# Patient Record
Sex: FEMALE | Race: WHITE | NOT HISPANIC OR LATINO | Employment: UNEMPLOYED | URBAN - METROPOLITAN AREA
[De-identification: names, ages, dates, MRNs, and addresses within clinical notes are randomized per-mention and may not be internally consistent; named-entity substitution may affect disease eponyms.]

---

## 2018-08-27 ENCOUNTER — HOSPITAL ENCOUNTER (EMERGENCY)
Facility: CLINIC | Age: 18
Discharge: HOME | End: 2018-08-28
Attending: EMERGENCY MEDICINE | Admitting: EMERGENCY MEDICINE
Payer: MEDICAID

## 2018-08-27 DIAGNOSIS — R11.2 NON-INTRACTABLE VOMITING WITH NAUSEA, UNSPECIFIED VOMITING TYPE: ICD-10-CM

## 2018-08-27 DIAGNOSIS — R53.81 MALAISE: ICD-10-CM

## 2018-08-27 DIAGNOSIS — J02.9 ACUTE PHARYNGITIS, UNSPECIFIED ETIOLOGY: ICD-10-CM

## 2018-08-27 DIAGNOSIS — R51.9 NONINTRACTABLE EPISODIC HEADACHE, UNSPECIFIED HEADACHE TYPE: ICD-10-CM

## 2018-08-27 PROCEDURE — 96375 TX/PRO/DX INJ NEW DRUG ADDON: CPT

## 2018-08-27 PROCEDURE — 96361 HYDRATE IV INFUSION ADD-ON: CPT

## 2018-08-27 PROCEDURE — 80053 COMPREHEN METABOLIC PANEL: CPT | Performed by: EMERGENCY MEDICINE

## 2018-08-27 PROCEDURE — 25000128 H RX IP 250 OP 636: Performed by: EMERGENCY MEDICINE

## 2018-08-27 PROCEDURE — 99284 EMERGENCY DEPT VISIT MOD MDM: CPT | Mod: 25

## 2018-08-27 PROCEDURE — 96374 THER/PROPH/DIAG INJ IV PUSH: CPT

## 2018-08-27 PROCEDURE — 86308 HETEROPHILE ANTIBODY SCREEN: CPT | Performed by: EMERGENCY MEDICINE

## 2018-08-27 PROCEDURE — 85025 COMPLETE CBC W/AUTO DIFF WBC: CPT | Performed by: EMERGENCY MEDICINE

## 2018-08-27 RX ORDER — DEXAMETHASONE SODIUM PHOSPHATE 10 MG/ML
10 INJECTION, SOLUTION INTRAMUSCULAR; INTRAVENOUS ONCE
Status: COMPLETED | OUTPATIENT
Start: 2018-08-27 | End: 2018-08-27

## 2018-08-27 RX ORDER — METOCLOPRAMIDE HYDROCHLORIDE 5 MG/ML
10 INJECTION INTRAMUSCULAR; INTRAVENOUS ONCE
Status: COMPLETED | OUTPATIENT
Start: 2018-08-27 | End: 2018-08-27

## 2018-08-27 RX ORDER — DIPHENHYDRAMINE HYDROCHLORIDE 50 MG/ML
25 INJECTION INTRAMUSCULAR; INTRAVENOUS ONCE
Status: COMPLETED | OUTPATIENT
Start: 2018-08-27 | End: 2018-08-27

## 2018-08-27 RX ADMIN — METOCLOPRAMIDE 10 MG: 5 INJECTION, SOLUTION INTRAMUSCULAR; INTRAVENOUS at 23:55

## 2018-08-27 RX ADMIN — DEXAMETHASONE SODIUM PHOSPHATE 10 MG: 10 INJECTION, SOLUTION INTRAMUSCULAR; INTRAVENOUS at 23:55

## 2018-08-27 RX ADMIN — DIPHENHYDRAMINE HYDROCHLORIDE 25 MG: 50 INJECTION, SOLUTION INTRAMUSCULAR; INTRAVENOUS at 23:55

## 2018-08-27 RX ADMIN — SODIUM CHLORIDE 1000 ML: 9 INJECTION, SOLUTION INTRAVENOUS at 23:55

## 2018-08-27 ASSESSMENT — ENCOUNTER SYMPTOMS
SORE THROAT: 1
FEVER: 1
HEADACHES: 1
NAUSEA: 1
DIFFICULTY URINATING: 0
VOMITING: 1
TROUBLE SWALLOWING: 1
DYSURIA: 0
SHORTNESS OF BREATH: 1
FREQUENCY: 0
APPETITE CHANGE: 1
WEAKNESS: 1
COUGH: 1
HEMATURIA: 0
MYALGIAS: 1
RHINORRHEA: 0
CHILLS: 1

## 2018-08-27 NOTE — ED AVS SNAPSHOT
Emergency Department    [address] [place] [address]    Phone:  [phone]    Fax:  [phone]                                       Sheri Fuller   MRN: [id number]    Department:   Emergency Department   Date of Visit:  8/27/2018           After Visit Summary Signature Page     I have received my discharge instructions, and my questions have been answered. I have discussed any challenges I see with this plan with the nurse or doctor.    ..........................................................................................................................................  Patient/Patient Representative Signature      ..........................................................................................................................................  Patient Representative Print Name and Relationship to Patient    ..................................................               ................................................  Date                                            Time    ..........................................................................................................................................  Reviewed by Signature/Title    ...................................................              ..............................................  Date                                                            Time          22EPIC Rev 08/18

## 2018-08-27 NOTE — ED AVS SNAPSHOT
Emergency Department    [address] [facility] [address]    Phone:  [phone]    Fax:  [phone]                                       Sheri Fuller   MRN: [id number]    Department:   Emergency Department   Date of Visit:  8/27/2018           Patient Information     Date Of Birth          2000        Your diagnoses for this visit were:     Acute pharyngitis, unspecified etiology     Malaise     Nonintractable episodic headache, unspecified headache type     Non-intractable vomiting with nausea, unspecified vomiting type        You were seen by Sony Mora MD and Elder Guzman MD.      Follow-up Information     Follow up with Vladislav Kim MD. Schedule an appointment as soon as possible for a visit in 2 days.    Specialty:  Family Practice    Why:  As needed    Contact information:    [facility]  [address]  [facility] [address] [phone]          Follow up with  Emergency Department.    Specialty:  EMERGENCY MEDICINE    Why:  If symptoms worsen    Contact information:    [address] [facility] [address] [phone]      Discharge References/Attachments     SORE THROAT, WHEN YOU HAVE A (ENGLISH)    PHARYNGITIS, REPORT PENDING (ENGLISH)    VOMITING (6Y-ADULT) (ENGLISH)    HEADACHE, UNSPECIFIED (ENGLISH)      24 Hour Appointment Hotline       To make an appointment at any [facility], call [phone] ([phone]). If you don't have a family doctor or clinic, we will help you find one. [facility] are conveniently located to serve the needs of you and your family.             Review of your medicines      START taking        Dose / Directions Last dose taken    ibuprofen 600 MG tablet   Commonly known as:  ADVIL/MOTRIN   Dose:  600 mg   Quantity:  30 tablet        Take 1 tablet (600 mg) by mouth every 6 hours as needed for moderate pain   Refills:  0        ondansetron 4 MG ODT tab   Commonly known as:  ZOFRAN ODT    Dose:  4 mg   Quantity:  10 tablet        Take 1 tablet (4 mg) by mouth every 8 hours as needed for nausea   Refills:  0                Prescriptions were sent or printed at these locations (2 Prescriptions)                   Other Prescriptions                Printed at Department/Unit printer (2 of 2)         ibuprofen (ADVIL/MOTRIN) 600 MG tablet               ondansetron (ZOFRAN ODT) 4 MG ODT tab                Procedures and tests performed during your visit     CBC with platelets + differential    Comprehensive metabolic panel    Mononucleosis screen      Orders Needing Specimen Collection     None      Pending Results     No orders found for last 3 day(s).            Pending Culture Results     No orders found for last 3 day(s).            Pending Results Instructions     If you had any lab results that were not finalized at the time of your Discharge, you can call the ED Lab Result RN at [phone]. You will be contacted by this team for any positive Lab results or changes in treatment. The nurses are available 7 days a week from 10A to 6:30P.  You can leave a message 24 hours per day and they will return your call.        Test Results From Your Hospital Stay        8/28/2018 12:32 AM      Component Results     Component Value Ref Range & Units Status    Sodium 137 133 - 144 mmol/L Final    Potassium 3.7 3.4 - 5.3 mmol/L Final    Chloride 103 96 - 110 mmol/L Final    Carbon Dioxide 24 20 - 32 mmol/L Final    Anion Gap 10 3 - 14 mmol/L Final    Glucose 85 70 - 99 mg/dL Final    Urea Nitrogen 9 7 - 30 mg/dL Final    Creatinine 0.77 0.50 - 1.00 mg/dL Final    GFR Estimate >90 >60 mL/min/1.7m2 Final    Non  GFR Calc    GFR Estimate If Black >90 >60 mL/min/1.7m2 Final    African American GFR Calc    Calcium 8.5 8.5 - 10.1 mg/dL Final    Bilirubin Total 0.3 0.2 - 1.3 mg/dL Final    Albumin 3.9 3.4 - 5.0 g/dL Final    Protein Total 8.2 6.8 - 8.8 g/dL Final    Alkaline Phosphatase 72 40 - 150 U/L  Final    ALT 17 0 - 50 U/L Final    AST 17 0 - 35 U/L Final         8/28/2018 12:14 AM      Component Results     Component Value Ref Range & Units Status    Mononucleosis Screen Negative NEG^Negative Final         8/28/2018 12:07 AM      Component Results     Component Value Ref Range & Units Status    WBC 10.2 4.0 - 11.0 10e9/L Final    RBC Count 4.11 3.8 - 5.2 10e12/L Final    Hemoglobin 12.4 11.7 - 15.7 g/dL Final    Hematocrit 36.7 35.0 - 47.0 % Final    MCV 89 78 - 100 fl Final    MCH 30.2 26.5 - 33.0 pg Final    MCHC 33.8 31.5 - 36.5 g/dL Final    RDW 12.5 10.0 - 15.0 % Final    Platelet Count 236 150 - 450 10e9/L Final    Diff Method Automated Method  Final    % Neutrophils 75.3 % Final    % Lymphocytes 14.1 % Final    % Monocytes 9.3 % Final    % Eosinophils 0.9 % Final    % Basophils 0.2 % Final    % Immature Granulocytes 0.2 % Final    Nucleated RBCs 0 0 /100 Final    Absolute Neutrophil 7.7 1.6 - 8.3 10e9/L Final    Absolute Lymphocytes 1.4 0.8 - 5.3 10e9/L Final    Absolute Monocytes 1.0 0.0 - 1.3 10e9/L Final    Absolute Eosinophils 0.1 0.0 - 0.7 10e9/L Final    Absolute Basophils 0.0 0.0 - 0.2 10e9/L Final    Abs Immature Granulocytes 0.0 0 - 0.4 10e9/L Final    Absolute Nucleated RBC 0.0  Final                Clinical Quality Measure: Blood Pressure Screening     Your blood pressure was checked while you were in the emergency department today. The last reading we obtained was  BP: 115/68 . Please read the guidelines below about what these numbers mean and what you should do about them.  If your systolic blood pressure (the top number) is less than 120 and your diastolic blood pressure (the bottom number) is less than 80, then your blood pressure is normal. There is nothing more that you need to do about it.  If your systolic blood pressure (the top number) is 120-139 or your diastolic blood pressure (the bottom number) is 80-89, your blood pressure may be higher than it should be. You should have  "your blood pressure rechecked within a year by a primary care provider.  If your systolic blood pressure (the top number) is 140 or greater or your diastolic blood pressure (the bottom number) is 90 or greater, you may have high blood pressure. High blood pressure is treatable, but if left untreated over time it can put you at risk for heart attack, stroke, or kidney failure. You should have your blood pressure rechecked by a primary care provider within the next 4 weeks.  If your provider in the emergency department today gave you specific instructions to follow-up with your doctor or provider even sooner than that, you should follow that instruction and not wait for up to 4 weeks for your follow-up visit.        Thank you for choosing [facility]       Thank you for choosing [facility] for your care. Our goal is always to provide you with excellent care. Hearing back from our patients is one way we can continue to improve our services. Please take a few minutes to complete the written survey that you may receive in the mail after you visit with us. Thank you!        ANTERIOS Information     [facility]IOS lets you send messages to your doctor, view your test results, renew your prescriptions, schedule appointments and more. To sign up, go to [email or web address] . Click on \"Log in\" on the left side of the screen, which will take you to the Welcome page. Then click on \"Sign up Now\" on the right side of the page.     You will be asked to enter the access code listed below, as well as some personal information. Please follow the directions to create your username and password.     Your access code is: [email or web address]  Expires: 2018  1:50 AM     Your access code will  in 90 days. If you need help or a new code, please call your [facility] clinic or [phone].        Care EveryWhere ID     This is your Care EveryWhere ID. This could be used by other organizations to access your [facility] medical records  [id number]   "      Equal Access to Services     RADHA LUND : Yaron [facility], heather grossman, chirag dahl. So [facility] [phone].    ATENCIÓN: Si habla español, tiene a lu disposición servicios gratuitos de asistencia lingüística. Llame al [phone].    We comply with applicable federal civil rights laws and [place] laws. We do not discriminate on the basis of race, color, national origin, age, disability, sex, sexual orientation, or gender identity.            After Visit Summary       This is your record. Keep this with you and show to your community pharmacist(s) and doctor(s) at your next visit.

## 2018-08-28 VITALS
BODY MASS INDEX: 20.89 KG/M2 | RESPIRATION RATE: 18 BRPM | HEIGHT: 66 IN | OXYGEN SATURATION: 99 % | TEMPERATURE: 98.1 F | WEIGHT: 130 LBS | DIASTOLIC BLOOD PRESSURE: 75 MMHG | SYSTOLIC BLOOD PRESSURE: 114 MMHG

## 2018-08-28 LAB
ALBUMIN SERPL-MCNC: 3.9 G/DL (ref 3.4–5)
ALP SERPL-CCNC: 72 U/L (ref 40–150)
ALT SERPL W P-5'-P-CCNC: 17 U/L (ref 0–50)
ANION GAP SERPL CALCULATED.3IONS-SCNC: 10 MMOL/L (ref 3–14)
AST SERPL W P-5'-P-CCNC: 17 U/L (ref 0–35)
BASOPHILS # BLD AUTO: 0 10E9/L (ref 0–0.2)
BASOPHILS NFR BLD AUTO: 0.2 %
BILIRUB SERPL-MCNC: 0.3 MG/DL (ref 0.2–1.3)
BUN SERPL-MCNC: 9 MG/DL (ref 7–30)
CALCIUM SERPL-MCNC: 8.5 MG/DL (ref 8.5–10.1)
CHLORIDE SERPL-SCNC: 103 MMOL/L (ref 96–110)
CO2 SERPL-SCNC: 24 MMOL/L (ref 20–32)
CREAT SERPL-MCNC: 0.77 MG/DL (ref 0.5–1)
DIFFERENTIAL METHOD BLD: NORMAL
EOSINOPHIL # BLD AUTO: 0.1 10E9/L (ref 0–0.7)
EOSINOPHIL NFR BLD AUTO: 0.9 %
ERYTHROCYTE [DISTWIDTH] IN BLOOD BY AUTOMATED COUNT: 12.5 % (ref 10–15)
GFR SERPL CREATININE-BSD FRML MDRD: >90 ML/MIN/1.7M2
GLUCOSE SERPL-MCNC: 85 MG/DL (ref 70–99)
HCT VFR BLD AUTO: 36.7 % (ref 35–47)
HETEROPH AB SER QL: NEGATIVE
HGB BLD-MCNC: 12.4 G/DL (ref 11.7–15.7)
IMM GRANULOCYTES # BLD: 0 10E9/L (ref 0–0.4)
IMM GRANULOCYTES NFR BLD: 0.2 %
LYMPHOCYTES # BLD AUTO: 1.4 10E9/L (ref 0.8–5.3)
LYMPHOCYTES NFR BLD AUTO: 14.1 %
MCH RBC QN AUTO: 30.2 PG (ref 26.5–33)
MCHC RBC AUTO-ENTMCNC: 33.8 G/DL (ref 31.5–36.5)
MCV RBC AUTO: 89 FL (ref 78–100)
MONOCYTES # BLD AUTO: 1 10E9/L (ref 0–1.3)
MONOCYTES NFR BLD AUTO: 9.3 %
NEUTROPHILS # BLD AUTO: 7.7 10E9/L (ref 1.6–8.3)
NEUTROPHILS NFR BLD AUTO: 75.3 %
NRBC # BLD AUTO: 0 10*3/UL
NRBC BLD AUTO-RTO: 0 /100
PLATELET # BLD AUTO: 236 10E9/L (ref 150–450)
POTASSIUM SERPL-SCNC: 3.7 MMOL/L (ref 3.4–5.3)
PROT SERPL-MCNC: 8.2 G/DL (ref 6.8–8.8)
RBC # BLD AUTO: 4.11 10E12/L (ref 3.8–5.2)
SODIUM SERPL-SCNC: 137 MMOL/L (ref 133–144)
WBC # BLD AUTO: 10.2 10E9/L (ref 4–11)

## 2018-08-28 PROCEDURE — 25000128 H RX IP 250 OP 636: Performed by: EMERGENCY MEDICINE

## 2018-08-28 RX ORDER — KETOROLAC TROMETHAMINE 15 MG/ML
15 INJECTION, SOLUTION INTRAMUSCULAR; INTRAVENOUS ONCE
Status: COMPLETED | OUTPATIENT
Start: 2018-08-28 | End: 2018-08-28

## 2018-08-28 RX ORDER — IBUPROFEN 600 MG/1
600 TABLET, FILM COATED ORAL EVERY 6 HOURS PRN
Qty: 30 TABLET | Refills: 0 | Status: SHIPPED | OUTPATIENT
Start: 2018-08-28 | End: 2018-12-26

## 2018-08-28 RX ORDER — ONDANSETRON 4 MG/1
4 TABLET, ORALLY DISINTEGRATING ORAL EVERY 8 HOURS PRN
Qty: 10 TABLET | Refills: 0 | Status: SHIPPED | OUTPATIENT
Start: 2018-08-28 | End: 2019-02-03

## 2018-08-28 RX ADMIN — KETOROLAC TROMETHAMINE 15 MG: 15 INJECTION, SOLUTION INTRAMUSCULAR; INTRAVENOUS at 00:55

## 2018-08-28 NOTE — ED PROVIDER NOTES
"  History     Chief Complaint:  Cough    HPI   Sheri Fuller is an otherwise healthy 17 year old female who presents with cough. Friend reports patient has been experiencing non-productive cough, sore throat, shortness of breath, difficulty swallowing secondary to throat pain, decreased appetite, generalized weakness, myalgias, headache, nausea, and vomiting for the last 3 days. Symptoms became worse today and she was evaluated at [facility] in [place] earlier today and had a rapid strep which was stated to be \"inconclusive.\" She reports a culture is pending. Patient then presents to the emergency department for further evaluation and treatment. Upon evaluation, patient endorses subjective fever and chills. She has taken Tylenol, ibuprofen and Excedrin for her symptoms without much relief. Last dose of ibuprofen was about 2-3 hours ago. Family member also notes that patient had a panic attack earlier today. Patient also reports having an abnormality or \"bump\" on her right nipple for 3 months with increase in pain, redness, and swelling. Denies rhinorrhea, urinary symptoms, abnormal vaginal bleeding or discharge. No tobacco, alcohol, or illicit drug use.     Allergies:  No known drug allergies.    Medications:    The patient is currently on no regular medications.    Past Medical History:    History reviewed.  No significant past medical history.     Past Surgical History:    History reviewed. No pertinent past surgical history.    Family History:    History reviewed. No pertinent family history.    Social History:  Tobacco Use: No  Alcohol Use: No  Accompanied to the ED by friend     Review of Systems   Constitutional: Positive for appetite change, chills and fever.   HENT: Positive for sore throat and trouble swallowing. Negative for rhinorrhea.    Respiratory: Positive for cough and shortness of breath.    Gastrointestinal: Positive for nausea and vomiting.   Genitourinary: Negative for difficulty " "urinating, dysuria, frequency, hematuria, vaginal bleeding and vaginal discharge.   Musculoskeletal: Positive for myalgias.   Neurological: Positive for weakness (generalized) and headaches.   All other systems reviewed and are negative.    Physical Exam   Patient Vitals for the past 24 hrs:   BP Temp Temp src Heart Rate Resp SpO2 Height Weight   08/28/18 0159 114/75 - - 89 18 99 % - -   08/27/18 2312 115/68 98.1  F (36.7  C) Temporal 88 16 100 % 1.676 m (5' 6\") 59 kg (130 lb)      Physical Exam     Constitutional:  Appears well-developed and well-nourished. Cooperative. Appears uncomfortable.  HENT:   Head:    Atraumatic.   Ears:   Cerumen in both ears. Visualized portions of TMs look unremarkable.   Mouth/Throat:   Tacky oral mucosa. Erythema and Mild symmetric swelling to posterior oropharynx. No exudate. Anterior lymphadenopathy bilaterally.   Eyes:    Conjunctivae normal and EOM are normal.      Pupils are equal, round, and reactive to light.   Neck:    Normal range of motion. Neck supple.   Cardiovascular:  Normal rate, regular rhythm, normal heart sounds and radial and    dorsalis pedis pulses are 2+ and symmetric.    Pulmonary/Chest:  Effort normal and breath sounds normal.   Abdominal:   Soft. Bowel sounds are normal.      No splenomegaly or hepatomegaly. No tenderness. No rebound.   Breast:   5mm indurated lump on right areola at approximately 9 oclock. No surrounding erythema or fluctuance.  Musculoskeletal:  Normal range of motion. No edema and no tenderness.   Neurological:  Alert. Normal strength. No cranial nerve deficit.   Skin:    Skin is warm and dry.   Psychiatric:   Normal mood and affect.     Emergency Department Course   Laboratory:  Laboratory findings were communicated with the patient who voiced understanding of the findings.   CBC: WNL (WBC 10.2, HGB 12.4, )  CMP: (Creatinine 0.77)   Mononucleosis: Negative.    Interventions:  2355: Reglan 10 mg IV  2355: NS 1L IV Bolus   2355: " Decadron 10 mg IV  2355: Benadryl 25 mg IV    Emergency Department Course:  Past medical records, nursing notes, and vitals reviewed.  2330: I performed an exam of the patient and obtained history, as documented above.  IV inserted and blood drawn for basic laboratory. Results as noted above.    2355: Patient was given the above interventions while here in the emergency department.   0152: I rechecked the patient and explained the findings to the patient.  Patient discharged home with instructions regarding supportive care, medications, and reasons to return. The importance of close follow-up was reviewed.      Impression & Plan    Medical Decision Making:  Patient presents complaining of sore throat, general malaise, migraine headache, and a bump that's been on her nipple for the last few months. She's had a rapid strep test done at outside clinic which was negative. Culture is pending, so I did not repeat this. She reports she's been vomiting and feels weak and dehydrated. CBC with differential, comprehensive metabolic panel look unremarkable. Abdominal exam is benign. I checked a mono and it's negative. I discussed with the patient that this is still in the differential because she's only had symptoms for 3 days and repeat testing may be indicated for persistent symptoms. Currently there is not tenderness over her spleen or palpable abdominal mass.    Regarding the patient's headache. She has a history of recurrent migraines. She said this is similar to her usual migraines, only worse than normal. After IV fluids, Reglan, benadryl, and Toradol and a dose of IV decadron aimed at her throat inflammation, patient's headache has completely resolved. Her throat pain has greatly improved.    I don't see signs of peritonsillar abscess. There's no Eliseo's angina, retropharyngeal abscess, epiglottitis, or signs of Lemierre's, the patient is non-toxic appearing. I do not detect any serious complications from her likely  viral pharyngitis.     Patient has a small indurated lump/potts's tubercle, on her right areola. This does not appear to be infected. There's no fluctuance, nothing amendable to drainage. I've asked the patient to try hot compresses, and she can see her primary care provider if it does not normalize. This does not appear to be a breast mass and it is not concerning for neoplasm.     The patient felt improved with the abovementioned interventions. She feels ready to go home. I prescribed Reglan for headache and nausea at home. She'll continue taking Tylenol and/or ibuprofen for her other symptoms. Patient's discharged in good condition.     Diagnosis:    ICD-10-CM   1. Acute pharyngitis, unspecified etiology J02.9   2. Malaise R53.81   3. Nonintractable episodic headache, unspecified headache type R51   4. Non-intractable vomiting with nausea, unspecified vomiting type R11.2     Disposition:  discharged to home    Discharge Medications:   Details   ibuprofen (ADVIL/MOTRIN) 600 MG tablet Take 1 tablet (600 mg) by mouth every 6 hours as needed for moderate pain, Disp-30 tablet, R-0, Local Print      ondansetron (ZOFRAN ODT) 4 MG ODT tab Take 1 tablet (4 mg) by mouth every 8 hours as needed for nausea, Disp-10 tablet, R-0, Local Print     Shirley Elaine  8/27/18   EMERGENCY DEPARTMENT  IShirley Do, am serving as a scribe at 11:30 PM on 8/27/2018 to document services personally performed by Elder Guzman MD based on my observations and the provider's statements to me.       Elder Guzman MD  08/28/18 0639

## 2018-09-12 ENCOUNTER — OFFICE VISIT (OUTPATIENT)
Dept: FAMILY MEDICINE | Facility: CLINIC | Age: 18
End: 2018-09-12

## 2018-09-12 VITALS
WEIGHT: 131.2 LBS | DIASTOLIC BLOOD PRESSURE: 70 MMHG | HEART RATE: 100 BPM | SYSTOLIC BLOOD PRESSURE: 102 MMHG | TEMPERATURE: 98.2 F | BODY MASS INDEX: 21.18 KG/M2 | OXYGEN SATURATION: 98 %

## 2018-09-12 DIAGNOSIS — N92.6 IRREGULAR MENSTRUAL CYCLE: Chronic | ICD-10-CM

## 2018-09-12 LAB
HBA1C MFR BLD: 5.4 % (ref 0–5.6)
PROLACTIN SERPL-MCNC: 9 UG/L (ref 3–27)
T4 FREE SERPL-MCNC: 0.88 NG/DL (ref 0.76–1.46)
TSH SERPL DL<=0.005 MIU/L-ACNC: 1.16 MU/L (ref 0.4–4)

## 2018-09-12 RX ORDER — SPIRONOLACTONE 25 MG/1
50 TABLET ORAL DAILY
Qty: 30 TABLET | Refills: 3 | Status: SHIPPED | OUTPATIENT
Start: 2018-09-12 | End: 2018-10-18

## 2018-09-12 NOTE — PROGRESS NOTES
[facility]       Sheri Fuller is a 17 year old  who presents for   Chief Complaint   Patient presents with     Vaginal Bleeding     menstural cycle that comes every 14-21 days and lasts for 7 and bleeds severly for 3 days and then moderate for 4     Vaginal Bleeding /Dysmenorrhea  Onset: 5 years ago    Description:   Duration of bleeding episodes: +/- 7 days  Frequency between periods:  1 week  Describe bleeding/flow:   Clots: Yes Details: occasionally  Number of pads/hour: 2  Cramping: severe: 1st 2 days.    Accompanying Signs & Symptoms:  Weakness: Yes Details: un-motivated and ill.  Lightheadedness: no  Hot flashes: no  Nosebleeds/Easy bruising: no  Vaginal Discharge: no    History:  Patient's last menstrual period was 09/05/2018.  Previous normal periods: Yes Details: 1st year of menses: age 13-14  Contraceptive use: NONE: has assumed that her partner, Alana, a MTF trans person, is infertile despite her ability to achieve erections and ejaculations while on estrogen and spironolactone.   Possibility of Pregnancy: YES- remote: currently on day 7 of cycle.  Any bleeding after intercourse: no  Age of first period (menarche): 11  Abnormal PAP Smears: no  Any previous pelvic imaging? no    What makes it worse?:  Stress.    What makes it better?: heating pads, midol  Treatments and Outcome? Nothing has changed the bleeding pattern: she has tried 2 different OCPs and they both failed to regulate her menses.    Problem, Medication and Allergy Lists were reviewed and updated if needed.  Patient is a new patient to this clinic and so  I reviewed/updated the Past Medical History, the Family History and the Social History .         Review of Systems:   Review of Systems   Constitutional: Negative for activity change, appetite change, chills, diaphoresis, fever and unexpected weight change.   HENT: Negative for ear pain and sore throat.    Eyes: Negative for pain.   Respiratory: Negative for cough and shortness of breath.     Cardiovascular: Negative for chest pain and palpitations.   Gastrointestinal: Negative for abdominal pain, constipation, diarrhea, rectal pain and vomiting.   Endocrine: Negative.    Genitourinary: Positive for menstrual problem, vaginal bleeding (menometorrhagia.) and vaginal pain (cramps). Negative for difficulty urinating, dyspareunia, frequency, genital sores, hematuria and vaginal discharge.   Musculoskeletal: Positive for back pain. Negative for gait problem.   Skin: Positive for rash (severe cystic acne over entire face.).        Increased growth of body hair on thighs, groin, lower abdomen, lower back.   Allergic/Immunologic: Negative.    Neurological: Negative for seizures, syncope and headaches.   Hematological: Negative.  Does not bruise/bleed easily.   Psychiatric/Behavioral: Positive for dysphoric mood and sleep disturbance. Negative for self-injury. The patient is not nervous/anxious.    All other systems reviewed and are negative.         Physical Exam:     Vitals:    09/12/18 1340   BP: 102/70   Pulse: 100   Temp: 98.2  F (36.8  C)   TempSrc: Oral   SpO2: 98%   Weight: 131 lb 3.2 oz (59.5 kg)     Body mass index is 21.18 kg/(m^2).  Vitals were reviewed and were normal.     Physical Exam   Constitutional: She is oriented to person, place, and time. She appears well-developed and well-nourished. No distress.   HENT:   Head: Normocephalic and atraumatic.   Eyes: EOM are normal. Pupils are equal, round, and reactive to light.   Neck: Neck supple.   Cardiovascular: Normal rate, regular rhythm, normal heart sounds and intact distal pulses.    Pulmonary/Chest: Effort normal and breath sounds normal. No respiratory distress. She has no wheezes.   Abdominal: Soft. Bowel sounds are normal. She exhibits no distension. There is no tenderness.   Musculoskeletal: Normal range of motion. She exhibits no edema, tenderness or deformity.   Neurological: She is alert and oriented to person, place, and time. She  exhibits normal muscle tone.   Skin: Skin is warm and dry. She is not diaphoretic.   Psychiatric: She has a normal mood and affect. Thought content normal.   Vitals reviewed.      Results:    Results from this visit  Results for orders placed or performed in visit on 09/12/18   Prolactin   Result Value Ref Range    Prolactin 9 3 - 27 ug/L   Testosterone total   Result Value Ref Range    Testosterone Total 35 8 - 60 ng/dL   TSH   Result Value Ref Range    TSH 1.16 0.40 - 4.00 mU/L   T4 FREE   Result Value Ref Range    T4 Free 0.88 0.76 - 1.46 ng/dL   Hemoglobin A1c   Result Value Ref Range    Hemoglobin A1C 5.4 0 - 5.6 %     Assessment and Plan      Sheri was seen today for abnormal vaginal bleeding.  She reports that both menorrhagia and metorrhagia have been issues for her fairly consistently over the past 5+ years.  She reports that when her menses started (age 11) they were very light, normal, and consistent for the first year.  Since then her menstrual cycle has been a struggle.  She has tried both lo-Ortho Tri-Cyclen and another OCP without having either 1 control her menstrual cycle in the past.    She has a strong family history of menstrual system troubles: Her grandmother had fibroids that caused bleeding severe enough to warrant hysterectomy.  Her mother has been diagnosed with fibroids and endometriosis.  Her older sister (who is also not overweight) has struggled with PCOS her whole life to the degree that she underwent several cycles of IVF before being able to conceive.  In addition, her older sister has no known history of diabetes or insulin resistance that is of a testable/pathological level.    In addition, Sheri reports increased (male pattern) body hair that is becoming progressively thicker on her thighs, pubic region, lower abdomen, and lower back.  She also states that she has had severe cystic acne that covers cheeks, forehead, chin and also affects chest and upper back.  She has treated  this with many different over-the-counter products that have not made a significant impact.    While it is important to rule out other common issues such as thyroid abnormalities, and more uncommon issues such as adrenal gland irregularities, based on her symptoms and her family history (and despite her slender body habitus) this is very likely to be PCOS, fibroid, or endometriosis.    I was also concerned to note that Sheri and her partner (Alana MTF) reported that they were sexually active without protection, and that they were both under the impression that since Alana is on spironolactone and estrogen that she is infertile, while she is still able to achieve erection and ejaculation.  I explained that it would be very important to get testing to prove that, and that infertility cannot be assumed based on the breakthrough testosterone supporting penetrative acts/orgasm.    Both Lori's irregular menses and current sexual activity with a person with sperm potentially can be treated with a hormonal birth control: Despite past inability to control cycle, previous hormone administration had possible adherence issues as well as possibly not having had a long enough trial to work.  After a long conversation about the different options that are available to both prevent pregnancy and hopefully regulate her cycle, they determined that a Mirena IUD would be the least intrusive.  In addition, after discussing potential risks and benefits of medication, she desires a trial of spironolactone to gauge its effect on her body hair and cystic acne.    Diagnoses and all orders for this visit:    Irregular menstrual cycle  -     spironolactone (ALDACTONE) 25 MG tablet; Take 2 tablets (50 mg) by mouth daily  -     US Transvaginal Non OB; Future  -     Prolactin  -     Testosterone total  -     TSH  -     T4 FREE  -     Hemoglobin A1c  -     Colposcopy/Gynecology Clinic-[facility] INTERNAL REFERRAL; Future: Mirena Implant  planned.    We plan to follow-up 1 month after the Mirena placement to discuss how her cycle has responded (potential to supplement with OCPs for heavier than normal breakthrough bleeding).     There are no discontinued medications.    Options for treatment and follow-up care were reviewed with the patient. Sheri Fuller engaged in the decision making process and verbalized understanding of the options discussed and agreed with the final plan.    DO [facility]  [phone]  [facility]

## 2018-09-12 NOTE — PATIENT INSTRUCTIONS
Here is the plan from today's visit    1. Irregular menstrual cycle  - spironolactone (ALDACTONE) 25 MG tablet; Take 2 tablets (50 mg) by mouth daily  Dispense: 30 tablet; Refill: 3  - US Transvaginal Non OB; Future  - Prolactin  - Testosterone total  - TSH  - T4 FREE  - Hemoglobin A1c  - Colposcopy/Gynecology Clinic-[facility] INTERNAL REFERRAL; Future: Mirena Implant.      Please call or return to clinic if your symptoms don't go away.    Follow up plan  Please make a clinic appointment for follow up with me (ABBY HANSEN) in 3 weeks for follow up.    [facility]  Jazmine Hansen,    [facility]  [phone]  [facility] [place] [facility] [place]    Lab Testing:  **If you had lab testing today and your results are reassuring or normal they will be mailed to you or sent through [employer] within 7 days.   **If the lab tests need quick action we will call you with the results.  The phone number we will call with results is # [phone] (home) . If this is not the best number please call our clinic and change the number.  Medication Refills:  If you need any refills please call your pharmacy and they will contact us.   If you need to  your refill at a new pharmacy, please contact the new pharmacy directly. The new pharmacy will help you get your medications transferred faster.   Scheduling:  If you have any concerns about today's visit or wish to schedule another appointment please call our office during normal business hours [phone] (8-5:00 M-F)  If a referral was made to a [facility] and you don't get a call from central scheduling please call [phone].  If a Mammogram was ordered for you at [facility] call [phone] to schedule or change your appointment.  If you had an XRay/CT/Ultrasound/MRI ordered the number is [phone] to schedule or change your radiology appointment.   Medical Concerns:  If you have  urgent medical concerns please call [phone] at any time of the day.    Natasha Hansen DO

## 2018-09-12 NOTE — MR AVS SNAPSHOT
After Visit Summary   9/12/2018    Sheri Fuller    MRN: [id number]           Patient Information     Date Of Birth          2000        Visit Information        Provider Department      9/12/2018 1:40 PM Abby Hansen DO [facility]        Today's Diagnoses     Irregular menstrual cycle          Care Instructions    Here is the plan from today's visit    1. Irregular menstrual cycle  - spironolactone (ALDACTONE) 25 MG tablet; Take 2 tablets (50 mg) by mouth daily  Dispense: 30 tablet; Refill: 3  - US Transvaginal Non OB; Future  - Prolactin  - Testosterone total  - TSH  - T4 FREE  - Hemoglobin A1c  - Colposcopy/Gynecology Clinic-[facility] INTERNAL REFERRAL; Future: Mirena Implant.      Please call or return to clinic if your symptoms don't go away.    Follow up plan  Please make a clinic appointment for follow up with me (ABBY HANSEN) in 3 weeks for follow up.    Thank you for coming to [facility] today!  Jazmine Hansen DO   [facility]  [phone]  [facility]    Lab Testing:  **If you had lab testing today and your results are reassuring or normal they will be mailed to you or sent through [employer] within 7 days.   **If the lab tests need quick action we will call you with the results.  The phone number we will call with results is # [phone] (home) . If this is not the best number please call our clinic and change the number.  Medication Refills:  If you need any refills please call your pharmacy and they will contact us.   If you need to  your refill at a new pharmacy, please contact the new pharmacy directly. The new pharmacy will help you get your medications transferred faster.   Scheduling:  If you have any concerns about today's visit or wish to schedule another appointment please call our office during normal business hours [phone] (8-5:00 M-F)  If a referral was made to a [facility] and you don't get a call from central scheduling please call [phone].  If a Mammogram was ordered for you at [facility] call [phone] to schedule or change your appointment.  If you had an XRay/CT/Ultrasound/MRI ordered the number is [phone] to schedule or change your radiology appointment.   Medical Concerns:  If you have urgent medical concerns please call [phone] at any time of the day.    Natasha Hansen, DO            Follow-ups after your visit        Additional Services     [facility]copy[facility]Gynecology [facility] REFERR[facility]       What procedure: Mirena placement.  Urgency of Appointment: Next Available  Would this patient benefit from pre-medication with Ativan for procedural anxiety? Yes ([place] Provider will Order, Patient instructed to Arrive 1 hour early)  Is this patient post-menopausal? No                  Future tests that were ordered for you today     Open Future Orders        Priority Expected Expires Ordered    US Transvaginal Non OB Routine 9/26/2018 9/12/2019 9/12/2018    Colposcopy[facility]Gynecology Clinic-[facility] INTERNAL REFERRAL Routine 9/12/2018 10/1/2018 9/12/2018            Who to contact     Please call your clinic at [phone] to:    Ask questions about your health    Make or cancel appointments    Discuss your medicines    Learn about your test results    Speak to your doctor            Additional Information About Your Visit        MyChart Information     [employer] is an electronic gateway that provides easy, online access to your medical records. With [employer], you can request a clinic appointment, read your test results, renew a prescription or communicate with your care team.     To sign up for [employer] visit the website at [email or web address]   You will be asked to enter the access code listed below, as well as some personal information. Please follow the directions to create your username and password.     Your access code is:  [email or web address]  Expires: 2018  1:50 AM     Your access code will  in 90 days. If you need help or a new code, please contact your [facility] or call [phone] for assistance.        Care EveryWhere ID     This is your Care EveryWhere ID. This could be used by other organizations to access your [place] medical records  [id number]        Your Vitals Were     Pulse Temperature Last Period Pulse Oximetry BMI (Body Mass Index)       100 98.2  F (36.8  C) (Oral) 2018 98% 21.18 kg/m2        Blood Pressure from Last 3 Encounters:   18 102/70   18 114/75    Weight from Last 3 Encounters:   18 131 lb 3.2 oz (59.5 kg) (56 %)*   18 130 lb (59 kg) (54 %)*     * Growth percentiles are based on [facility] 2-20 Years data.              We Performed the Following     Hemoglobin A1c     Prolactin     T4 FREE     Testosterone total     TSH          Today's Medication Changes          These changes are accurate as of 18  3:18 PM.  If you have any questions, ask your nurse or doctor.               Start taking these medicines.        Dose/Directions    spironolactone 25 MG tablet   Commonly known as:  ALDACTONE   Used for:  Irregular menstrual cycle   Started by:  Natasha Hansen DO        Dose:  50 mg   Take 2 tablets (50 mg) by mouth daily   Quantity:  30 tablet   Refills:  3            Where to get your medicines      These medications were sent to [facility] [address] [place] [address] & [address], [place] [address]     Phone:  [phone]     spironolactone 25 MG tablet                Primary Care Provider Office Phone # Fax #    Natasha Hansen [phone]       [address] [address]        Equal Access to Services     RADHA LUND AH: Yaron Haywood, heather grossman, chirag dahl  ladonald domingo. So [facility] [phone].    ATENCIÓN: Si habla catarina, tiene a lu disposición servicios gratuitos de asistencia lingüística. Rosana al [phone].    We comply with applicable federal civil rights laws and [place] laws. We do not discriminate on the basis of race, color, national origin, age, disability, sex, sexual orientation, or gender identity.            Thank you!     Thank you for choosing [facility]  for your care. Our goal is always to provide you with excellent care. Hearing back from our patients is one way we can continue to improve our services. Please take a few minutes to complete the written survey that you may receive in the mail after your visit with us. Thank you!             Your Updated Medication List - Protect others around you: Learn how to safely use, store and throw away your medicines at www.disposemymeds.org.          This list is accurate as of 9/12/18  3:18 PM.  Always use your most recent med list.                   Brand Name Dispense Instructions for use Diagnosis    ibuprofen 600 MG tablet    ADVIL/MOTRIN    30 tablet    Take 1 tablet (600 mg) by mouth every 6 hours as needed for moderate pain        spironolactone 25 MG tablet    ALDACTONE    30 tablet    Take 2 tablets (50 mg) by mouth daily    Irregular menstrual cycle

## 2018-09-12 NOTE — PROGRESS NOTES
Preceptor Attestation:   Patient seen, evaluated and discussed with the resident. I have verified the content of the note, which accurately reflects my assessment of the patient and the plan of care.   Supervising Physician:  Cherelle Seymour MD

## 2018-09-12 NOTE — LETTER
September 22, 2018      Sheri Fuller  [address]  [place] [address]        Dear Sheri,          Thank you for getting your care at [facility]. Please see below for your test results: These results are all normal, which rules out several of the other conditions that can cause your menstrual issues. This is good news: as things like Thyroid Problems are ruled out, it makes other things more likely and cuts through the confusion.         I am very interested to know how things are going with the Spironolactone-- it would be great if you could come in for an appointment so that we could discuss it!    Resulted Orders   Prolactin   Result Value Ref Range    Prolactin 9 3 - 27 ug/L      Comment:      Reference ranges apply to non-pregnant females only.   Testosterone total   Result Value Ref Range    Testosterone Total 35 8 - 60 ng/dL      Comment:      This test was developed and its performance characteristics determined by the   [facility],  Special [facility] Laboratory. It has   not been cleared or approved by the FDA. The laboratory is regulated under   CLIA as qualified to perform high-complexity testing. This test is used for   clinical purposes. It should not be regarded as investigational or for   research.     TSH   Result Value Ref Range    TSH 1.16 0.40 - 4.00 mU/L   T4 FREE   Result Value Ref Range    T4 Free 0.88 0.76 - 1.46 ng/dL   Hemoglobin A1c   Result Value Ref Range    Hemoglobin A1C 5.4 0 - 5.6 %      Comment:      Normal <5.7% Prediabetes 5.7-6.4%  Diabetes 6.5% or higher - adopted from ADA   consensus guidelines.       Please call the clinic for a clinic appointment to further reveiw these results.    It was a pleasure to meet you, and I look forward to seeing you again soon!  Sincerely,    Jazmine Hansen, DO

## 2018-09-13 ENCOUNTER — TELEPHONE (OUTPATIENT)
Dept: FAMILY MEDICINE | Facility: CLINIC | Age: 18
End: 2018-09-13

## 2018-09-13 ENCOUNTER — APPOINTMENT (OUTPATIENT)
Dept: CT IMAGING | Facility: CLINIC | Age: 18
End: 2018-09-13
Attending: EMERGENCY MEDICINE
Payer: MEDICAID

## 2018-09-13 ENCOUNTER — HOSPITAL ENCOUNTER (EMERGENCY)
Facility: CLINIC | Age: 18
Discharge: HOME | End: 2018-09-13
Attending: EMERGENCY MEDICINE | Admitting: EMERGENCY MEDICINE
Payer: MEDICAID

## 2018-09-13 VITALS
OXYGEN SATURATION: 94 % | WEIGHT: 131.8 LBS | RESPIRATION RATE: 18 BRPM | SYSTOLIC BLOOD PRESSURE: 110 MMHG | DIASTOLIC BLOOD PRESSURE: 71 MMHG | BODY MASS INDEX: 21.27 KG/M2 | TEMPERATURE: 98.2 F

## 2018-09-13 DIAGNOSIS — F41.1 PRE-OPERATIVE ANXIETY: Primary | ICD-10-CM
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
## 2018-09-13 DIAGNOSIS — S10.0XXA CONTUSION OF THROAT, INITIAL ENCOUNTER: ICD-10-CM

## 2018-09-13 PROCEDURE — 25000125 ZZHC RX 250: Performed by: EMERGENCY MEDICINE

## 2018-09-13 PROCEDURE — 70491 CT SOFT TISSUE NECK W/DYE: CPT

## 2018-09-13 PROCEDURE — 25000128 H RX IP 250 OP 636: Performed by: EMERGENCY MEDICINE

## 2018-09-13 PROCEDURE — 99285 EMERGENCY DEPT VISIT HI MDM: CPT | Mod: 25

## 2018-09-13 RX ORDER — LORAZEPAM 1 MG/1
1 TABLET ORAL
Qty: 1 TABLET | Refills: 0 | Status: SHIPPED | OUTPATIENT
Start: 2018-09-13 | End: 2018-10-18

## 2018-09-13 RX ORDER — IOPAMIDOL 755 MG/ML
90 INJECTION, SOLUTION INTRAVASCULAR ONCE
Status: COMPLETED | OUTPATIENT
Start: 2018-09-13 | End: 2018-09-13

## 2018-09-13 RX ADMIN — IOPAMIDOL 90 ML: 755 INJECTION, SOLUTION INTRAVENOUS at 21:45

## 2018-09-13 RX ADMIN — SODIUM CHLORIDE, PRESERVATIVE FREE 80 ML: 5 INJECTION INTRAVENOUS at 21:45

## 2018-09-13 ASSESSMENT — ENCOUNTER SYMPTOMS
ABDOMINAL PAIN: 0
VOICE CHANGE: 1
NECK PAIN: 1
TROUBLE SWALLOWING: 0

## 2018-09-13 NOTE — ED AVS SNAPSHOT
Emergency Department    [address] [place] [address]    Phone:  [phone]    Fax:  [phone]                                       Sheri Fuller   MRN: [id number]    Department:   Emergency Department   Date of Visit:  9/13/2018           After Visit Summary Signature Page     I have received my discharge instructions, and my questions have been answered. I have discussed any challenges I see with this plan with the nurse or doctor.    ..........................................................................................................................................  Patient/Patient Representative Signature      ..........................................................................................................................................  Patient Representative Print Name and Relationship to Patient    ..................................................               ................................................  Date                                   Time    ..........................................................................................................................................  Reviewed by Signature/Title    ...................................................              ..............................................  Date                                               Time          22EPIC Rev 08/18
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
## 2018-09-13 NOTE — TELEPHONE ENCOUNTER
Called and left voicemail  Ativan prescribed and brought to [facility].   She should  ativan at the clinic on the day of her procedure but not take it until after she meets with the doctor doing her procedure and signs a consent form with them.   MD Myla

## 2018-09-14 LAB — TESTOST SERPL-MCNC: 35 NG/DL (ref 8–60)
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
## 2018-09-20 ASSESSMENT — ENCOUNTER SYMPTOMS
COUGH: 0
ENDOCRINE NEGATIVE: 1
DIAPHORESIS: 0
ABDOMINAL PAIN: 0
NERVOUS/ANXIOUS: 0
ACTIVITY CHANGE: 0
CHILLS: 0
UNEXPECTED WEIGHT CHANGE: 0
EYE PAIN: 0
HEADACHES: 0
CONSTIPATION: 0
PALPITATIONS: 0
FREQUENCY: 0
SHORTNESS OF BREATH: 0
APPETITE CHANGE: 0
VOMITING: 0
BRUISES/BLEEDS EASILY: 0
SLEEP DISTURBANCE: 1
FEVER: 0
BACK PAIN: 1
DYSPHORIC MOOD: 1
DIARRHEA: 0
HEMATURIA: 0
RECTAL PAIN: 0
SEIZURES: 0
SORE THROAT: 0
ALLERGIC/IMMUNOLOGIC NEGATIVE: 1
HEMATOLOGIC/LYMPHATIC NEGATIVE: 1
DIFFICULTY URINATING: 0

## 2018-09-26 ENCOUNTER — HEALTH MAINTENANCE LETTER (OUTPATIENT)
Age: 18
End: 2018-09-26

## 2018-10-04 ENCOUNTER — OFFICE VISIT (OUTPATIENT)
Dept: FAMILY MEDICINE | Facility: CLINIC | Age: 18
End: 2018-10-04
Payer: COMMERCIAL

## 2018-10-04 VITALS
OXYGEN SATURATION: 97 % | BODY MASS INDEX: 21.08 KG/M2 | SYSTOLIC BLOOD PRESSURE: 103 MMHG | HEART RATE: 88 BPM | WEIGHT: 130.6 LBS | DIASTOLIC BLOOD PRESSURE: 70 MMHG | TEMPERATURE: 98.2 F | RESPIRATION RATE: 20 BRPM

## 2018-10-04 DIAGNOSIS — N92.6 IRREGULAR MENSTRUAL CYCLE: Primary | Chronic | ICD-10-CM

## 2018-10-04 ASSESSMENT — ENCOUNTER SYMPTOMS
APPETITE CHANGE: 0
ACTIVITY CHANGE: 0

## 2018-10-04 NOTE — PROGRESS NOTES
Preceptor Attestation:   Patient seen, evaluated and discussed with the resident. I have verified the content of the note, which accurately reflects my assessment of the patient and the plan of care.   Supervising Physician:  Lyubov Kumar MD

## 2018-10-04 NOTE — MR AVS SNAPSHOT
After Visit Summary   10/4/2018    Sheri Fuller    MRN: [id number]           Patient Information     Date Of Birth          2000        Visit Information        Provider Department      10/4/2018 1:40 PM Savita Teague MD [facility]        Today's Diagnoses     Irregular menstrual cycle    -  1      Care Instructions    Here is the plan from today's visit    1. Irregular menstrual cycle  Please have your vaginal ultrasound done. We will call you with the results. Please talk with your insurance about genetic testing. If you are interested, please let us know and we can place the referral.     Please call or return to clinic if your symptoms don't go away.    Follow up plan: pending ultrasound results    Thank you for coming to [facility] today.  Lab Testing:  **If you had lab testing today and your results are reassuring or normal they will be mailed to you or sent through [employer] within 7 days.   **If the lab tests need quick action we will call you with the results.  The phone number we will call with results is # [phone] (home) . If this is not the best number please call our clinic and change the number.  Medication Refills:  If you need any refills please call your pharmacy and they will contact us.   If you need to  your refill at a new pharmacy, please contact the new pharmacy directly. The new pharmacy will help you get your medications transferred faster.   Scheduling:  If you have any concerns about today's visit or wish to schedule another appointment please call our office during normal business hours [phone] (8-5:00 M-F)  If a referral was made to a [facility] and you don't get a call from central scheduling please call [phone].  If a Mammogram was ordered for you at [facility] call [phone] to schedule or change your appointment.  If you had an XRay/CT/Ultrasound/MRI ordered the number is  [phone] to schedule or change your radiology appointment.   Medical Concerns:  If you have urgent medical concerns please call [phone] at any time of the day.    Savita Teague MD            Follow-ups after your visit        Your next 10 appointments already scheduled     Oct 08, 2018  2:30 PM CDT   US TRANSVAGINAL NON OB with URUS1   [facility], [place], Ultrasound ([facility])    [facility] [facility] [address] [phone]           How do I prepare for my exam? (Food and drink instructions) Drink four 8-ounce glasses of fluid an hour before your exam. If you need to empty your bladder before your exam, try to release only a little urine. Then, drink another glass of fluid.  How do I prepare for my exam? (Other instructions) You may have up to two family members in the exam room. If you bring a small child, an adult must be there to care for him or her. No video or camera photography during the procedure.  What should I wear: Wear comfortable clothes.  How long does the exam take: Most ultrasounds take 30 to 60 minutes.  What should I bring: Bring a list of your medicines, including vitamins, minerals and over-the-counter drugs. It is safest to leave personal items at home.  Do I need a :  No  is needed.  What do I need to tell my doctor: Tell your doctor about any allergies you may have.  What should I do after the exam: No restrictions, You may resume normal activities.  What is this test: An ultrasound uses sound waves to make pictures of the body. Sound waves do not cause pain. The only discomfort may be the pressure of the wand against your skin or full bladder.  Who should I call with questions: If you have any questions, please call the Imaging Department where you will have your exam. Directions, parking instructions, and other information is available on our website, [email or web address].              Who to contact      Please call your clinic at [phone] to:    Ask questions about your health    Make or cancel appointments    Discuss your medicines    Learn about your test results    Speak to your doctor            Additional Information About Your Visit        MyChart Information     [employer] is an electronic gateway that provides easy, online access to your medical records. With [employer], you can request a clinic appointment, read your test results, renew a prescription or communicate with your care team.     To sign up for [employer] visit the website at [email or web address]   You will be asked to enter the access code listed below, as well as some personal information. Please follow the directions to create your username and password.     Your access code is: [email or web address]  Expires: 2018  1:50 AM     Your access code will  in 90 days. If you need help or a new code, please contact your [facility] or call [phone] for assistance.        Care EveryWhere ID     This is your Care EveryWhere ID. This could be used by other organizations to access your [facility] medical records  [id number]        Your Vitals Were     Pulse Temperature Respirations Last Period Pulse Oximetry Breastfeeding?    88 98.2  F (36.8  C) (Oral) 20 2018 97% No    BMI (Body Mass Index)                   21.08 kg/m2            Blood Pressure from Last 3 Encounters:   10/04/18 103/70   18 110/71   18 102/70    Weight from Last 3 Encounters:   10/04/18 130 lb 9.6 oz (59.2 kg) (54 %)*   18 131 lb 12.8 oz (59.8 kg) (57 %)*   18 131 lb 3.2 oz (59.5 kg) (56 %)*     * Growth percentiles are based on CDC 2-20 Years data.              Today, you had the following     No orders found for display       Primary Care Provider Office Phone # Fax #    Natasha Hansen [phone]       [facility] [address] [address]        Equal Access to Services      RADHA LUND : Hadii aad ash jing [place], waaxda luqadaha, qaybta kaalmada rosariojose, chirag pawel haylevi gonsalvesyoliab [place] . So [facility] [phone].    ATENCIÓN: Si habla español, tiene a lu disposición servicios gratuitos de asistencia lingüística. Llame al [phone].    We comply with applicable federal civil rights laws and [place] laws. We do not discriminate on the basis of race, color, national origin, age, disability, sex, sexual orientation, or gender identity.            Thank you!     Thank you for choosing [facility]  for your care. Our goal is always to provide you with excellent care. Hearing back from our patients is one way we can continue to improve our services. Please take a few minutes to complete the written survey that you may receive in the mail after your visit with us. Thank you!             Your Updated Medication List - Protect others around you: Learn how to safely use, store and throw away your medicines at www.disposemymeds.org.          This list is accurate as of 10/4/18  2:02 PM.  Always use your most recent med list.                   Brand Name Dispense Instructions for use Diagnosis    ibuprofen 600 MG tablet    ADVIL/MOTRIN    30 tablet    Take 1 tablet (600 mg) by mouth every 6 hours as needed for moderate pain        LORazepam 1 MG tablet    ATIVAN    1 tablet    Take 1 tablet (1 mg) by mouth once as needed 30 minutes prior to procedure.    Pre-operative anxiety       spironolactone 25 MG tablet    ALDACTONE    30 tablet    Take 2 tablets (50 mg) by mouth daily    Irregular menstrual cycle

## 2018-10-04 NOTE — PROGRESS NOTES
HPI       Sheri Fuller is a 17 year old  who presents for   Chief Complaint   Patient presents with     RECHECK     Results/ Follow up        Follow Up Labs  Patient presents to follow up labs for irregular periods. Patient reports Dr. Hansen was concerned for PCOS. All laboratory testing was normal including prolactin, testosterone, TSH, T4 and hemoglobin A1C. Patient has had irregular bleeding for over 5 years. Patient has failed on 2 OCPs. She reports a sister with confirmed genetic testing for MTHFR. She does have an outstanding family history of endometriosis, fibroids and PCOS. Patient has not completed her pelvic ultrasound.      Problem, Medication and Allergy Lists were reviewed and updated if needed..    Patient is an established patient of this clinic..         Review of Systems:   Review of Systems   Constitutional: Negative for activity change and appetite change.   Genitourinary: Positive for menstrual problem, pelvic pain and vaginal bleeding. Negative for vaginal discharge.          Physical Exam:     Vitals:    10/04/18 1344   BP: 103/70   Pulse: 88   Resp: 20   Temp: 98.2  F (36.8  C)   TempSrc: Oral   SpO2: 97%   Weight: 130 lb 9.6 oz (59.2 kg)     Body mass index is 21.08 kg/(m^2).  Vitals were reviewed and were normal     Physical Exam   Constitutional: She appears well-developed and well-nourished. No distress.   HENT:   Head: Normocephalic and atraumatic.   Cardiovascular: Normal rate, regular rhythm and intact distal pulses.  Exam reveals no gallop and no friction rub.    No murmur heard.  Pulmonary/Chest: Effort normal.   Neurological: She is alert.   Skin: Skin is warm and dry.   Extensive facial makeup covering cystic acne    Psychiatric: She has a normal mood and affect. Thought content normal.       Results:   No testing ordered today    Assessment and Plan        Sheri Fuller is a 16 y/o female who presents today for recheck.    Irregular menstrual cycle  Patient is following up  from previous visit for labs done to aid in etiology of menstrual irregularity. Patient was also interested in having an IUD placed for contraception. Labs were reviewed with patient. No labs showed abnormalities. Medical assistant aided with scheduling pelvic ultrasound. Encouraged patient to complete imaging and follow up afterwards for possible IUD placement. Counseled patient to call insurance to discuss genetic testing coverage as most frequently this is not cover and can be expensive. Will hold off of genetics referral till then.        There are no discontinued medications.    Options for treatment and follow-up care were reviewed with the patient. Sheri Fuller  engaged in the decision making process and verbalized understanding of the options discussed and agreed with the final plan.    Savita Teague MD  [facility] Resident PGY-2  [phone]    Those present during this appointment were: patient, myself and patient's significant other

## 2018-10-04 NOTE — PATIENT INSTRUCTIONS
Here is the plan from today's visit    1. Irregular menstrual cycle  Please have your vaginal ultrasound done. We will call you with the results. Please talk with your insurance about genetic testing. If you are interested, please let us know and we can place the referral.     Please call or return to clinic if your symptoms don't go away.    Follow up plan: pending ultrasound results    Thank you for coming to [facility] today.  Lab Testing:  **If you had lab testing today and your results are reassuring or normal they will be mailed to you or sent through [employer] within 7 days.   **If the lab tests need quick action we will call you with the results.  The phone number we will call with results is # [phone] (home) . If this is not the best number please call our clinic and change the number.  Medication Refills:  If you need any refills please call your pharmacy and they will contact us.   If you need to  your refill at a new pharmacy, please contact the new pharmacy directly. The new pharmacy will help you get your medications transferred faster.   Scheduling:  If you have any concerns about today's visit or wish to schedule another appointment please call our office during normal business hours [phone] (8-5:00 M-F)  If a referral was made to a [facility] and you don't get a call from central scheduling please call [phone].  If a Mammogram was ordered for you at [facility] call [phone] to schedule or change your appointment.  If you had an XRay/CT/Ultrasound/MRI ordered the number is [phone] to schedule or change your radiology appointment.   Medical Concerns:  If you have urgent medical concerns please call [phone] at any time of the day.    Savita Teague MD

## 2018-10-18 ENCOUNTER — OFFICE VISIT (OUTPATIENT)
Dept: FAMILY MEDICINE | Facility: CLINIC | Age: 18
End: 2018-10-18
Payer: COMMERCIAL

## 2018-10-18 VITALS
TEMPERATURE: 98.1 F | OXYGEN SATURATION: 96 % | SYSTOLIC BLOOD PRESSURE: 111 MMHG | HEART RATE: 90 BPM | BODY MASS INDEX: 21.14 KG/M2 | WEIGHT: 131 LBS | DIASTOLIC BLOOD PRESSURE: 77 MMHG

## 2018-10-18 DIAGNOSIS — N92.6 IRREGULAR MENSTRUAL CYCLE: Chronic | ICD-10-CM

## 2018-10-18 DIAGNOSIS — Z30.011 ENCOUNTER FOR INITIAL PRESCRIPTION OF CONTRACEPTIVE PILLS: Primary | ICD-10-CM

## 2018-10-18 DIAGNOSIS — Z23 ENCOUNTER FOR IMMUNIZATION: ICD-10-CM

## 2018-10-18 DIAGNOSIS — L70.0 ACNE VULGARIS: ICD-10-CM

## 2018-10-18 RX ORDER — SPIRONOLACTONE 25 MG/1
50 TABLET ORAL DAILY
Qty: 60 TABLET | Refills: 3 | Status: SHIPPED | OUTPATIENT
Start: 2018-10-18 | End: 2019-01-26

## 2018-10-18 RX ORDER — ACETAMINOPHEN AND CODEINE PHOSPHATE 120; 12 MG/5ML; MG/5ML
0.35 SOLUTION ORAL DAILY
Qty: 28 TABLET | Refills: 11 | Status: SHIPPED | OUTPATIENT
Start: 2018-10-18 | End: 2019-01-26

## 2018-10-18 ASSESSMENT — ENCOUNTER SYMPTOMS
HEADACHES: 1
EYES NEGATIVE: 1

## 2018-10-18 NOTE — PROGRESS NOTES
Preceptor Attestation:   Patient seen and discussed with the resident. Assessment and plan reviewed with resident and agreed upon.   Supervising Physician:  [facility]

## 2018-10-18 NOTE — MR AVS SNAPSHOT
After Visit Summary   10/18/2018    Sheri Fuller    MRN: [id number]           Patient Information     Date Of Birth          2000        Visit Information        Provider Department      10/18/2018 3:20 PM Sue Auguste MD [facility]        Today's Diagnoses     Encounter for immunization    -  1    Irregular menstrual cycle        Acne vulgaris          Care Instructions    Here is the plan from today's visit    1. Encounter for immunization  - ADMIN VACCINE, INITIAL  - TDAP VACCINE (BOOSTRIX)  - norethindrone (MICRONOR) 0.35 MG per tablet; Take 1 tablet (0.35 mg) by mouth daily  Dispense: 28 tablet; Refill: 11    2. Irregular menstrual cycle  - spironolactone (ALDACTONE) 25 MG tablet; Take 2 tablets (50 mg) by mouth daily  Dispense: 60 tablet; Refill: 3    Please call or return to clinic if your symptoms don't go away.    Follow up plan  Please make a clinic appointment for follow up with me (SUE AUGUSTE) in 6 months.      Thank you for coming to [facility] today.  Lab Testing:  **If you had lab testing today and your results are reassuring or normal they will be mailed to you or sent through [employer]iko within 7 days.   **If the lab tests need quick action we will call you with the results.  The phone number we will call with results is # [phone] (home) . If this is not the best number please call our clinic and change the number.  Medication Refills:  If you need any refills please call your pharmacy and they will contact us.   If you need to  your refill at a new pharmacy, please contact the new pharmacy directly. The new pharmacy will help you get your medications transferred faster.   Scheduling:  If you have any concerns about today's visit or wish to schedule another appointment please call our office during normal business hours [phone] (8-5:00 M-F)  If a referral was made to a [facility] and you don't get a  call from central scheduling please call [phone].  If a Mammogram was ordered for you at [facility] call [phone] to schedule or change your appointment.  If you had an XRay/CT/Ultrasound/MRI ordered the number is [phone] to schedule or change your radiology appointment.   Medical Concerns:  If you have urgent medical concerns please call [phone] at any time of the day.    Shaniqua Douglass MD            Follow-ups after your visit        Who to contact     Please call your clinic at [phone] to:    Ask questions about your health    Make or cancel appointments    Discuss your medicines    Learn about your test results    Speak to your doctor            Additional Information About Your Visit        Care EveryWhere ID     This is your Care EveryWhere ID. This could be used by other organizations to access your [facility] medical records  [id number]        Your Vitals Were     Pulse Temperature Pulse Oximetry BMI (Body Mass Index)          90 98.1  F (36.7  C) (Oral) 96% 21.14 kg/m2         Blood Pressure from Last 3 Encounters:   10/18/18 111/77   10/04/18 103/70   09/13/18 110/71    Weight from Last 3 Encounters:   10/18/18 131 lb (59.4 kg) (55 %)*   10/04/18 130 lb 9.6 oz (59.2 kg) (54 %)*   09/13/18 131 lb 12.8 oz (59.8 kg) (57 %)*     * Growth percentiles are based on [facility] 2-20 Years data.              We Performed the Following     ADMIN VACCINE, INITIAL     TDAP VACCINE (BOOSTRIX)          Today's Medication Changes          These changes are accurate as of 10/18/18  3:44 PM.  If you have any questions, ask your nurse or doctor.               Start taking these medicines.        Dose/Directions    norethindrone 0.35 MG per tablet   Commonly known as:  MICRONOR   Used for:  Encounter for immunization   Started by:  Shaniqua Douglass MD        Dose:  0.35 mg   Take 1 tablet (0.35 mg) by mouth daily   Quantity:  28 tablet   Refills:  11         Stop taking these medicines if you  haven't already. Please contact your care team if you have questions.     LORazepam 1 MG tablet   Commonly known as:  ATIVAN   Stopped by:  Shaniqua Douglass MD                Where to get your medicines      These medications were sent to [facility] [address] [facility] [address] AT [address] [place] [address] & [address]  [address] [facility] [address]     Phone:  [phone]     norethindrone 0.35 MG per tablet    spironolactone 25 MG tablet                Primary Care Provider Office Phone # Fax #    Natasha Hansen [phone]       [facility]IC[facility] [address] [facility] [address]        Equal Access to Services     RADHA LUND : Hadii chris aleman hadasho Soomaali, waaxda luqadaha, qaybta kaalmada adeegyada, [place]wood [place] . So [facility] [place] [phone].    ATENCIÓN: Si habla español, tiene a lu disposición servicios gratuitos de asistencia lingüística. [facility] [phone].    We comply with applicable federal civil rights laws and [place] laws. We do not discriminate on the basis of race, color, national origin, age, disability, sex, sexual orientation, or gender identity.            Thank you!     Thank you for choosing [facility]  for your care. Our goal is always to provide you with excellent care. Hearing back from our patients is one way we can continue to improve our services. Please take a few minutes to complete the written survey that you may receive in the mail after your visit with us. Thank you!             Your Updated Medication List - Protect others around you: Learn how to safely use, store and throw away your medicines at www.disposemymeds.org.          This list is accurate as of 10/18/18  3:44 PM.  Always use your most recent med list.                   Brand Name Dispense Instructions for use Diagnosis    ibuprofen 600 MG tablet    ADVIL/MOTRIN    30 tablet    Take 1 tablet (600 mg) by mouth every 6 hours  as needed for moderate pain        norethindrone 0.35 MG per tablet    MICRONOR    28 tablet    Take 1 tablet (0.35 mg) by mouth daily    Encounter for immunization       spironolactone 25 MG tablet    ALDACTONE    60 tablet    Take 2 tablets (50 mg) by mouth daily    Irregular menstrual cycle

## 2018-10-18 NOTE — PROGRESS NOTES
hospitals       Sheri Fuller is a 17 year old  who presents for   Chief Complaint   Patient presents with     RECHECK     labs, bc options     Refill Request     spironolactone     She presents for birth control. Last time she was on birth control was in 2017. Has had irregular menses for 5 years. Has menses roughly every 2 weeks with heavy flow for first 2 days. Has tried Lo-estrin and Ortho-tri cyclen in the past with side effect of nausea, so she discontinued them. Does not want to try any other estrogen-containing birth control and desires to try progestin-only pill. Does not want IUD, depo or nexplanon. Has PMS symptoms including dysmenorrhea and headaches. She presents with her significant other, Alana, who is a transwomen. Currently using condoms for birth control.     Acne: better since taking spironolactone. Reported that she has tried multiple topical acne treatments with no relief. States that her skin is very sensitive and is unable to use topical agents.     +++++++    Problem, Medication and Allergy Lists were reviewed and updated if needed..    Patient is an established patient of this clinic.  Social History     Social History     Marital status: Single     Spouse name: N/A     Number of children: N/A     Years of education: N/A     Social History Main Topics     Smoking status: Never Smoker     Smokeless tobacco: Never Used     Alcohol use No     Drug use: No     Sexual activity: Not Asked     Other Topics Concern     None     Social History Narrative   .         Review of Systems:   Review of Systems   Constitutional: Negative for fever.   Eyes: Negative.    Respiratory: Negative for cough.    Cardiovascular: Negative for chest pain.   Gastrointestinal: Negative for abdominal pain, diarrhea, nausea and vomiting.   Genitourinary: Positive for menstrual problem. Negative for dysuria.   Neurological: Positive for headaches.   Psychiatric/Behavioral: Negative for dysphoric mood.          Physical  Exam:     Vitals:    10/18/18 1504   BP: 111/77   Pulse: 90   Temp: 98.1  F (36.7  C)   TempSrc: Oral   SpO2: 96%   Weight: 131 lb (59.4 kg)     Body mass index is 21.14 kg/(m^2).  Vitals were reviewed and were normal     Physical Exam   Constitutional: She appears well-developed and well-nourished. No distress.   HENT:   Head: Normocephalic and atraumatic.   Eyes: Conjunctivae are normal. No scleral icterus.   Cardiovascular: Normal rate, regular rhythm and normal heart sounds.    No murmur heard.  Pulmonary/Chest: Effort normal and breath sounds normal. She has no wheezes.   Abdominal: Soft. Bowel sounds are normal. She exhibits no distension. There is no tenderness.   Skin: Skin is warm and dry. No rash noted.   Psychiatric: Her speech is normal and behavior is normal.   Flat affect        Results:   No testing ordered today    Assessment and Katelynn Wade was seen today for recheck and refill request.    Diagnoses and all orders for this visit:    1. Encounter for initial prescription of contraceptive pills  2. Irregular menstrual cycle  She presents requesting progestin-only birth control since she has had difficulty tolerating OCP's in the past incuding lo-estrin due to nausea. Recommended a different OCP with low estrogen, IUD or depo injection since this will help with menstrual irregularity and patient declined. Also recommended pelvic US for further evaluation and patient declined an this time.   -     norethindrone (MICRONOR) 0.35 MG per tablet; Take 1 tablet (0.35 mg) by mouth daily    3. Acne vulgaris  Continue spironolactone for acne and recommended use of an acne facial cleanser daily. Discussed the importance of birth control when taking spironolactone.   -     spironolactone (ALDACTONE) 25 MG tablet; Take 2 tablets (50 mg) by mouth daily    4. Encounter for immunization  -     ADMIN VACCINE, INITIAL  -     TDAP VACCINE (BOOSTRIX)       There are no discontinued medications.    Options for  treatment and follow-up care were reviewed with the patient. Sheri Fuller  engaged in the decision making process and verbalized understanding of the options discussed and agreed with the final plan.    Shaniqua Douglass DO

## 2018-10-18 NOTE — PATIENT INSTRUCTIONS
Here is the plan from today's visit    1. Encounter for immunization  - ADMIN VACCINE, INITIAL  - TDAP VACCINE (BOOSTRIX)  - norethindrone (MICRONOR) 0.35 MG per tablet; Take 1 tablet (0.35 mg) by mouth daily  Dispense: 28 tablet; Refill: 11    2. Irregular menstrual cycle  - spironolactone (ALDACTONE) 25 MG tablet; Take 2 tablets (50 mg) by mouth daily  Dispense: 60 tablet; Refill: 3    Please call or return to clinic if your symptoms don't go away.    Follow up plan  Please make a clinic appointment for follow up with me (SUE AUGUSTE) in 6 months.      Thank you for coming to [facility] today.  Lab Testing:  **If you had lab testing today and your results are reassuring or normal they will be mailed to you or sent through [employer] within 7 days.   **If the lab tests need quick action we will call you with the results.  The phone number we will call with results is # [phone] (home) . If this is not the best number please call our clinic and change the number.  Medication Refills:  If you need any refills please call your pharmacy and they will contact us.   If you need to  your refill at a new pharmacy, please contact the new pharmacy directly. The new pharmacy will help you get your medications transferred faster.   Scheduling:  If you have any concerns about today's visit or wish to schedule another appointment please call our office during normal business hours [phone] (8-5:00 M-F)  If a referral was made to a [facility] and you don't get a call from central scheduling please call [phone].  If a Mammogram was ordered for you at [facility] call [phone] to schedule or change your appointment.  If you had an XRay/CT/Ultrasound/MRI ordered the number is [phone] to schedule or change your radiology appointment.   Medical Concerns:  If you have urgent medical concerns please call [phone] at any time of the day.    Sue Auguste MD

## 2018-10-21 ASSESSMENT — ENCOUNTER SYMPTOMS
DYSURIA: 0
FEVER: 0
ABDOMINAL PAIN: 0
NAUSEA: 0
DYSPHORIC MOOD: 0
VOMITING: 0
DIARRHEA: 0
COUGH: 0

## 2018-11-14 ENCOUNTER — HEALTH MAINTENANCE LETTER (OUTPATIENT)
Age: 18
End: 2018-11-14

## 2018-11-28 ENCOUNTER — HEALTH MAINTENANCE LETTER (OUTPATIENT)
Age: 18
End: 2018-11-28

## 2018-12-05 ENCOUNTER — HOSPITAL ENCOUNTER (EMERGENCY)
Facility: CLINIC | Age: 18
Discharge: HOME | End: 2018-12-05
Attending: EMERGENCY MEDICINE | Admitting: EMERGENCY MEDICINE
Payer: COMMERCIAL

## 2018-12-05 ENCOUNTER — OFFICE VISIT (OUTPATIENT)
Dept: FAMILY MEDICINE | Facility: CLINIC | Age: 18
End: 2018-12-05
Payer: COMMERCIAL

## 2018-12-05 VITALS
SYSTOLIC BLOOD PRESSURE: 114 MMHG | TEMPERATURE: 98.9 F | RESPIRATION RATE: 16 BRPM | DIASTOLIC BLOOD PRESSURE: 69 MMHG | OXYGEN SATURATION: 99 % | BODY MASS INDEX: 22.43 KG/M2 | HEART RATE: 103 BPM | HEIGHT: 67 IN | WEIGHT: 142.9 LBS

## 2018-12-05 VITALS
TEMPERATURE: 100.8 F | HEART RATE: 123 BPM | WEIGHT: 140.6 LBS | DIASTOLIC BLOOD PRESSURE: 70 MMHG | OXYGEN SATURATION: 98 % | BODY MASS INDEX: 22.69 KG/M2 | SYSTOLIC BLOOD PRESSURE: 118 MMHG | RESPIRATION RATE: 16 BRPM

## 2018-12-05 DIAGNOSIS — J36 TONSILLAR ABSCESS: Primary | ICD-10-CM

## 2018-12-05 DIAGNOSIS — J02.9 ACUTE PHARYNGITIS, UNSPECIFIED ETIOLOGY: ICD-10-CM

## 2018-12-05 DIAGNOSIS — J03.90 TONSILLITIS: ICD-10-CM

## 2018-12-05 LAB
ALBUMIN SERPL-MCNC: 4.2 G/DL (ref 3.4–5)
ALBUMIN UR-MCNC: NEGATIVE MG/DL
ALP SERPL-CCNC: 96 U/L (ref 40–150)
ALT SERPL W P-5'-P-CCNC: 18 U/L (ref 0–50)
ANION GAP SERPL CALCULATED.3IONS-SCNC: 7 MMOL/L (ref 3–14)
APPEARANCE UR: CLEAR
AST SERPL W P-5'-P-CCNC: 17 U/L (ref 0–45)
BASOPHILS # BLD AUTO: 0 10E9/L (ref 0–0.2)
BASOPHILS NFR BLD AUTO: 0.1 %
BILIRUB SERPL-MCNC: 0.3 MG/DL (ref 0.2–1.3)
BILIRUB UR QL STRIP: NEGATIVE
BUN SERPL-MCNC: 9 MG/DL (ref 7–30)
CALCIUM SERPL-MCNC: 9.2 MG/DL (ref 8.5–10.1)
CHLORIDE SERPL-SCNC: 103 MMOL/L (ref 94–109)
CO2 SERPL-SCNC: 26 MMOL/L (ref 20–32)
COLOR UR AUTO: ABNORMAL
CREAT SERPL-MCNC: 0.76 MG/DL (ref 0.52–1.04)
DIFFERENTIAL METHOD BLD: ABNORMAL
EOSINOPHIL # BLD AUTO: 0 10E9/L (ref 0–0.7)
EOSINOPHIL NFR BLD AUTO: 0.3 %
ERYTHROCYTE [DISTWIDTH] IN BLOOD BY AUTOMATED COUNT: 12.7 % (ref 10–15)
GFR SERPL CREATININE-BSD FRML MDRD: >90 ML/MIN/1.7M2
GLUCOSE SERPL-MCNC: 96 MG/DL (ref 70–99)
GLUCOSE UR STRIP-MCNC: NEGATIVE MG/DL
HCG SERPL QL: NEGATIVE
HCG UR QL: NEGATIVE
HCT VFR BLD AUTO: 39.4 % (ref 35–47)
HGB BLD-MCNC: 12.9 G/DL (ref 11.7–15.7)
HGB UR QL STRIP: NEGATIVE
IMM GRANULOCYTES # BLD: 0 10E9/L (ref 0–0.4)
IMM GRANULOCYTES NFR BLD: 0.2 %
KETONES UR STRIP-MCNC: 40 MG/DL
LEUKOCYTE ESTERASE UR QL STRIP: NEGATIVE
LYMPHOCYTES # BLD AUTO: 1.1 10E9/L (ref 0.8–5.3)
LYMPHOCYTES NFR BLD AUTO: 9.7 %
MCH RBC QN AUTO: 30.4 PG (ref 26.5–33)
MCHC RBC AUTO-ENTMCNC: 32.7 G/DL (ref 31.5–36.5)
MCV RBC AUTO: 93 FL (ref 78–100)
MONOCYTES # BLD AUTO: 0.5 10E9/L (ref 0–1.3)
MONOCYTES NFR BLD AUTO: 4.7 %
MUCOUS THREADS #/AREA URNS LPF: PRESENT /LPF
NEUTROPHILS # BLD AUTO: 9.9 10E9/L (ref 1.6–8.3)
NEUTROPHILS NFR BLD AUTO: 85 %
NITRATE UR QL: NEGATIVE
NRBC # BLD AUTO: 0 10*3/UL
NRBC BLD AUTO-RTO: 0 /100
PH UR STRIP: 7 PH (ref 5–7)
PLATELET # BLD AUTO: 258 10E9/L (ref 150–450)
POTASSIUM SERPL-SCNC: 3.5 MMOL/L (ref 3.4–5.3)
PROT SERPL-MCNC: 8.9 G/DL (ref 6.8–8.8)
RBC # BLD AUTO: 4.24 10E12/L (ref 3.8–5.2)
RBC #/AREA URNS AUTO: 2 /HPF (ref 0–2)
SODIUM SERPL-SCNC: 136 MMOL/L (ref 133–144)
SOURCE: ABNORMAL
SP GR UR STRIP: 1.01 (ref 1–1.03)
SQUAMOUS #/AREA URNS AUTO: 1 /HPF (ref 0–1)
UROBILINOGEN UR STRIP-MCNC: NORMAL MG/DL (ref 0–2)
WBC # BLD AUTO: 11.6 10E9/L (ref 4–11)
WBC #/AREA URNS AUTO: <1 /HPF (ref 0–5)

## 2018-12-05 PROCEDURE — 99285 EMERGENCY DEPT VISIT HI MDM: CPT | Mod: Z6 | Performed by: EMERGENCY MEDICINE

## 2018-12-05 PROCEDURE — 80053 COMPREHEN METABOLIC PANEL: CPT | Performed by: EMERGENCY MEDICINE

## 2018-12-05 PROCEDURE — 81025 URINE PREGNANCY TEST: CPT | Performed by: EMERGENCY MEDICINE

## 2018-12-05 PROCEDURE — 99285 EMERGENCY DEPT VISIT HI MDM: CPT | Mod: 25 | Performed by: EMERGENCY MEDICINE

## 2018-12-05 PROCEDURE — 96365 THER/PROPH/DIAG IV INF INIT: CPT | Performed by: EMERGENCY MEDICINE

## 2018-12-05 PROCEDURE — 81001 URINALYSIS AUTO W/SCOPE: CPT | Performed by: EMERGENCY MEDICINE

## 2018-12-05 PROCEDURE — 96361 HYDRATE IV INFUSION ADD-ON: CPT | Performed by: EMERGENCY MEDICINE

## 2018-12-05 PROCEDURE — 25000128 H RX IP 250 OP 636: Performed by: EMERGENCY MEDICINE

## 2018-12-05 PROCEDURE — 85025 COMPLETE CBC W/AUTO DIFF WBC: CPT | Performed by: EMERGENCY MEDICINE

## 2018-12-05 PROCEDURE — 84703 CHORIONIC GONADOTROPIN ASSAY: CPT | Performed by: EMERGENCY MEDICINE

## 2018-12-05 PROCEDURE — 96375 TX/PRO/DX INJ NEW DRUG ADDON: CPT | Performed by: EMERGENCY MEDICINE

## 2018-12-05 PROCEDURE — 87040 BLOOD CULTURE FOR BACTERIA: CPT | Performed by: EMERGENCY MEDICINE

## 2018-12-05 PROCEDURE — 25000132 ZZH RX MED GY IP 250 OP 250 PS 637: Performed by: EMERGENCY MEDICINE

## 2018-12-05 RX ORDER — AMPICILLIN AND SULBACTAM 2; 1 G/1; G/1
3 INJECTION, POWDER, FOR SOLUTION INTRAMUSCULAR; INTRAVENOUS ONCE
Status: COMPLETED | OUTPATIENT
Start: 2018-12-05 | End: 2018-12-05

## 2018-12-05 RX ORDER — HYDROMORPHONE HYDROCHLORIDE 1 MG/ML
0.5 INJECTION, SOLUTION INTRAMUSCULAR; INTRAVENOUS; SUBCUTANEOUS
Status: COMPLETED | OUTPATIENT
Start: 2018-12-05 | End: 2018-12-05

## 2018-12-05 RX ORDER — ACETAMINOPHEN 325 MG/10.15ML
650 LIQUID ORAL EVERY 6 HOURS PRN
Qty: 118 ML | Refills: 0 | Status: SHIPPED | OUTPATIENT
Start: 2018-12-05 | End: 2018-12-26

## 2018-12-05 RX ADMIN — ACETAMINOPHEN 650 MG: 325 SOLUTION ORAL at 13:05

## 2018-12-05 RX ADMIN — SODIUM CHLORIDE 1000 ML: 9 INJECTION, SOLUTION INTRAVENOUS at 11:12

## 2018-12-05 RX ADMIN — Medication 0.5 MG: at 11:36

## 2018-12-05 RX ADMIN — AMPICILLIN SODIUM AND SULBACTAM SODIUM 3 G: 2; 1 INJECTION, POWDER, FOR SOLUTION INTRAMUSCULAR; INTRAVENOUS at 14:34

## 2018-12-05 ASSESSMENT — ENCOUNTER SYMPTOMS
COUGH: 0
ABDOMINAL PAIN: 0
VOICE CHANGE: 0
SORE THROAT: 1
SORE THROAT: 1
DIARRHEA: 0
EYE REDNESS: 0
HEADACHES: 1
VOICE CHANGE: 1
MYALGIAS: 1
SHORTNESS OF BREATH: 1
NAUSEA: 1
EYE DISCHARGE: 0
FACIAL SWELLING: 0
WHEEZING: 0
TROUBLE SWALLOWING: 1
VOMITING: 1
TROUBLE SWALLOWING: 1
SHORTNESS OF BREATH: 1
FEVER: 1
SINUS PRESSURE: 0
RHINORRHEA: 0

## 2018-12-05 NOTE — PROGRESS NOTES
HPI       Sheri Fuller is a 18 year old  who presents for   Chief Complaint   Patient presents with     Throat Pain     x's 1 week of swollen tonsils. Difficulty time swallowing. Swabbed yesterday for strep and it was negative [facility]. Loss of appetite.      Ear Problem     left ear pain when she swallows. Sharp constant pain. Taking tylenol and ibuprofen to help.      Nausea     started yesterday only threw up one time.      Fever     101.4 fever this morning pre medication. Body aches, chills and night sweats.        Acute Illness   Concerns: sore throat, fever, night sweat  When did it start? 5 days  Is it getting better, worse or staying the same? worse:     Fatigue/Achiness?: YES whole body    Fever?:  .7 without antipyretics    Chills/Sweats?:  YES     Headache (location?) YES bitemporal, forehead    Sinus Pressure?:No     Eye redness/Discharge?: No     Ear Pain?:  YES L ear    Runny nose?: No     Congestion?: No     Sore Throat?:  YES severe, worsening, difficult to swallow any food or liquids  Respiratory    Cough?: no     Wheeze?: No   GI/    Decreased Appetite?:  YES     Nausea?: YES no abdominal pain    Vomiting?:  YES once yesterday    Diarrhea?:  No     Dysuria/Frequency?.: YES some dysuria since yesterday      Any Illness Exposure?: No     Any foreign travel or contact with anyone ill who travelled abroad? No     Therapies Tried and outcome: ibuprofen, tylenol, benadryl, Details: less the last few days        +++++++      Problem, Medication and Allergy Lists were reviewed and updated if needed..    Patient is an established patient of this clinic..         Review of Systems:   Review of Systems   HENT: Positive for sore throat and trouble swallowing. Negative for drooling, facial swelling and voice change.    Respiratory: Positive for shortness of breath (mild).             Physical Exam:     Vitals:    12/05/18 0946   BP: 118/70   Pulse: 123   Resp: 16   Temp: 100.8  F  (38.2  C)   TempSrc: Oral   SpO2: 98%   Weight: 140 lb 9.6 oz (63.8 kg)     Body mass index is 22.69 kg/(m^2).  Vital signs normal except elevated temperature and tachycardia     Physical Exam   Constitutional: She is oriented to person, place, and time. She appears well-developed. She appears distressed.   HENT:   Head: Normocephalic and atraumatic.   Right Ear: External ear normal.   Left Ear: External ear normal.   Nose: Nose normal.   Mouth/Throat: Mucous membranes are not dry. No oral lesions. No trismus in the jaw. No uvula swelling. Posterior oropharyngeal erythema and tonsillar abscesses (left, no visible exudate) present.       Bilateral cerumen impaction   Eyes: Conjunctivae are normal. Right eye exhibits no discharge. Left eye exhibits no discharge.   Cardiovascular: Normal rate and regular rhythm.    Pulmonary/Chest: Effort normal and breath sounds normal. No stridor. No respiratory distress.   Abdominal: Soft. There is no tenderness.   Musculoskeletal: She exhibits no deformity.   Lymphadenopathy:     She has cervical adenopathy.   Neurological: She is alert and oriented to person, place, and time.   Skin: She is not diaphoretic. There is pallor.         Results:   No testing ordered today     12/4: negative rapid strep and mono spot at urgent care    Assessment and Plan      Sheri Pena is a 18-year-old female with no significant past medical history who presents today for worsening sore throat, dysphagia, and fever of 101.7. Her mono and rapid strep were negative at urgent care yesterday.  Vitals today are significant for an elevated temp of 100.8 with antipyretics on board and tachycardia of 123.  She appears acutely ill on exam, and has a left tonsillar abscess without airway compromise. I informed the patient and her partner to present to the [facility] emergency room for antibiotics and abscess drainage with the help of ENT.  They were in agreement with this plan.  1. Kassy-tonsillar abscess        There are no discontinued medications.    Options for treatment and follow-up care were reviewed with the patient. Sheri Fuller  engaged in the decision making process and verbalized understanding of the options discussed and agreed with the final plan.    Jesus Hogan DO

## 2018-12-05 NOTE — ED AVS SNAPSHOT
[facility], [place], Emergency Department    [address] [place] [address]    Phone:  [phone]                                       Sheri Fuller   MRN: [id number]    Department:  [facility], Emergency Department   Date of Visit:  12/5/2018           After Visit Summary Signature Page     I have received my discharge instructions, and my questions have been answered. I have discussed any challenges I see with this plan with the nurse or doctor.    ..........................................................................................................................................  Patient/Patient Representative Signature      ..........................................................................................................................................  Patient Representative Print Name and Relationship to Patient    ..................................................               ................................................  Date                                   Time    ..........................................................................................................................................  Reviewed by Signature/Title    ...................................................              ..............................................  Date                                               Time          22EPIC Rev 08/18

## 2018-12-05 NOTE — DISCHARGE INSTRUCTIONS
Return to the emergency department if symptoms continue, get worse, there are any new symptoms or any cause for concern.  Please make an appointment to follow up with [facility] (phone: [phone]) in 7 days as needed.        When You Have a Sore Throat    A sore throat can be painful. There are many reasons why you may have a sore throat. Your healthcare provider will work with you to find the cause of your sore throat. He or she will also find the best treatment for you.  What causes a sore throat?  Sore throats can be caused or worsened by:    Cold or flu viruses    Bacteria    Irritants such as tobacco smoke or air pollution    Acid reflux  A healthy throat  The tonsils are on the sides of the throat near the base of the tongue. They collect viruses and bacteria and help fight infection. The throat (pharynx) is the passage for air. Mucus from the nasal cavity also moves down the passage.  An inflamed throat  The tonsils and pharynx can become inflamed due to a cold or flu virus. Postnasal drip (excess mucus draining from the nasal cavity) can irritate the throat. It can also make the throat or tonsils more likely to be infected by bacteria. Severe, untreated tonsillitis in children or adults can cause a pocket of pus (abscess) to form near the tonsil.  Your evaluation  A medical evaluation can help find the cause of your sore throat. It can also help your healthcare provider choose the best treatment for you. The evaluation may include a health history, physical exam, and diagnostic tests.  Health history  Your healthcare provider may ask you the following:    How long has the sore throat lasted and how have you been treating it?    Do you have any other symptoms, such as body aches, fever, or cough?    Does your sore throat recur? If so, how often? How many days of school or work have you missed because of a sore throat?    Do you have trouble eating or swallowing?    Have you been told that  "you snore or have other sleep problems?    Do you have bad breath?    Do you cough up bad-tasting mucus?  Physical exam  During the exam, your healthcare provider checks your ears, nose, and throat for problems. He or she also checks for swelling in the neck, and may listen to your chest.  Possible tests  Other tests your healthcare provider may perform include:    A throat swab to check for bacteria such as streptococcus (the bacteria that causes strep throat)    A blood test to check for mononucleosis (a viral infection)    A chest X-ray to rule out pneumonia, especially if you have a cough  Treating a sore throat  Treatment depends on many factors. What is the likely cause? Is the problem recent? Does it keep coming back? In many cases, the best thing to do is to treat the symptoms, rest, and let the problem heal itself. Antibiotics may help clear up some bacterial infections. For cases of severe or recurring tonsillitis, the tonsils may need to be removed.  Relieving your symptoms    Don t smoke, and avoid secondhand smoke.    For children, try throat sprays or Popsicles. Adults and older children may try lozenges.    Drink warm liquids to soothe the throat and help thin mucus. Avoid alcohol, spicy foods, and acidic drinks such as orange juice. These can irritate the throat.    Gargle with warm saltwater (1 teaspoon of salt to 8 ounces of warm water).    Use a humidifier to keep air moist and relieve throat dryness.    Try over-the-counter pain relievers such as acetaminophen or ibuprofen. Use as directed, and don t exceed the recommended dose. Don t give aspirin to children.   Are antibiotics needed?  If your sore throat is due to a bacterial infection, antibiotics may speed healing and prevent complications. Although group A streptococcus (\"strep throat\" or GAS) is the major treatable infection for a sore throat, GAS causes only 5% to 15% of sore throats in adults who seek medical care. Most sore throats are " caused by cold or flu viruses. And antibiotics don t treat viral illness. In fact, using antibiotics when they re not needed may produce bacteria that are harder to kill. Your healthcare provider will prescribe antibiotics only if he or she thinks they are likely to help.  If antibiotics are prescribed  Take the medicine exactly as directed. Be sure to finish your prescription even if you re feeling better. And be sure to ask your healthcare provider or pharmacist what side effects are common and what to do about them.  Is surgery needed?  In some cases, tonsils need to be removed. This is often done as outpatient (same-day) surgery. Your healthcare provider may advise removing the tonsils in cases of:    Several severe bouts of tonsillitis in a year.  Severe  episodes include those that lead to missed days of school or work, or that need to be treated with antibiotics.    Tonsillitis that causes breathing problems during sleep    Tonsillitis caused by food particles collecting in pouches in the tonsils (cryptic tonsillitis)  Call your healthcare provider if any of the following occur:    Symptoms worsen, or new symptoms develop.    Swollen tonsils make breathing difficult.    The pain is severe enough to keep you from drinking liquids.    A skin rash, hives, or wheezing develops. Any of these could signal an allergic reaction to antibiotics.    Symptoms don t improve within a week.    Symptoms don t improve within 2 to 3 days of starting antibiotics.   Date Last Reviewed: 10/1/2016    1804-6445 [employer]. [address] [place] [address], [place] [address]. All rights reserved. This information is not intended as a substitute for professional medical care. Always follow your healthcare professional's instructions.

## 2018-12-05 NOTE — ED TRIAGE NOTES
"Triage Assessment & Note:    /69  Pulse 123  Temp 102.9  F (39.4  C) (Oral)  Resp 18  Ht 1.702 m (5' 7\")  Wt 64.8 kg (142 lb 14.4 oz)  LMP 11/22/2018 (Exact Date)  SpO2 98%  BMI 22.38 kg/m2    Patient presents with: c/o draining tonsillary abscess. PT is noted to be tachycardic and febrile in triage. Pt reports throat pain and difficulty swallowing. PT reports she feels like she is unable to get enough air in to breath. ABC's WDL.     Home Treatments/Remedies:    Febrile / Afebrile? Febrile    Duration of C/o: 1 week    Galindo Martin  December 5, 2018      "

## 2018-12-05 NOTE — PATIENT INSTRUCTIONS
Here is the plan from today's visit    1. Tonsillar abscess  Please go to the [place] ER on [facility] (not [place] You will need this abscess drained by ENT and antibiotics.      Please call or return to clinic if your symptoms don't go away.    Follow up plan  Please make a clinic appointment for follow up with me (ALVIN RANKIN) afterwards for ER follow up    Thank you for coming to [facility] today.  Lab Testing:  **If you had lab testing today and your results are reassuring or normal they will be mailed to you or sent through [employer] within 7 days.   **If the lab tests need quick action we will call you with the results.  The phone number we will call with results is # [phone] (home) . If this is not the best number please call our clinic and change the number.  Medication Refills:  If you need any refills please call your pharmacy and they will contact us.   If you need to  your refill at a new pharmacy, please contact the new pharmacy directly. The new pharmacy will help you get your medications transferred faster.   Scheduling:  If you have any concerns about today's visit or wish to schedule another appointment please call our office during normal business hours [phone] (8-5:00 M-F)  If a referral was made to a [facility] and you don't get a call from central scheduling please call [phone].  If a Mammogram was ordered for you at [facility] call [phone] to schedule or change your appointment.  If you had an XRay/CT/Ultrasound/MRI ordered the number is [phone] to schedule or change your radiology appointment.   Medical Concerns:  If you have urgent medical concerns please call [phone] at any time of the day.    Alvin Rankin, DO

## 2018-12-05 NOTE — ED AVS SNAPSHOT
[facility], Emergency Department    [address] [address]    Phone:  [phone]                                       Sheri Fuller   MRN: [id number]    Department:  [facility], Emergency Department   Date of Visit:  12/5/2018           Patient Information     Date Of Birth          2000        Your diagnoses for this visit were:     Acute pharyngitis, unspecified etiology     Tonsillitis        You were seen by Bob Nguyen DO.        Discharge Instructions       Return to the emergency department if symptoms continue, get worse, there are any new symptoms or any cause for concern.  Please make an appointment to follow up with [facility] (phone: [phone]) in 7 days as needed.        When You Have a Sore Throat    A sore throat can be painful. There are many reasons why you may have a sore throat. Your healthcare provider will work with you to find the cause of your sore throat. He or she will also find the best treatment for you.  What causes a sore throat?  Sore throats can be caused or worsened by:    Cold or flu viruses    Bacteria    Irritants such as tobacco smoke or air pollution    Acid reflux  A healthy throat  The tonsils are on the sides of the throat near the base of the tongue. They collect viruses and bacteria and help fight infection. The throat (pharynx) is the passage for air. Mucus from the nasal cavity also moves down the passage.  An inflamed throat  The tonsils and pharynx can become inflamed due to a cold or flu virus. Postnasal drip (excess mucus draining from the nasal cavity) can irritate the throat. It can also make the throat or tonsils more likely to be infected by bacteria. Severe, untreated tonsillitis in children or adults can cause a pocket of pus (abscess) to form near the tonsil.  Your evaluation  A medical evaluation can help find the cause of your sore throat. It can also help your healthcare provider choose the best  treatment for you. The evaluation may include a health history, physical exam, and diagnostic tests.  Health history  Your healthcare provider may ask you the following:    How long has the sore throat lasted and how have you been treating it?    Do you have any other symptoms, such as body aches, fever, or cough?    Does your sore throat recur? If so, how often? How many days of school or work have you missed because of a sore throat?    Do you have trouble eating or swallowing?    Have you been told that you snore or have other sleep problems?    Do you have bad breath?    Do you cough up bad-tasting mucus?  Physical exam  During the exam, your healthcare provider checks your ears, nose, and throat for problems. He or she also checks for swelling in the neck, and may listen to your chest.  Possible tests  Other tests your healthcare provider may perform include:    A throat swab to check for bacteria such as streptococcus (the bacteria that causes strep throat)    A blood test to check for mononucleosis (a viral infection)    A chest X-ray to rule out pneumonia, especially if you have a cough  Treating a sore throat  Treatment depends on many factors. What is the likely cause? Is the problem recent? Does it keep coming back? In many cases, the best thing to do is to treat the symptoms, rest, and let the problem heal itself. Antibiotics may help clear up some bacterial infections. For cases of severe or recurring tonsillitis, the tonsils may need to be removed.  Relieving your symptoms    Don t smoke, and avoid secondhand smoke.    For children, try throat sprays or Popsicles. Adults and older children may try lozenges.    Drink warm liquids to soothe the throat and help thin mucus. Avoid alcohol, spicy foods, and acidic drinks such as orange juice. These can irritate the throat.    Gargle with warm saltwater (1 teaspoon of salt to 8 ounces of warm water).    Use a humidifier to keep air moist and relieve throat  "dryness.    Try over-the-counter pain relievers such as acetaminophen or ibuprofen. Use as directed, and don t exceed the recommended dose. Don t give aspirin to children.   Are antibiotics needed?  If your sore throat is due to a bacterial infection, antibiotics may speed healing and prevent complications. Although group A streptococcus (\"strep throat\" or GAS) is the major treatable infection for a sore throat, GAS causes only 5% to 15% of sore throats in adults who seek medical care. Most sore throats are caused by cold or flu viruses. And antibiotics don t treat viral illness. In fact, using antibiotics when they re not needed may produce bacteria that are harder to kill. Your healthcare provider will prescribe antibiotics only if he or she thinks they are likely to help.  If antibiotics are prescribed  Take the medicine exactly as directed. Be sure to finish your prescription even if you re feeling better. And be sure to ask your healthcare provider or pharmacist what side effects are common and what to do about them.  Is surgery needed?  In some cases, tonsils need to be removed. This is often done as outpatient (same-day) surgery. Your healthcare provider may advise removing the tonsils in cases of:    Several severe bouts of tonsillitis in a year.  Severe  episodes include those that lead to missed days of school or work, or that need to be treated with antibiotics.    Tonsillitis that causes breathing problems during sleep    Tonsillitis caused by food particles collecting in pouches in the tonsils (cryptic tonsillitis)  Call your healthcare provider if any of the following occur:    Symptoms worsen, or new symptoms develop.    Swollen tonsils make breathing difficult.    The pain is severe enough to keep you from drinking liquids.    A skin rash, hives, or wheezing develops. Any of these could signal an allergic reaction to antibiotics.    Symptoms don t improve within a week.    Symptoms don t improve " within 2 to 3 days of starting antibiotics.   Date Last Reviewed: 10/1/2016    6028-6736 [employer]. [address] [place] [address], [place] [address]. All rights reserved. This information is not intended as a substitute for professional medical care. Always follow your healthcare professional's instructions.          24 Hour Appointment Hotline       To make an appointment at any [facility], call [phone] ([phone]). If you don't have a family doctor or clinic, we will help you find one. [place] clinics are conveniently located to serve the needs of you and your family.             Review of your medicines      START taking        Dose / Directions Last dose taken    Acetaminophen 325 MG/10.15ML Soln   Dose:  650 mg   Quantity:  118 mL        Take 650 mg by mouth every 6 hours as needed (Fever/pain)   Refills:  0        amoxicillin-clavulanate 875-125 MG tablet   Commonly known as:  AUGMENTIN   Dose:  1 tablet   Quantity:  14 tablet        Take 1 tablet by mouth 2 times daily   Refills:  0          Our records show that you are taking the medicines listed below. If these are incorrect, please call your family doctor or clinic.        Dose / Directions Last dose taken    ibuprofen 600 MG tablet   Commonly known as:  ADVIL/MOTRIN   Dose:  600 mg   Quantity:  30 tablet        Take 1 tablet (600 mg) by mouth every 6 hours as needed for moderate pain   Refills:  0        norethindrone 0.35 MG tablet   Commonly known as:  MICRONOR   Dose:  0.35 mg   Quantity:  28 tablet        Take 1 tablet (0.35 mg) by mouth daily   Refills:  11        spironolactone 25 MG tablet   Commonly known as:  ALDACTONE   Dose:  50 mg   Quantity:  60 tablet        Take 2 tablets (50 mg) by mouth daily   Refills:  3                Prescriptions were sent or printed at these locations (2 Prescriptions)                   Other Prescriptions                Printed at Department/Unit printer (2 of 2)         Acetaminophen  325 MG/10.15ML SOLN               amoxicillin-clavulanate (AUGMENTIN) 875-125 MG tablet                Procedures and tests performed during your visit     Procedure/Test Number of Times Performed    Blood culture 2    CBC with platelets differential 1    Cardiac Continuous Monitoring 1    Comprehensive metabolic panel 1    Draw and hold 1    HCG qualitative 1    HCG qualitative urine (UPT) 1    Peripheral IV: Standard 1    Pulse oximetry nursing 1    UA with Microscopic 1      Orders Needing Specimen Collection     None      Pending Results     Date and Time Order Name Status Description    12/5/2018 1425 Blood culture In process     12/5/2018 1425 Blood culture In process             Pending Culture Results     Date and Time Order Name Status Description    12/5/2018 1425 Blood culture In process     12/5/2018 1425 Blood culture In process             Pending Results Instructions     If you had any lab results that were not finalized at the time of your Discharge, you can call the ED Lab Result RN at [phone]. You will be contacted by this team for any positive Lab results or changes in treatment. The nurses are available 7 days a week from 10A to 6:30P.  You can leave a message 24 hours per day and they will return your call.        Thank you for choosing [facility]       Thank you for choosing [facility] for your care. Our goal is always to provide you with excellent care. Hearing back from our patients is one way we can continue to improve our services. Please take a few minutes to complete the written survey that you may receive in the mail after you visit with us. Thank you!        Care EveryWhere ID     This is your Care EveryWhere ID. This could be used by other organizations to access your [facility] medical records  [id number]        Equal Access to Services     RADHA LUND : Yaron Haywood, heather grossman, zeeshan dahlay idiin hayaan adesupriya domingo. So [facility]  [phone].    ATENCIÓN: Si habla español, tiene a lu disposición servicios gratuitos de asistencia lingüística. Llame al [phone].    We comply with applicable federal civil rights laws and [place] laws. We do not discriminate on the basis of race, color, national origin, age, disability, sex, sexual orientation, or gender identity.            After Visit Summary       This is your record. Keep this with you and show to your community pharmacist(s) and doctor(s) at your next visit.

## 2018-12-05 NOTE — MR AVS SNAPSHOT
After Visit Summary   12/5/2018    Sheri Fuller    MRN: [id number]           Patient Information     Date Of Birth          2000        Visit Information        Provider Department      12/5/2018 9:40 AM Alvin Rankin, DO [facility]        Today's Diagnoses     Tonsillar abscess    -  1      Care Instructions    Here is the plan from today's visit    1. Tonsillar abscess  Please go to the [place] ER on [facility] (not [place] You will need this abscess drained by ENT and antibiotics.      Please call or return to clinic if your symptoms don't go away.    Follow up plan  Please make a clinic appointment for follow up with me (ALVIN RANKIN) afterwards for ER follow up    Thank you for coming to [facility] today.  Lab Testing:  **If you had lab testing today and your results are reassuring or normal they will be mailed to you or sent through [employer] within 7 days.   **If the lab tests need quick action we will call you with the results.  The phone number we will call with results is # [phone] (home) . If this is not the best number please call our clinic and change the number.  Medication Refills:  If you need any refills please call your pharmacy and they will contact us.   If you need to  your refill at a new pharmacy, please contact the new pharmacy directly. The new pharmacy will help you get your medications transferred faster.   Scheduling:  If you have any concerns about today's visit or wish to schedule another appointment please call our office during normal business hours [phone] (8-5:00 M-F)  If a referral was made to a [facility] and you don't get a call from central scheduling please call [phone].  If a Mammogram was ordered for you at [facility] call [phone] to schedule or change your appointment.  If you had an XRay/CT/Ultrasound/MRI ordered the number is [phone] to  schedule or change your radiology appointment.   Medical Concerns:  If you have urgent medical concerns please call [phone] at any time of the day.    Sabina Hogan, DO            Follow-ups after your visit        Who to contact     Please call your clinic at [phone] to:    Ask questions about your health    Make or cancel appointments    Discuss your medicines    Learn about your test results    Speak to your doctor            Additional Information About Your Visit        Care EveryWhere ID     This is your Care EveryWhere ID. This could be used by other organizations to access your [facility] medical records  [id number]        Your Vitals Were     Pulse Temperature Respirations Last Period Pulse Oximetry BMI (Body Mass Index)    123 100.8  F (38.2  C) (Oral) 16 11/22/2018 (Exact Date) 98% 22.69 kg/m2       Blood Pressure from Last 3 Encounters:   12/05/18 118/70   10/18/18 111/77   10/04/18 103/70    Weight from Last 3 Encounters:   12/05/18 140 lb 9.6 oz (63.8 kg)   10/18/18 131 lb (59.4 kg) (55 %)*   10/04/18 130 lb 9.6 oz (59.2 kg) (54 %)*     * Growth percentiles are based on CDC 2-20 Years data.              Today, you had the following     No orders found for display       Primary Care Provider Office Phone # Fax #    Natasha Hansen [phone]       [facility] MEDICINE [address] [facility] [address]        Equal Access to Services     RADHA LUND AH: Hadii chris Haywood, waaxda chauncey, qaybta kaalmada ada, waxay pawel domingo. So [facility] [phone].    ATENCIÓN: Si habla español, tiene a lu disposición servicios gratuitos de asistencia lingüística. Llame al [phone].    We comply with applicable federal civil rights laws and [place] laws. We do not discriminate on the basis of race, color, national origin, age, disability, sex, sexual orientation, or gender identity.            Thank you!     Thank you for choosing  [facility]  for your care. Our goal is always to provide you with excellent care. Hearing back from our patients is one way we can continue to improve our services. Please take a few minutes to complete the written survey that you may receive in the mail after your visit with us. Thank you!             Your Updated Medication List - Protect others around you: Learn how to safely use, store and throw away your medicines at www.disposemymeds.org.          This list is accurate as of 12/5/18 10:15 AM.  Always use your most recent med list.                   Brand Name Dispense Instructions for use Diagnosis    ibuprofen 600 MG tablet    ADVIL/MOTRIN    30 tablet    Take 1 tablet (600 mg) by mouth every 6 hours as needed for moderate pain        norethindrone 0.35 MG tablet    MICRONOR    28 tablet    Take 1 tablet (0.35 mg) by mouth daily    Encounter for immunization       spironolactone 25 MG tablet    ALDACTONE    60 tablet    Take 2 tablets (50 mg) by mouth daily    Acne vulgaris

## 2018-12-05 NOTE — ED PROVIDER NOTES
History     Chief Complaint   Patient presents with     Pharyngitis     HPI  Sheri Fuller is a 18 year old otherwise healthy female, who presents to the Emergency Department for evaluation of a sore throat. Patient complains of worsening sore throat for the past five days with odynophagia with food or liquids. This is associated with fevers up to 101.7F this morning. Patient has tried ibuprofen and tylenol for fever without relief, and benadryl for sleep. Her last use of tylenol was 05:00 am this morning. Patient notes that her throat pain is worse on the left side more than the right. She endorses some shortness of breath that she attributes to her throat being tight, but denies any chest pain. She endorses myalgias, bitemporal and frontal headache, left otalgia when she swallows and change in voice. She also notes bad taste in her mouth. She denies any abdominal pain, but endorses nausea with and a few episodes of emesis yesterday and a decrease in appetite. She denies any rhinorrhea, congestion, sinus pressure, eye redness, eye discharge, cough, wheezing or diarrhea. She denies any recent ill contacts or travels abroad.    Per chart review, patient was in a [facility] yesterday where she had a negative strep swab and mono. Today, she was in a [facility] clinic where she was found to have a left tonsillar abscess and was sent here for further evaluation and treatment.     I have reviewed the Medications, Allergies, Past Medical and Surgical History, and Social History in the [employer] system.     PAST MEDICAL HISTORY:   Past Medical History:   Diagnosis Date     Migraine with aura     1 per 3 months.       PAST SURGICAL HISTORY: No past surgical history on file.    FAMILY HISTORY: No family history on file.    SOCIAL HISTORY:   Social History   Substance Use Topics     Smoking status: Never Smoker     Smokeless tobacco: Never Used     Alcohol use No     No current facility-administered medications for  "this encounter.      Current Outpatient Prescriptions   Medication     ibuprofen (ADVIL/MOTRIN) 600 MG tablet     norethindrone (MICRONOR) 0.35 MG per tablet     spironolactone (ALDACTONE) 25 MG tablet      No Known Allergies    Review of Systems   Constitutional: Positive for fever (up to 101.7 F).   HENT: Positive for ear pain (left ear), sore throat (L >R), trouble swallowing (due to pain) and voice change. Negative for congestion, rhinorrhea and sinus pressure.    Eyes: Negative for discharge and redness.   Respiratory: Positive for shortness of breath. Negative for cough and wheezing.    Cardiovascular: Negative for chest pain.   Gastrointestinal: Positive for nausea and vomiting. Negative for abdominal pain and diarrhea.   Musculoskeletal: Positive for myalgias.   Neurological: Positive for headaches (bitemporal and frontal).   All other systems reviewed and are negative.      Physical Exam   BP: 114/69  Pulse: 123  Temp: 102.9  F (39.4  C)  Resp: 18  Height: 170.2 cm (5' 7\")  Weight: 64.8 kg (142 lb 14.4 oz)  SpO2: 98 %      Physical Exam   Constitutional: She is oriented to person, place, and time. She appears well-developed and well-nourished. No distress.   HENT:   Head: Normocephalic and atraumatic.   Mouth/Throat: No uvula swelling. Oropharyngeal exudate present.       Eyes: Pupils are equal, round, and reactive to light. Right eye exhibits no discharge. Left eye exhibits no discharge. No scleral icterus.   Neck: Normal range of motion. Neck supple. No tracheal deviation present.   Cardiovascular: Regular rhythm, normal heart sounds and intact distal pulses.  Tachycardia present.  Exam reveals no gallop and no friction rub.    No murmur heard.  Pulmonary/Chest: Effort normal and breath sounds normal. No stridor. No respiratory distress. She has no wheezes. She exhibits no tenderness.   Abdominal: Soft. Bowel sounds are normal. She exhibits no distension. There is no tenderness.   Musculoskeletal: Normal " range of motion. She exhibits no edema, tenderness or deformity.   Lymphadenopathy:     She has cervical adenopathy.   Neurological: She is alert and oriented to person, place, and time. No cranial nerve deficit.   Skin: Skin is warm and dry. No rash noted. She is not diaphoretic. No erythema. No pallor.   Psychiatric: She has a normal mood and affect.   Nursing note and vitals reviewed.      ED Course     ED Course     Procedures             Critical Care time:  none             Labs Ordered and Resulted from Time of ED Arrival Up to the Time of Departure from the ED - No data to display         Assessments & Plan (with Medical Decision Making)   This is a 18-year-old female with no past medical history who was sent from clinic for pharyngitis and concern for peritonsillar abscess.  Symptoms have been present for 5 days but become worse in nature.  Not had any antibiotics for this.  Was concern for peritonsillar abscess as there is considerable left tonsillar swelling.  Vision was febrile with a temp of 102.9 and tachycardic with a pulse of 123.  No airway issues, patient was able to handle secretions.  Considerable left tonsillar swelling.  WBC count was 11.6.  No other acute lab abnormalities on CBC or CMP.  Patient was seen by ENT who states she does have considerable left-sided tonsillitis but they do not believe that she has a peritonsillar abscess.  They recommend antibiotics and follow-up with them if needed.  She was given fluids as well as hydromorphone for pain control in the emergency department.  He had a considerable improvement in her symptoms and was taking p.o.  Will discharge home with return precautions. Discussed reasons to return to the emergency department.  Patient understands and agrees with this plan.    I have reviewed the nursing notes.    I have reviewed the findings, diagnosis, plan and need for follow up with the patient.    New Prescriptions    No medications on file       Final  diagnoses:   None     I, Tiffanie White, am serving as a trained medical scribe to document services personally performed by Bob Nguyen DO based on the provider's statements to me.   IBob DO was physically present and have reviewed and verified the accuracy of this note documented by Tiffanie White.    12/5/2018   [facility], [place], EMERGENCY DEPARTMENT     Bob Nguyen DO  12/05/18 0829

## 2018-12-05 NOTE — PROGRESS NOTES
Preceptor Attestation:   Patient seen, evaluated and discussed with the resident. I have verified the content of the note, which accurately reflects my assessment of the patient and the plan of care.   Supervising Physician:  Enio Ramirez MD

## 2018-12-05 NOTE — ED NOTES
Bed: ED23  Expected date: 12/5/18  Expected time:   Means of arrival:   Comments:  Sheri Fuller 19 y/o F w/ no PMH  L peritonsillar abscess  T 100.8,

## 2018-12-11 LAB
BACTERIA SPEC CULT: NO GROWTH
BACTERIA SPEC CULT: NO GROWTH
Lab: NORMAL
Lab: NORMAL
SPECIMEN SOURCE: NORMAL
SPECIMEN SOURCE: NORMAL

## 2018-12-26 ENCOUNTER — OFFICE VISIT (OUTPATIENT)
Dept: FAMILY MEDICINE | Facility: CLINIC | Age: 18
End: 2018-12-26
Payer: COMMERCIAL

## 2018-12-26 VITALS
OXYGEN SATURATION: 95 % | DIASTOLIC BLOOD PRESSURE: 70 MMHG | HEART RATE: 80 BPM | RESPIRATION RATE: 16 BRPM | SYSTOLIC BLOOD PRESSURE: 103 MMHG | BODY MASS INDEX: 21.71 KG/M2 | WEIGHT: 138.6 LBS | TEMPERATURE: 97.9 F

## 2018-12-26 DIAGNOSIS — N93.8 DYSFUNCTIONAL UTERINE BLEEDING: ICD-10-CM

## 2018-12-26 DIAGNOSIS — L70.0 ACNE VULGARIS: Primary | ICD-10-CM

## 2018-12-26 RX ORDER — MINOCYCLINE HYDROCHLORIDE 50 MG/1
50 CAPSULE ORAL 2 TIMES DAILY
Qty: 56 CAPSULE | Refills: 0 | Status: SHIPPED | OUTPATIENT
Start: 2018-12-26 | End: 2019-02-03

## 2018-12-26 NOTE — PROGRESS NOTES
JONATHAN       Sheri Fuller is a 18 year old  who presents for   Chief Complaint   Patient presents with     RECHECK     medication recheck         Concern: Acne   Description of the problem :Started spironolactone in September for acne. Hasn't noticed much of a change. Also on OCP for 2 months- more acne with that. No prior acne treatments. Usually uses gentle cleanser and gentle moisturizers, has used salicylic acid topicals, benzoyl peroxide, alpha-hydroxyacid, has used retinoids too. Currently she's just using a topical with salicylic acid    Makeup: BB cream, concealer. Not every day. Regularly washes and uses new     Dysfunctional uterine bleeding.  Having period every other week now on progesterone minipill that lasts 5 days. Prior periods would last 7 days and occur every 7-14 days.  Taking pill every day, same time every day.  Previously 2 other OCPS, ortho-tricyclene, lo-estrene. Failed those 2/2 nausea.  Had pelvic U/s in [place] that was negative for polycystic ovaries. Has FHx of PCOS.  Had been scheduled for IUD but was anxious about procedure.         Adherence and Exercise  Medication side effects: no  How often is a medication missed? Never    +++++++  ER Follow up  - throat completely better after augmentin from ER  - no trouble swallowing now    Problem, Medication and Allergy Lists were reviewed and updated if needed..    Patient is an established patient of this clinic..         Review of Systems:   Review of Systems         Physical Exam:     Vitals:    12/26/18 1420   BP: 103/70   Pulse: 80   Resp: 16   Temp: 97.9  F (36.6  C)   TempSrc: Oral   SpO2: 95%   Weight: 62.9 kg (138 lb 9.6 oz)     Body mass index is 21.71 kg/m .  Vitals were reviewed and were normal     Physical Exam   Constitutional: She is oriented to person, place, and time. She appears well-developed and well-nourished. No distress.   HENT:   Head: Normocephalic and atraumatic.   Right Ear: External ear normal.   Left Ear:  External ear normal.   Nose: Nose normal.   Mouth/Throat: Oropharynx is clear and moist. No oropharyngeal exudate.   Neck: Normal range of motion.   Pulmonary/Chest: Effort normal. No respiratory distress.   Musculoskeletal: She exhibits no deformity.   Neurological: She is alert and oriented to person, place, and time.   Skin: Skin is warm and dry. She is not diaphoretic.   Severe cystic acne present on face- majority of distribution on bilateral cheeks, jaw, upper neck   Psychiatric: She has a normal mood and affect.         Results:   No testing ordered today    Assessment and Plan      Sheri is a 18-year-old sister under woman who presents today for severe cystic acne vulgaris.  This is failed multiple attempts at hormonal management and topical therapies.  We will now try a course of antibiotics in combination with her current topical salicylate, and a referral to dermatology for possible isotretinoin therapy.  I encouraged her to continue using her progesterone contraceptive pill and her Spironolactone.  I did not increase her spring lactone dose today for concerns for hypotension given her already low blood pressure.  On exam she also appears to have cleared her throat infection nicely.  There is no evidence of remaining erythema, edema, abscess.  I also advised that since I am prescribing antibiotic today, and she has recently completed another course of antibiotics, that she may want to take a probiotic as a prophylactic.    1. Acne vulgaris  - DERMATOLOGY REFERRAL - INTERNAL  - minocycline (MINOCIN/DYNACIN) 50 MG capsule; Take 1 capsule (50 mg) by mouth 2 times daily for 28 days  Dispense: 56 capsule; Refill: 0    2. Dysfunctional uterine bleeding  There appears to be some minor improvement with the progesterone pill.  I informed the patient she does not meet criteria for PCOS, given that she does not have polycystic ovaries, or a pattern of amenorrhea or oligomenorrhea, though she does have features of  hyperandrogenism and a borderline testosterone level.  Regardless, the management for PCOS or dysfunctional uterine bleeding in her case would be the same.  I offered her Depo-Provera or a progesterone containing IUD, stating that the IUD is more of an invasive procedure but would provide the best relief for bleeding.  She states she is not excited about either option, given that she is afraid of needles and she is pretty anxious about the IUD placement procedure even after counseling.  I encouraged her to continue taking her progesterone minipill, and call in or send me a my chart message if she was interested in obtaining an IUD in the future.    She declined Tdap and flu immunizations today.     There are no discontinued medications.    Options for treatment and follow-up care were reviewed with the patient. Sheri Fuller  engaged in the decision making process and verbalized understanding of the options discussed and agreed with the final plan.    Jesus Hogan, DO

## 2018-12-26 NOTE — PATIENT INSTRUCTIONS
Here is the plan from today's visit    1. Acne vulgaris  - keep taking the birth control and spironolactone  - DERMATOLOGY REFERRAL - INTERNAL  - minocycline (MINOCIN/DYNACIN) 50 MG capsule; Take 1 capsule (50 mg) by mouth 2 times daily for 28 days  Dispense: 56 capsule; Refill: 0    2. Dysfunctional uterine bleeding  - please consider an IUD or depo provera      Please call or return to clinic if your symptoms don't go away.    Follow up plan  Please make a clinic appointment for follow up with me (ALVIN RANKIN) in 2  months for acne and uterine bleeding follow up.    Thank you for coming to [facility] today.  Lab Testing:  **If you had lab testing today and your results are reassuring or normal they will be mailed to you or sent through [facility] within 7 days.   **If the lab tests need quick action we will call you with the results.  The phone number we will call with results is # [phone] (home) . If this is not the best number please call our clinic and change the number.  Medication Refills:  If you need any refills please call your pharmacy and they will contact us.   If you need to  your refill at a new pharmacy, please contact the new pharmacy directly. The new pharmacy will help you get your medications transferred faster.   Scheduling:  If you have any concerns about today's visit or wish to schedule another appointment please call our office during normal business hours [phone] (8-5:00 M-F)  If a referral was made to a [facility] and you don't get a call from central scheduling please call [phone].  If a Mammogram was ordered for you at [facility] call [phone] to schedule or change your appointment.  If you had an XRay/CT/Ultrasound/MRI ordered the number is [phone] to schedule or change your radiology appointment.   Medical Concerns:  If you have urgent medical concerns please call [phone] at any time of the day.    Alvin  Leyla Hogan, DO
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
## 2019-01-23 PROBLEM — N92.6 IRREGULAR MENSTRUAL CYCLE: Chronic | Status: ACTIVE | Noted: 2018-09-12

## 2019-01-24 ENCOUNTER — OFFICE VISIT (OUTPATIENT)
Dept: FAMILY MEDICINE | Facility: CLINIC | Age: 19
End: 2019-01-24
Payer: COMMERCIAL

## 2019-01-24 VITALS
DIASTOLIC BLOOD PRESSURE: 83 MMHG | SYSTOLIC BLOOD PRESSURE: 122 MMHG | BODY MASS INDEX: 22.33 KG/M2 | HEART RATE: 63 BPM | TEMPERATURE: 98.6 F | OXYGEN SATURATION: 96 % | WEIGHT: 142.6 LBS

## 2019-01-24 DIAGNOSIS — F32.A DEPRESSION, UNSPECIFIED DEPRESSION TYPE: ICD-10-CM

## 2019-01-24 DIAGNOSIS — H53.2 DIPLOPIA: ICD-10-CM

## 2019-01-24 DIAGNOSIS — R25.2 MUSCLE CRAMPS: ICD-10-CM

## 2019-01-24 DIAGNOSIS — R53.83 FATIGUE, UNSPECIFIED TYPE: Primary | ICD-10-CM

## 2019-01-24 DIAGNOSIS — N92.6 IRREGULAR MENSTRUAL CYCLE: Chronic | ICD-10-CM

## 2019-01-24 LAB
% GRANULOCYTES: 62.4 %G (ref 40–75)
BUN SERPL-MCNC: 10.7 MG/DL (ref 7–19)
CALCIUM SERPL-MCNC: 9.5 MG/DL (ref 8.5–10.1)
CHLORIDE SERPLBLD-SCNC: 102.1 MMOL/L (ref 98–110)
CLOSURE TME COLL+EPINEP BLD: 132 SEC
CO2 SERPL-SCNC: 27 MMOL/L (ref 20–32)
CREAT SERPL-MCNC: 0.7 MG/DL (ref 0.5–1)
GFR SERPL CREATININE-BSD FRML MDRD: >90 ML/MIN/1.7 M2
GLUCOSE SERPL-MCNC: 104.1 MG'DL (ref 70–99)
GRANULOCYTES #: 3.1 K/UL (ref 1.6–8.3)
HCT VFR BLD AUTO: 40.1 % (ref 35–47)
HEMOGLOBIN: 12.1 G/DL (ref 11.7–15.7)
INR PPP: 1.1
LYMPHOCYTES # BLD AUTO: 1.5 K/UL (ref 0.8–5.3)
LYMPHOCYTES NFR BLD AUTO: 30.3 %L (ref 20–48)
MCH RBC QN AUTO: 29 PG (ref 26.5–35)
MCHC RBC AUTO-ENTMCNC: 30.2 G/DL (ref 32–36)
MCV RBC AUTO: 96.2 FL (ref 78–100)
MID #: 0.4 K/UL (ref 0–2.2)
MID %: 7.3 %M (ref 0–20)
PLATELET # BLD AUTO: 382 K/UL (ref 150–450)
POTASSIUM SERPL-SCNC: 4 MMOL/DL (ref 3.3–4.5)
RBC # BLD AUTO: 4.17 M/UL (ref 3.8–5.2)
SODIUM SERPL-SCNC: 139.5 MMOL/L (ref 132.6–141.4)
WBC # BLD AUTO: 4.9 K/UL (ref 4–11)

## 2019-01-24 RX ORDER — IBUPROFEN 600 MG/1
TABLET, FILM COATED ORAL
Refills: 0 | Status: ON HOLD | COMMUNITY
Start: 2018-08-28 | End: 2019-06-04

## 2019-01-24 RX ORDER — ACETAMINOPHEN 160 MG/5ML
LIQUID ORAL
Refills: 0 | Status: ON HOLD | COMMUNITY
Start: 2018-12-05 | End: 2019-06-04

## 2019-01-24 NOTE — PROGRESS NOTES
Preceptor Attestation:   Patient seen, evaluated and discussed with the resident. I have verified the content of the note, which accurately reflects my assessment of the patient and the plan of care.   Supervising Physician:  Nelly Katz MD

## 2019-01-24 NOTE — PROGRESS NOTES
"Primary Care Behavioral Health Consult Note    Meeting lasted: 10 minutes  Others present: significant other/spouse-patient's girlfriend \"Alana\"     Identifying Information and Presenting Problem:    Dr. Hogan requested behavioral health consultation for this patient regarding depressed mood.  The patient is a 18 year old American individual that agreed to be seen by behavioral health today.    Topics Discussed/Interventions Provided:       Gathered relevant information: Patient with longstanding symptoms of depression that have worsened in recent months. She has been having trouble motivating self to call to establish with therapist. Might also want to be seen by a psychiatrist, though not sure. Open to being seen here at [facility]. Appears preoccupied with physical concerns. Worried she has MS.     Discussed the role of the behavioral health service in the clinic, describe my role as a behavioral health fellow, and generally educate the patient on the psychotherapy services. The patient was given the opportunity to ask questions and state Aj ZACARIAS Jonny's goals/expectations for initiating services with the behavioral health team. The patient is interested in establishing psychotherapy with a member of the behavioral health team in house. I clearly explained to Aj that we may decide that she needs a higher level of care than can be provided by this clinic and she stated understanding.     Suicide risk: endorses fleeting thoughts of \"wanting to be gone.\" States she does not want to die, just wants emotional and physical pain to go away. Denies intent or plan of suicide. Girlfriend states, \"I am not worried about her safety at all.\" Thus, suicide risk at this time appears low.     Instilled hope, provided empathic listening.     Assessment:     Mental Status: Aj appeared generally alert and oriented though appeared fatigue. States, \"I'm in pain all over.\" Dress was casual and appropriate to the weather and " occasion. Grooming and hygiene were good. Eye contact was poor. Speech was of diminished volume and rate. Paucity of speech noted. Speech was clear, coherent, and relevant. Mood was depressed with flat affect. Thought processes were relevant, logical and goal-directed. Thought content was WNL with no evidence of psychotic or paranoid features. Endorsed suicidal ideation. Denied intent or plan. Denied No evidence of SI/HI or self-harm, intent, or plans. Memory appeared grossly intact. Insight and judgment appeared adequate for safety and patient exhibited good impulse control during the appointment.     No flowsheet data found.    No flowsheet data found.    Diagnostic Considerations:      A complete diagnostic assessment was not performed at today's visit.     Plan:      Patient appears appropriate for every other week therapy at [facility], at least until further assessment demonstrates need for higher level of care.     Patient agreeable to seeing me in clinic on 2/6. Assisted with scheduling.     Can consider one-time consult with Dr. Michele or psychiatry team if patient requests in future or I can assist patient in referral to outside psychiatry provider.

## 2019-01-24 NOTE — PATIENT INSTRUCTIONS
Here is the plan from today's visit    1. Irregular menstrual cycle    - Partial thromboplastin time  - INR ([facility])  - Platelet function closure with reflex    2. Diplopia    - OPTHALMOLOGY ADULT REFERRAL - INTERNAL    3. Fatigue, unspecified type    - CBC with Diff Plt ([facility])  - Basic Metabolic Panel ([facility])  - Vitamin D Level    4. Muscle cramps    - CBC with Diff Plt ([facility])  - Basic Metabolic Panel ([facility])  - Vitamin D Level    5. Depression, unspecified depression type    - BEHAVIORAL HEALTH REFERRAL ([facility] interal and external)    x  Please call or return to clinic if your symptoms don't go away.    Follow up plan  Please make a clinic appointment for follow up with me (ALVIN RANKIN) in 1-2  weeks for follow up.    Thank you for coming to [facility] today.  Lab Testing:  **If you had lab testing today and your results are reassuring or normal they will be mailed to you or sent through [employer] within 7 days.   **If the lab tests need quick action we will call you with the results.  The phone number we will call with results is # [phone] (home) . If this is not the best number please call our clinic and change the number.  Medication Refills:  If you need any refills please call your pharmacy and they will contact us.   If you need to  your refill at a new pharmacy, please contact the new pharmacy directly. The new pharmacy will help you get your medications transferred faster.   Scheduling:  If you have any concerns about today's visit or wish to schedule another appointment please call our office during normal business hours [phone] (8-5:00 M-F)  If a referral was made to a [facility] and you don't get a call from central scheduling please call [phone].  If a Mammogram was ordered for you at [facility] call [phone] to schedule or change your appointment.  If you had an XRay/CT/Ultrasound/MRI ordered the number is  [phone] to schedule or change your radiology appointment.   Medical Concerns:  If you have urgent medical concerns please call [phone] at any time of the day.    Sabina Hogan, DO

## 2019-01-25 ENCOUNTER — TELEPHONE (OUTPATIENT)
Dept: PSYCHOLOGY | Facility: CLINIC | Age: 19
End: 2019-01-25

## 2019-01-25 LAB — DEPRECATED CALCIDIOL+CALCIFEROL SERPL-MC: 18 UG/L (ref 20–75)

## 2019-01-25 NOTE — TELEPHONE ENCOUNTER
Scheduled with Dr. Martinez on 2/6    Mental Health Referral:  Please schedule with Dr. Martinez      If you are unable to reach the patient after two phone attempts, please send a letter and close the encounter.      Thank you!

## 2019-01-25 NOTE — PROGRESS NOTES
Preceptor Attestation:  I have reviewed and agree with the behavioral health fellow's documentation for this visit.  I did not see the patient.  Supervising Clinical Psychologist:  eMry Nassar PSYD LP

## 2019-01-26 ASSESSMENT — ENCOUNTER SYMPTOMS
UNEXPECTED WEIGHT CHANGE: 0
NAUSEA: 0
DYSURIA: 0
CONSTIPATION: 0
FLANK PAIN: 0
EYE PAIN: 1
ABDOMINAL PAIN: 0
FATIGUE: 1
NUMBNESS: 0
BACK PAIN: 1
JOINT SWELLING: 0
SPEECH DIFFICULTY: 0
WOUND: 0
LIGHT-HEADEDNESS: 0
DECREASED CONCENTRATION: 1
DIZZINESS: 0
MYALGIAS: 1
BRUISES/BLEEDS EASILY: 1
PALPITATIONS: 0
EYE REDNESS: 0
APPETITE CHANGE: 1
PHOTOPHOBIA: 0
SLEEP DISTURBANCE: 1
SHORTNESS OF BREATH: 0
DIARRHEA: 0
CHILLS: 0
TREMORS: 1
FEVER: 0
DYSPHORIC MOOD: 1

## 2019-01-26 NOTE — RESULT ENCOUNTER NOTE
Dear Aj  Here are your labs. As you can see the Vitamin D is mildly low. This could be contributing to your fatigue, and achiness. My suggested plan is to take 1000 international unit(s) of Vitamin D3 daily. You can find this over the counter, or I can write you a prescription if you'd like. The rest of your labs are all normal and reassuring.  For follow up, I'd like you to make an appointment with me for 2/6/19 sometime around when you see Dr. Martinez. Or you're welcome to make an appointment sooner if you'd like. We can go over your labs in more detail at that appointment.  Also, I forgot to mention that for the ophthalmology (eye doctor) referral, please call this number to make the appointment: [phone].  Please message me if you have any concerns.  Sincerely,  Jesus Hogan, DO

## 2019-02-01 ENCOUNTER — OFFICE VISIT (OUTPATIENT)
Dept: DERMATOLOGY | Facility: CLINIC | Age: 19
End: 2019-02-01
Payer: COMMERCIAL

## 2019-02-01 DIAGNOSIS — L70.0 ACNE VULGARIS: ICD-10-CM

## 2019-02-01 DIAGNOSIS — L70.0 ACNE VULGARIS: Primary | ICD-10-CM

## 2019-02-01 LAB
ALBUMIN SERPL-MCNC: 3.6 G/DL (ref 3.4–5)
ALP SERPL-CCNC: 77 U/L (ref 40–150)
ALT SERPL W P-5'-P-CCNC: 35 U/L (ref 0–50)
AST SERPL W P-5'-P-CCNC: 26 U/L (ref 0–45)
BILIRUB DIRECT SERPL-MCNC: <0.1 MG/DL (ref 0–0.2)
BILIRUB SERPL-MCNC: 0.3 MG/DL (ref 0.2–1.3)
CHOLEST SERPL-MCNC: 121 MG/DL
ERYTHROCYTE [DISTWIDTH] IN BLOOD BY AUTOMATED COUNT: 11.7 % (ref 10–15)
HCT VFR BLD AUTO: 37.4 % (ref 35–47)
HDLC SERPL-MCNC: 41 MG/DL
HGB BLD-MCNC: 11.8 G/DL (ref 11.7–15.7)
LDLC SERPL CALC-MCNC: 65 MG/DL
MCH RBC QN AUTO: 28.8 PG (ref 26.5–33)
MCHC RBC AUTO-ENTMCNC: 31.6 G/DL (ref 31.5–36.5)
MCV RBC AUTO: 91 FL (ref 78–100)
NONHDLC SERPL-MCNC: 81 MG/DL
PLATELET # BLD AUTO: 319 10E9/L (ref 150–450)
PROT SERPL-MCNC: 8 G/DL (ref 6.8–8.8)
RBC # BLD AUTO: 4.1 10E12/L (ref 3.8–5.2)
TRIGL SERPL-MCNC: 78 MG/DL
WBC # BLD AUTO: 7.2 10E9/L (ref 4–11)

## 2019-02-01 RX ORDER — MINOCYCLINE HYDROCHLORIDE 50 MG/1
100 CAPSULE ORAL 2 TIMES DAILY
Qty: 120 CAPSULE | Refills: 0 | Status: SHIPPED | OUTPATIENT
Start: 2019-02-01 | End: 2019-03-05

## 2019-02-01 ASSESSMENT — PAIN SCALES - GENERAL: PAINLEVEL: NO PAIN (0)

## 2019-02-01 NOTE — PATIENT INSTRUCTIONS
For your acne:  Start using benzoyl peroxide wash once daily  Begin taking minocycline 100 mg once daily  We will plan to start isotretinoin at next visit, you will need to discuss with your primary care doctor regarding birth control options   We will obtain labs today in preparation of starting the isotretinoin    Start over-the-counter benzoyl peroxide 10% wash on the face, neck.  If 10% is too irritating you can use the 5%. ([employer] makes this product. It is available here at the pharmacy or at [employer]). This medication can bleach your towels and clothing.     It is found in a purple tube in the acne aisle.

## 2019-02-01 NOTE — PROGRESS NOTES
[facility] Dermatology Note      Dermatology Problem List:  1. Nodulocystic acne with extensive scarring:  - minocycline 100 mg once daily  - BPO wash  - plan to begin accutane at follow up    Encounter Date: Feb 1, 2019    CC:   Chief Complaint   Patient presents with     Derm Problem     Aj estrella for concerns of acne.           History of Present Illness:  Sheri Fuller is a 18 year old adult who presents for evaluation of acne. Pt states she has been having ongoing problems with acne for several years. She says this has primarily affected her cheeks and jawline and recently spread to her neck. She only rarely has involvement of chest or back. She says this does flare around the time of her period. She has tried numerous acne face kits and washes/cleansors in the past. Has never tried any topical retinoids. Has never tried any topical antibiotics. Prior to starting spironolactone in September she had not tried any oral medications. She started spironolactone 50 mg twice daily. She thought this actually helped to some degree. She was also started on minocycline 50 mg twice daily in December as well. She felt this did help a fair amount. She finished her month long course last week. Pt continues to be quite distressed by her acne, both by appearance and pain from the deeper nodules.   Pt otherwise feels well without any changes in general state of health. Denies any new, growing, changing, bleeding, or otherwise concerning/symptomatic skin findings. No other questions or concerns today.        Past Medical History:   Patient Active Problem List   Diagnosis     Irregular menstrual cycle     Past Medical History:   Diagnosis Date     Migraine with aura     1 per 3 months.     No past surgical history on file.    Social History:  Patient reports that  has never smoked. Aj Fuller has never used smokeless tobacco. Aj Fuller reports that Aj Fuller does not drink alcohol or use drugs.    Family  History:  No family history on file.    Medications:  Current Outpatient Medications   Medication Sig Dispense Refill     CHILDRENS SILAPAP 160 MG/5ML liquid TK 20.3ML PO Q 6 H PRF FEVER/PAIN  0     ibuprofen (ADVIL/MOTRIN) 600 MG tablet TK 1 T PO Q 6 H PRF MODERATE PAIN  0        No Known Allergies      Review of Systems:  -Skin/Heme New Pt: The patient denies frequent sun exposure. The patient denies excessive scarring or problems healing except as per HPI. The patient denies excessive bleeding.  -Constitutional: Otherwise feeling well today, in usual state of health.  -HEENT: Patient denies nonhealing oral sores.  -Skin: As above in HPI. No additional skin concerns.    Physical exam:  Vitals: There were no vitals taken for this visit.  GEN: This is a well developed, well-nourished female in no acute distress, in a pleasant mood.    SKIN: Acne exam, which includes the face, neck, upper central chest, and upper central back was performed.  -There are superifical acneiform papules with intermixed open and closed comedones and deeper nodulocystic acne bumps most prominent on the cheeks, jaw line, and anterior neck, with fewer lesions on FH, chin, upper chest, back and significant amount of pink/red acneiform scarring is noted on the FH, cheeks, chin, jaw line, anterior neck, chest, upper back.   -No other lesions of concern on areas examined.     Impression/Plan:  1. Acne vulgaris, mod-severe nodulocystic with extensive scarring   Discussed with patient various treatment options, including topical and oral medications. Due to severity, feel it would be best to start isotretinoin. Discussed risks/benefits. Accutane was discussed fully with the patient. It is a very effective drug to treat acne vulgaris but has many significant side effects. Chief among these are teratogensis, hepatic injury, dyslipidemia and severe drying of the mucous membranes. All of these issues have been discussed in details. Monthly blood tests  to monitor lipids and liver functions will be necessary. Expect painful dryness and/or fissuring around the lips, eyes, and other moist areas of the body. Balms may be protective. Contact lens may be too painful to wear temporarily while on this drug. Episodes of significant depression have been reported, including suicidal ideation and attempts in rare cases. It may also cause pseudotumor cerebri and hyperostosis. The patient will report any such changes in mood, depressive symptoms or suicidal thoughts, headaches, joint or bone pains.     Female patients MUST use two simultaneous methods of family planning. Accutane is Category X for pregnancy, meaning it will cause fetal teratogenic malformations, and pregnancy MUST be avoided while on this drug.    As patient will need two forms of BC (currently not on BC and no IUD) unable to start at this time. Will obtain labs today and have patient discuss with PCP likely placement of IUD in preparation of starting accutane in one month. In the interim, will have pt begin below plan.     Pt to discuss with PCP birth control options    BPO wash daily    Minocycline 100 mg once daily    CBC, LFTs, lipid panel today    Anticipate beginning accutane in 1 month        Thank you for this consultation.     CC Sabina Hogan, DO  [address], [address]  [place] [address] on close of this encounter.  Follow-up in 1 months, earlier for new or changing lesions.       Dr. Field staffed the patient.    Staff Involved:  Resident(Preet Mendoza)/Staff    I was present with the medical student who participated in the service and in the documentation of the note.  I have verified the history and personally performed the physical exam and medical decision making.  I agree with the assessment and plan of care as documented in the note.    Holley Field MD  Dermatology Staff

## 2019-02-01 NOTE — LETTER
2/1/2019       RE: Sheri Fuller  [address]  [facility] [address]     Dear Colleague,    Thank you for referring your patient, Sheri Fuller, to the [facility] DERMATOLOGY at [facility]. Please see a copy of my visit note below.    [facility] Dermatology Note      Dermatology Problem List:  1. Nodulocystic acne with extensive scarring:  - minocycline 100 mg once daily  - BPO wash  - plan to begin accutane at follow up    Encounter Date: Feb 1, 2019    CC:   Chief Complaint   Patient presents with     Derm Problem     Rocha is for concerns of acne.           History of Present Illness:  Sheri Fuller is a 18 year old adult who presents for evaluation of acne. Pt states she has been having ongoing problems with acne for several years. She says this has primarily affected her cheeks and jawline and recently spread to her neck. She only rarely has involvement of chest or back. She says this does flare around the time of her period. She has tried numerous acne face kits and washes/cleansors in the past. Has never tried any topical retinoids. Has never tried any topical antibiotics. Prior to starting spironolactone in September she had not tried any oral medications. She started spironolactone 50 mg twice daily. She thought this actually helped to some degree. She was also started on minocycline 50 mg twice daily in December as well. She felt this did help a fair amount. She finished her month long course last week. Pt continues to be quite distressed by her acne, both by appearance and pain from the deeper nodules.   Pt otherwise feels well without any changes in general state of health. Denies any new, growing, changing, bleeding, or otherwise concerning/symptomatic skin findings. No other questions or concerns today.        Past Medical History:   Patient Active Problem List   Diagnosis     Irregular menstrual cycle     Past Medical History:   Diagnosis  Date     Migraine with aura     1 per 3 months.     No past surgical history on file.    Social History:  Patient reports that  has never smoked. Aj Fuller has never used smokeless tobacco. Aj Fuller reports that Aj Fuller does not drink alcohol or use drugs.    Family History:  No family history on file.    Medications:  Current Outpatient Medications   Medication Sig Dispense Refill     CHILDRENS SILAPAP 160 MG/5ML liquid TK 20.3ML PO Q 6 H PRF FEVER/PAIN  0     ibuprofen (ADVIL/MOTRIN) 600 MG tablet TK 1 T PO Q 6 H PRF MODERATE PAIN  0        No Known Allergies      Review of Systems:  -Skin/Heme New Pt: The patient denies frequent sun exposure. The patient denies excessive scarring or problems healing except as per HPI. The patient denies excessive bleeding.  -Constitutional: Otherwise feeling well today, in usual state of health.  -HEENT: Patient denies nonhealing oral sores.  -Skin: As above in HPI. No additional skin concerns.    Physical exam:  Vitals: There were no vitals taken for this visit.  GEN: This is a well developed, well-nourished female in no acute distress, in a pleasant mood.    SKIN: Acne exam, which includes the face, neck, upper central chest, and upper central back was performed.  -There are superifical acneiform papules with intermixed open and closed comedones and deeper nodulocystic acne bumps most prominent on the cheeks, jaw line, and anterior neck, with fewer lesions on FH, chin, upper chest, back and significant amount of pink/red acneiform scarring is noted on the FH, cheeks, chin, jaw line, anterior neck, chest, upper back.   -No other lesions of concern on areas examined.     Impression/Plan:  1. Acne vulgaris, mod-severe nodulocystic with extensive scarring   Discussed with patient various treatment options, including topical and oral medications. Due to severity, feel it would be best to start isotretinoin. Discussed risks/benefits. Accutane was discussed fully with the  patient. It is a very effective drug to treat acne vulgaris but has many significant side effects. Chief among these are teratogensis, hepatic injury, dyslipidemia and severe drying of the mucous membranes. All of these issues have been discussed in details. Monthly blood tests to monitor lipids and liver functions will be necessary. Expect painful dryness and/or fissuring around the lips, eyes, and other moist areas of the body. Balms may be protective. Contact lens may be too painful to wear temporarily while on this drug. Episodes of significant depression have been reported, including suicidal ideation and attempts in rare cases. It may also cause pseudotumor cerebri and hyperostosis. The patient will report any such changes in mood, depressive symptoms or suicidal thoughts, headaches, joint or bone pains.     Female patients MUST use two simultaneous methods of family planning. Accutane is Category X for pregnancy, meaning it will cause fetal teratogenic malformations, and pregnancy MUST be avoided while on this drug.    As patient will need two forms of BC (currently not on BC and no IUD) unable to start at this time. Will obtain labs today and have patient discuss with PCP likely placement of IUD in preparation of starting accutane in one month. In the interim, will have pt begin below plan.     Pt to discuss with PCP birth control options    BPO wash daily    Minocycline 100 mg once daily    CBC, LFTs, lipid panel today    Anticipate beginning accutane in 1 month        Thank you for this consultation.     CC Sabina Hogan, DO  [address], [address]  [place] [address] on close of this encounter.  Follow-up in 1 months, earlier for new or changing lesions.       Dr. Field staffed the patient.    Staff Involved:  Resident(Preet Mendoza)/Staff    I was present with the medical student who participated in the service and in the documentation of the note.  I have verified the  history and personally performed the physical exam and medical decision making.  I agree with the assessment and plan of care as documented in the note.    Holley Field MD  Dermatology Staff

## 2019-02-03 PROBLEM — L70.0 NODULOCYSTIC ACNE: Status: ACTIVE | Noted: 2019-02-03

## 2019-02-03 PROBLEM — R53.83 OTHER FATIGUE: Status: ACTIVE | Noted: 2019-02-03

## 2019-02-03 PROBLEM — E55.9 VITAMIN D DEFICIENCY: Status: ACTIVE | Noted: 2019-02-03

## 2019-02-04 ENCOUNTER — OFFICE VISIT (OUTPATIENT)
Dept: OPHTHALMOLOGY | Facility: CLINIC | Age: 19
End: 2019-02-04
Attending: OPHTHALMOLOGY
Payer: COMMERCIAL

## 2019-02-04 DIAGNOSIS — H53.2 DOUBLE VISION: Primary | ICD-10-CM

## 2019-02-04 DIAGNOSIS — H52.533 ACCOMMODATION SPASM, BILATERAL: Primary | ICD-10-CM

## 2019-02-04 DIAGNOSIS — H53.2 DIPLOPIA: ICD-10-CM

## 2019-02-04 DIAGNOSIS — H52.03 HYPERMETROPIA OF BOTH EYES: ICD-10-CM

## 2019-02-04 DIAGNOSIS — Z51.81 MEDICATION MONITORING ENCOUNTER: Primary | ICD-10-CM

## 2019-02-04 PROCEDURE — G0463 HOSPITAL OUTPT CLINIC VISIT: HCPCS | Mod: ZF

## 2019-02-04 PROCEDURE — 92015 DETERMINE REFRACTIVE STATE: CPT | Mod: ZF

## 2019-02-04 ASSESSMENT — EXTERNAL EXAM - RIGHT EYE: OD_EXAM: NORMAL

## 2019-02-04 ASSESSMENT — REFRACTION_MANIFEST
OS_AXIS: 180
OS_SPHERE: +3.75
OD_AXIS: 110
OD_CYLINDER: +0.50
OS_CYLINDER: +1.25
OD_SPHERE: +2.50

## 2019-02-04 ASSESSMENT — VISUAL ACUITY
OD_SC: 20/20
METHOD: SNELLEN - LINEAR
OS_SC: 20/20

## 2019-02-04 ASSESSMENT — TONOMETRY
IOP_METHOD: ICARE
OS_IOP_MMHG: 12
OD_IOP_MMHG: 13

## 2019-02-04 ASSESSMENT — REFRACTION
OD_CYLINDER: +0.50
OD_SPHERE: +2.50
OD_AXIS: 110
OS_CYLINDER: +1.25
OS_AXIS: 180
OS_SPHERE: +3.75

## 2019-02-04 ASSESSMENT — CONF VISUAL FIELD
OS_NORMAL: 1
OD_NORMAL: 1

## 2019-02-04 ASSESSMENT — SLIT LAMP EXAM - LIDS
COMMENTS: NORMAL
COMMENTS: NORMAL

## 2019-02-04 ASSESSMENT — CUP TO DISC RATIO
OS_RATIO: 0.3
OD_RATIO: 0.3

## 2019-02-04 ASSESSMENT — EXTERNAL EXAM - LEFT EYE: OS_EXAM: NORMAL

## 2019-02-04 NOTE — PROGRESS NOTES
"HPI  Sheri Fuller is a 18 year old adult who presents for evaluation of blurry and double vision.  She has constant double vision which she feels like is getting worse.  This is especially bad when she is sleepy.  She says she has been having \"multiple sclerosis\" symptoms but does not carry a diagnosis.  Recently told she has a vitamin deficiency.  Eye misalignment has been present for a long time.  She has binocular horizontal double vision.  Sometimes when she is reading and then tries to look in the distance her vision is very blurry and takes time to get better.  She is currently pretty stressed. She has a history of migraine with aura of which she has had off and on but seem to be worse now.  Feels like colors may be off.    Past Medical History:   Diagnosis Date     Migraine with aura     1 per 3 months.   Past Ocular History: No history of eye surgery, trauma, or infections.  Family history: No glaucoma, macular degeneration, or retinal detachment.    Eye Medications:  None      Assessment & Plan     Sheri Fuller is a 18 year old adult with the following diagnoses:   1. Accommodation spasm, bilateral    2. Diplopia    3. Hypermetropia of both eyes       - comitant ET worse at near, history suggestive of spastic component  - likely under corrected based on patient reported current glasses prescription (old pair)  - cycloplegic refraction performed today  - The results of the refraction was discussed with the patient and a new glasses prescription was provided.     - Follow-up 1-2 months to assess for residual strabismus, consider prism vs strabismus surgery if still double, sooner if needed    -----------------------------------------------------------------------------------    Patient disposition:   Return in about 1 month (around 3/4/2019) for Follow Up., sooner as needed.    Chu Patel M.D.  PGY-3, Ophthalmology     Teaching statement:  Complete documentation of historical and exam elements from " today's encounter can be found in the full encounter summary report (not reduplicated in this progress note). I personally obtained the chief complaint(s) and history of present illness.  I confirmed and edited as necessary the review of systems, past medical/surgical history, family history, social history, and examination findings as documented by others; and I examined the patient myself. I personally reviewed the relevant tests, images, and reports as documented above.     I formulated and edited as necessary the assessment and plan and discussed the findings and management plan with the patient and family.    Denisse Estrella MD  Comprehensive Ophthalmology & Ocular Pathology  Department of Ophthalmology and Visual Neurosciences  [email or web address]  Pager [phone]

## 2019-02-04 NOTE — NURSING NOTE
Chief Complaints and History of Present Illnesses   Patient presents with     Diplopia Evaluation     Chief Complaint(s) and History of Present Illness(es)     Sheri Fuller is a 18 year old adult who presents today for  1. Diplopia  Onset in childhood. Started wearing glasses at [age] years of age.   Had intermittent crossing of the left eye that resolved at 8 years of age.   Now complaining of intermittent horizontal diplopia. Worse when tired, or at the end of the day. Worse at distance.   No history of brain imaging done in the past.   No history of amblyopia therapy  Janeth FREIRE 2:00 PM February 4, 2019

## 2019-02-06 ENCOUNTER — OFFICE VISIT (OUTPATIENT)
Dept: FAMILY MEDICINE | Facility: CLINIC | Age: 19
End: 2019-02-06
Payer: COMMERCIAL

## 2019-02-06 ENCOUNTER — OFFICE VISIT (OUTPATIENT)
Dept: PSYCHOLOGY | Facility: CLINIC | Age: 19
End: 2019-02-06
Payer: COMMERCIAL

## 2019-02-06 VITALS
OXYGEN SATURATION: 99 % | BODY MASS INDEX: 21.93 KG/M2 | HEART RATE: 92 BPM | SYSTOLIC BLOOD PRESSURE: 124 MMHG | DIASTOLIC BLOOD PRESSURE: 73 MMHG | TEMPERATURE: 98.1 F | WEIGHT: 140 LBS

## 2019-02-06 DIAGNOSIS — F32.2 CURRENT SEVERE EPISODE OF MAJOR DEPRESSIVE DISORDER WITHOUT PSYCHOTIC FEATURES, UNSPECIFIED WHETHER RECURRENT (H): ICD-10-CM

## 2019-02-06 DIAGNOSIS — N92.6 IRREGULAR MENSTRUAL CYCLE: Chronic | ICD-10-CM

## 2019-02-06 DIAGNOSIS — L70.0 NODULOCYSTIC ACNE: ICD-10-CM

## 2019-02-06 DIAGNOSIS — E55.9 VITAMIN D DEFICIENCY: ICD-10-CM

## 2019-02-06 DIAGNOSIS — R29.898 LEFT LEG WEAKNESS: ICD-10-CM

## 2019-02-06 DIAGNOSIS — R25.1 EPISODE OF SHAKING: ICD-10-CM

## 2019-02-06 DIAGNOSIS — F32.A DEPRESSION, UNSPECIFIED DEPRESSION TYPE: ICD-10-CM

## 2019-02-06 DIAGNOSIS — R53.83 OTHER FATIGUE: Primary | ICD-10-CM

## 2019-02-06 LAB
CRP SERPL-MCNC: <2.9 MG/L (ref 0–8)
ERYTHROCYTE [SEDIMENTATION RATE] IN BLOOD: 12 MM/HR (ref 0–20)

## 2019-02-06 ASSESSMENT — ANXIETY QUESTIONNAIRES
5. BEING SO RESTLESS THAT IT IS HARD TO SIT STILL: NEARLY EVERY DAY
3. WORRYING TOO MUCH ABOUT DIFFERENT THINGS: NEARLY EVERY DAY
GAD7 TOTAL SCORE: 21
4. TROUBLE RELAXING: NEARLY EVERY DAY
IF YOU CHECKED OFF ANY PROBLEMS ON THIS QUESTIONNAIRE, HOW DIFFICULT HAVE THESE PROBLEMS MADE IT FOR YOU TO DO YOUR WORK, TAKE CARE OF THINGS AT HOME, OR GET ALONG WITH OTHER PEOPLE: VERY DIFFICULT
6. BECOMING EASILY ANNOYED OR IRRITABLE: NEARLY EVERY DAY
2. NOT BEING ABLE TO STOP OR CONTROL WORRYING: NEARLY EVERY DAY
1. FEELING NERVOUS, ANXIOUS, OR ON EDGE: NEARLY EVERY DAY
7. FEELING AFRAID AS IF SOMETHING AWFUL MIGHT HAPPEN: NEARLY EVERY DAY

## 2019-02-06 ASSESSMENT — PATIENT HEALTH QUESTIONNAIRE - PHQ9: SUM OF ALL RESPONSES TO PHQ QUESTIONS 1-9: 23
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
## 2019-02-06 NOTE — PATIENT INSTRUCTIONS
Here is the plan from today's visit    1. Vitamin D deficiency  - take 1000 international unit(s) daily with multivitamin    2. Nodulocystic acne  - decide when to take accutane  - depo for birth control    3. Other fatigue    - Magnesium; Future  - magnesium oxide (MAG-OX) 400 (241.3 Mg) MG tablet; Take 1 tablet by mouth daily  Dispense: 90 tablet; Refill: 3  - vitamin D3 (CHOLECALCIFEROL) 1000 units (25 mcg) tablet; Take 1 tablet (1,000 Units) by mouth daily  Dispense: 30 tablet; Refill: 11  - HIV Antigen Antibody Combo  - Treponema Abs w Reflex to RPR and Titer  - Anti Nuclear Criss IgG by IFA with Reflex  - Erythrocyte Sedimentation Rate ([facility])  - CRP inflammation  - NEUROLOGY ADULT REFERRAL - INTERNAL    4. Irregular menstrual cycle    - HCG Qualitative Urine (UPT) ([facility]); Future    5. Left leg weakness      6. Episode of shaking    - NEUROLOGY ADULT REFERRAL - INTERNAL    x  Please call or return to clinic if your symptoms don't go away.    Follow up plan  Please make a clinic appointment for follow up with me (ALVIN RANKIN) in 1  month for follow up fatigue, referrals.    Thank you for coming to [facility] today.  Lab Testing:  **If you had lab testing today and your results are reassuring or normal they will be mailed to you or sent through [employer] within 7 days.   **If the lab tests need quick action we will call you with the results.  The phone number we will call with results is # [phone] (home) . If this is not the best number please call our clinic and change the number.  Medication Refills:  If you need any refills please call your pharmacy and they will contact us.   If you need to  your refill at a new pharmacy, please contact the new pharmacy directly. The new pharmacy will help you get your medications transferred faster.   Scheduling:  If you have any concerns about today's visit or wish to schedule another appointment please call our office during normal business  hours [phone] (8-5:00 M-F)  If a referral was made to a [facility] and you don't get a call from central scheduling please call [phone].  If a Mammogram was ordered for you at [facility] call [phone] to schedule or change your appointment.  If you had an XRay/CT/Ultrasound/MRI ordered the number is [phone] to schedule or change your radiology appointment.   Medical Concerns:  If you have urgent medical concerns please call [phone] at any time of the day.    Sabina Hogan, DO

## 2019-02-06 NOTE — Clinical Note
Very open to feedback on this note - I do not want to overstep on my assessment so please let me know what you think.

## 2019-02-07 PROBLEM — F42.9 OCD (OBSESSIVE COMPULSIVE DISORDER): Status: ACTIVE | Noted: 2019-02-07

## 2019-02-07 PROBLEM — M62.81 MUSCLE WEAKNESS ON EXAMINATION: Status: ACTIVE | Noted: 2019-02-07

## 2019-02-07 PROBLEM — H52.533 SPASM OF ACCOMMODATION OF BOTH EYES: Status: ACTIVE | Noted: 2019-02-07

## 2019-02-07 PROBLEM — F43.10 PTSD (POST-TRAUMATIC STRESS DISORDER): Status: ACTIVE | Noted: 2019-02-07

## 2019-02-07 PROBLEM — F42.2 MIXED OBSESSIONAL THOUGHTS AND ACTS: Status: ACTIVE | Noted: 2019-02-07

## 2019-02-07 PROBLEM — H53.2 DIPLOPIA: Status: ACTIVE | Noted: 2019-02-07

## 2019-02-07 PROBLEM — F84.0 AUTISM: Status: ACTIVE | Noted: 2019-02-07

## 2019-02-07 PROBLEM — H52.03 HYPERMETROPIA, BILATERAL: Status: ACTIVE | Noted: 2019-02-07

## 2019-02-07 PROBLEM — R25.1 OCCASIONAL TREMORS: Status: ACTIVE | Noted: 2019-02-07

## 2019-02-07 PROBLEM — R52 DIFFUSE PAIN: Status: ACTIVE | Noted: 2019-02-07

## 2019-02-07 PROBLEM — Z65.4 HISTORY OF STRANGULATION ASSAULT: Status: ACTIVE | Noted: 2019-02-07

## 2019-02-07 LAB
ANA SER QL IF: NEGATIVE
HIV 1+2 AB+HIV1 P24 AG SERPL QL IA: NONREACTIVE
T PALLIDUM AB SER QL: NONREACTIVE

## 2019-02-07 NOTE — PROGRESS NOTES
Preceptor Attestation:   Patient seen, evaluated and discussed with the resident. I have verified the content of the note, which accurately reflects my assessment of the patient and the plan of care. Also discussed rheumatoid factor, lyme screening as well. To consider with next visit.     Supervising Physician:  Yenny Noriega MD

## 2019-02-08 ASSESSMENT — ANXIETY QUESTIONNAIRES: GAD7 TOTAL SCORE: 21

## 2019-02-14 NOTE — PROGRESS NOTES
Preceptor Attestation:  I have reviewed and agree with the behavioral health fellow's documentation for this visit.  I did not see the patient.  Supervising Clinical Psychologist:  Mery Nassar PSYD LP

## 2019-02-16 ENCOUNTER — APPOINTMENT (OUTPATIENT)
Dept: ULTRASOUND IMAGING | Facility: CLINIC | Age: 19
End: 2019-02-16
Attending: PHYSICIAN ASSISTANT
Payer: COMMERCIAL

## 2019-02-16 ENCOUNTER — HOSPITAL ENCOUNTER (EMERGENCY)
Facility: CLINIC | Age: 19
Discharge: HOME | End: 2019-02-16
Admitting: PHYSICIAN ASSISTANT
Payer: COMMERCIAL

## 2019-02-16 VITALS
WEIGHT: 140 LBS | RESPIRATION RATE: 16 BRPM | TEMPERATURE: 98.4 F | HEIGHT: 67 IN | DIASTOLIC BLOOD PRESSURE: 71 MMHG | SYSTOLIC BLOOD PRESSURE: 92 MMHG | BODY MASS INDEX: 21.97 KG/M2 | OXYGEN SATURATION: 99 %

## 2019-02-16 DIAGNOSIS — M79.605 LEFT LEG PAIN: ICD-10-CM

## 2019-02-16 PROCEDURE — 99284 EMERGENCY DEPT VISIT MOD MDM: CPT | Mod: 25

## 2019-02-16 PROCEDURE — 93971 EXTREMITY STUDY: CPT | Mod: LT

## 2019-02-16 ASSESSMENT — ENCOUNTER SYMPTOMS
SHORTNESS OF BREATH: 0
MYALGIAS: 1

## 2019-02-16 ASSESSMENT — MIFFLIN-ST. JEOR: SCORE: 1437.67

## 2019-02-16 NOTE — ED PROVIDER NOTES
"  History     Chief Complaint:  Leg Pain    HPI   Sheri Fuller is a 18 year old adult, with history of muscle weakness and diffuse pain in muscles of bilateral thighs, with the below risk factors and not currently anticoagulated, who presents with their partner for evaluation of left upper leg pain. Of note, patient reports they are going to be evaluated next week by neurology for multiple sclerosis and at baseline normally has pain in both legs as well as intermittent chest discomfort and shortness of breath. They note that for the last few days, pain has been worsening and they have been noting bruises as well as swelling to the area, though denies any trauma or injury. As this is unusual, they presented for evaluation. Here in the ED, partner reports that they were laying in bed most of the day yesterday, but noted they have been doing more walking than usual. Patient denies any new chest pain or shortness of breath.    Cardiac/PE/DVT Risk Factors:  History of hypertension - No  History of hyperlipidemia - No  History of diabetes - No  History of smoking - No  Personal history of PE/DVT - No  Family history of PE/DVT - Yes  Family history of heart complications - No  Recent travel - NO  Recent surgery - No  Other immobilizations - \"Laying in bed a lot\"  Cancer - No  Birth Control - No     Allergies:  No Known Drug Allergies     Medications:    Magnesium oxide  Minocycline  Vitamin D3    Past Medical History:    Migraine with aura  Depression  PTSD  Autism  OCD  Muscle weakness   Diffuse pain in muscles of her thighs     Past Surgical History:    History reviewed. No pertinent past surgical history.     Family History:    DVT - sister    Social History:  The patient was accompanied to the ED by her partner.  Smoking Status: No  Smokeless Tobacco: No  Alcohol Use: No  Drug Use: No   Marital Status:  Single [1]     Review of Systems   Respiratory: Negative for shortness of breath.    Cardiovascular: Negative for " "chest pain.   Musculoskeletal: Positive for myalgias (Left thigh with swelling).   Skin:        \"Bruising a lot more\"   All other systems reviewed and are negative.    Physical Exam   Vitals:  Patient Vitals for the past 24 hrs:   BP Temp Temp src Heart Rate Resp SpO2 Height Weight   02/16/19 1801 92/71 -- -- 87 16 99 % -- --   02/16/19 1706 -- 98.4  F (36.9  C) Oral -- -- -- -- --   02/16/19 1704 120/73 -- Oral 112 20 96 % 1.702 m (5' 7\") 63.5 kg (140 lb)      Physical Exam  General: Well appearing, well nourished. Mildly anxious appearing.   Skin: Small resolving bruise, <1 cm on left lateral thigh.   HEENT: Head: Normocephalic, atraumatic, no visible masses.   Eyes: Conjunctiva clear.  Cardiac: Normal rate and regular rhythm, no murmur or gallop.   Lungs: Clear to auscultation.  Abdomen: Abdomen soft, non-tender. No rebound tenderness of guarding.   Musculoskeletal: Tenderness to palpation throughout the left IT band. Full ROM of left hip and knee without difficulty.  No tenderness palpation throughout the remaining left hip, anterior thigh, knee, lower extremity.  No calf pain or swelling.  Normal sensation throughout bilateral lower extremities.  Normal dorsalis pedis and posterior tibialis pulses bilaterally.  Normal capillary refill throughout all digits of bilateral lower extremities.  Normal gait and station.   Neurologic: Oriented x 3. GCS: 15.    Emergency Department Course     Imaging:  Radiology findings were communicated with the patient and family who voiced understanding of the findings.  US Lower Extremity Venous Duplex Left:  IMPRESSION: No evidence for DVT within the left lower extremity.  Reading per radiology.     Emergency Department Course:  Nursing notes and vitals reviewed.  The patient was sent for a US Lower Extremity Venous Duplex Left while in the emergency department, results above.      5:14 PM: I performed an exam of the patient as documented above. History obtained from " patient.  6:21 PM: Updated patient/partner regarding results. Discussed plan of care and patient will be discharged home.     I discussed the treatment plan with the patient. They expressed understanding of this plan and consented to discharge. They will be discharged home with instructions for care and follow up. In addition, the patient will return to the emergency department if their symptoms persist, worsen, if new symptoms arise or if there is any concern.  All questions were answered prior to discharge.    Impression & Plan      Medical Decision Making:  Sheri Fuller is a 18 year old adult who presents for evaluation of leg swelling. A broad differential was considered including Baker's cyst rupture, Baker's cyst, hematoma, compartment syndrome, muscle rupture, contusion, strain/sprain, fracture, dislocation DVT, superficial thrombophlebitis, myositis, cellulitis/abscess, amongst.  Ultrasound completed here is negative.  There are no signs of compartment syndrome or other worrisome etiologies at this time.  I feel the patient can be discharged to have outpatient management.  I suspect that the patient has an IT band strain due to her increase in walking lately.  This may be exacerbated by walking on snowy conditions.  Conservative and symptomatic measures discussed.  They will elevate the leg, do gentle stretching and massage, use Tylenol/ibuprofen, ice, avoid strenuous activity.  They will follow-up with their primary care provider within the next few days if not improving.  If symptoms change or worsen, not improving within 7-10 days, they will re-present for repeat ultrasound.  [facility] orthopedic information was also provided to her if she is not feeling any improvement next week.  She was given handouts on IT band stretching.  They will also return to the ED for any changing or worsening symptoms, chest pain, shortness of breath, leg discoloration, worsening pain, new concerns.  They were also  recommended to follow up with orthopedic as well. All questions answered prior to discharge.  Patient in agreement with the treatment plan as stated above.    Diagnosis:    ICD-10-CM    1. Left leg pain M79.605         Disposition:   Discharged.    This was created at least in part with a voice recognition software. Mistakes/typos may be present.      Scribe Disclosure:  I, Melanie Sanderson, am serving as a scribe at 5:19 PM on 2/16/2019 to document services personally performed by Sammi Heredia PA-C, based on my observations and the provider's statements to me.  2/16/2019    EMERGENCY DEPARTMENT       Sammi Heredia PA  02/16/19 5866

## 2019-02-16 NOTE — ED AVS SNAPSHOT
Emergency Department  [address] [place] [address]  Phone:  [phone]  Fax:  [phone]                                    Sheri Fuller   MRN: [id number]    Department:   Emergency Department   Date of Visit:  2/16/2019           After Visit Summary Signature Page    I have received my discharge instructions, and my questions have been answered. I have discussed any challenges I see with this plan with the nurse or doctor.    ..........................................................................................................................................  Patient/Patient Representative Signature      ..........................................................................................................................................  Patient Representative Print Name and Relationship to Patient    ..................................................               ................................................  Date                                   Time    ..........................................................................................................................................  Reviewed by Signature/Title    ...................................................              ..............................................  Date                                               Time          22EPIC Rev 08/18

## 2019-02-17 NOTE — DISCHARGE INSTRUCTIONS
Gentle massage, Tylenol, ibuprofen at home to help with pain.  Follow-up with [facility] orthopedics in 1 week if not improving.  Return to the ED for any change or worsening symptoms, new concerns. Some IT band stretch are below.

## 2019-02-20 ENCOUNTER — PRE VISIT (OUTPATIENT)
Dept: NEUROLOGY | Facility: CLINIC | Age: 19
End: 2019-02-20

## 2019-02-27 ENCOUNTER — OFFICE VISIT (OUTPATIENT)
Dept: NEUROLOGY | Facility: CLINIC | Age: 19
End: 2019-02-27
Attending: FAMILY MEDICINE
Payer: COMMERCIAL

## 2019-02-27 VITALS
DIASTOLIC BLOOD PRESSURE: 72 MMHG | WEIGHT: 141.7 LBS | SYSTOLIC BLOOD PRESSURE: 111 MMHG | HEIGHT: 67 IN | BODY MASS INDEX: 22.24 KG/M2 | HEART RATE: 105 BPM

## 2019-02-27 DIAGNOSIS — H54.62 VISION LOSS OF LEFT EYE: ICD-10-CM

## 2019-02-27 DIAGNOSIS — R42 DIZZINESS: Primary | ICD-10-CM

## 2019-02-27 PROCEDURE — G0463 HOSPITAL OUTPT CLINIC VISIT: HCPCS | Mod: ZF

## 2019-02-27 RX ORDER — CLONAZEPAM 0.5 MG/1
0.5 TABLET, ORALLY DISINTEGRATING ORAL ONCE
Qty: 1 TABLET | Refills: 0 | Status: ON HOLD | OUTPATIENT
Start: 2019-02-27 | End: 2019-06-04

## 2019-02-27 ASSESSMENT — PAIN SCALES - GENERAL: PAINLEVEL: NO PAIN (0)

## 2019-02-27 ASSESSMENT — MIFFLIN-ST. JEOR: SCORE: 1445.38

## 2019-02-27 NOTE — PROGRESS NOTES
"THE [facility] MULTIPLE SCLEROSIS CLINIC  NEW PATIENT EVALUATION/CONSULTATION    Referral source:   Samira  [address] [facility] / [facility] [address]            HISTORY OF ILLNESS:    An opinion on this 18 year old right handed genetic female  was requested by Dr. Sabina Hogan for  possible MS. The patient was accompanied by partner.       18 year old with PMH of depression, irregular menstrual periods, scoliosis, PTSD and autism who was in her USOH until November when she fell and hit her tailbone, she and her partner report balance issues that month, she describes it as a sensation of being drunk and lightheaded. She has had double vision since childhood but this got worse, it is usually a shadow but this time she saw almost two objects for about a month in January, that would fluctuate but never back to her previous baseline. In January she had pain in the L eye with blurriness and red color desaturation which resolved within a week. She has chronic back pain and is on CBD oil for pain. She also reports tremors in the LE L>R  in the past 2 months, mostly lower calf and thighs off and on worse at night, skin sensitivity and a sensation of not being able to explain her ribs when she breathes (like the \"MS hug)      Current Symptoms:  1. Low back pain  2. Nausea x 2 months off and on in the mroning  3. Night tremors  4. Fatigue        PAST HISTORY:  Past Medical History:   Diagnosis Date     Migraine with aura     1 per 3 months.       History reviewed. No pertinent surgical history.           Current Outpatient Prescriptions:  Current Outpatient Medications   Medication     CHILDRENS SILAPAP 160 MG/5ML liquid     ibuprofen (ADVIL/MOTRIN) 600 MG tablet     magnesium oxide (MAG-OX) 400 (241.3 Mg) MG tablet     minocycline (MINOCIN/DYNACIN) 50 MG capsule     vitamin D3 (CHOLECALCIFEROL) 1000 units (25 mcg) tablet     No current facility-administered medications for this visit. "           ALLERGIES     No Known Allergies      Social History    Social History     Socioeconomic History     Marital status: Single     Spouse name: Not on file     Number of children: Not on file     Years of education: Not on file     Highest education level: Not on file   Occupational History     Not on file   Social Needs     Financial resource strain: Not on file     Food insecurity:     Worry: Not on file     Inability: Not on file     Transportation needs:     Medical: Not on file     Non-medical: Not on file   Tobacco Use     Smoking status: Never Smoker     Smokeless tobacco: Never Used   Substance and Sexual Activity     Alcohol use: No     Drug use: No     Sexual activity: Not on file   Lifestyle     Physical activity:     Days per week: Not on file     Minutes per session: Not on file     Stress: Not on file   Relationships     Social connections:     Talks on phone: Not on file     Gets together: Not on file     Attends [employer]ism service: Not on file     Active member of club or organization: Not on file     Attends meetings of clubs or organizations: Not on file     Relationship status: Not on file     Intimate partner violence:     Fear of current or ex partner: Not on file     Emotionally abused: Not on file     Physically abused: Not on file     Forced sexual activity: Not on file   Other Topics Concern     Parent/sibling w/ CABG, MI or angioplasty before 65F 55M? Not Asked   Social History Narrative     Not on file         FAMILY HISTORY     History reviewed. No pertinent family history.      REVIEW OF SYSTEMS:    Comprehensive review of systems otherwise was negative, including constitutional, head and neck, cardiovascular, pulmonary, gastrointestinal, endocrine, urologic, reproductive, rheumatic, hematologic, immunologic, dermatologic, and psychiatric.    Nutritional concerns: None  Driving issues: None   Safety concerns regarding living situations and safety at home: None  Risk of falls:  None  Pain: None    PHYSICAL EXAM:    Hair, skin, nails, and joints were normal. Neck was supple without Lhermitte's phenomenon.  There was no percussion tenderness over the spine.     The patient was alert and oriented to person, place, and time with normal language, attention and concentration, recent and remote memory, praxis, and intellectual function. Affect was normal. The patient did not appear depressed.    Visual acuity:  OD 20/20  OS 20/20    Correction: without    Visual fields were full to confrontation.   Pupils were 3 mm and briskly reactive OU without a relative afferent pupillary defect.  Funduscopic examination was normal without disc edema, erythema, or atrophy.  Extraocular movements: dysconjugate gaze  Facial sensation is normal. Normal strength of the muscles of mastication:   Muscles of facial expression were normal  Hearing was normal. Gag reflex and palatal movements were normal. Sternocleidomastoid and trapezius power were normal. Tongue movements were normal. There was no dysarthria.    Motor Examination:   There was no pronator drift.       Motor    Upper      Right Left   Shoulder Abduction 5 5   Elbow Flexion 5 5   Elbow Extension 5 5   Wrist Extension 5 5   Digit Extension 5 5   Digit Flexion 5 5   APB 5 5   Tone 0 0   Lower       Right Left   Hip Flexion 5 5   Knee Extension 5 5   Knee Flexion 5 5   Foot Dorsiflexion 5 5   Foot Plantar Flexion 5 5   EH 5 5   Toe Flexion 5 5   Tone 0 0               Reflexes:     Reflexes       Right  Left   Biceps 1  1   Triceps 1  1   Brachioradialis 1  1   Patellar  1  1   Achilles 1  1   Babinski down  down         Coordination:     Right Left   RRM Normal Normal   WALDO Normal Normal   FTN Normal Normal   RRM Normal Normal   HKS Normal Normal         Sensory examination:    Light touch:  Intact in all extremities      Coordination and Gait        Gait Normal   Right Left   Romberg abnormal  Heel Normal Normal   Tandem Normal slow  Toe Normal Normal            REVIEW OF IMAGING STUDIES:    I personally reviewed the following images:    Not available    ASSESSMENT:    18 year old female with complicated PMH as above complaining of intermittent symptoms of dizziness, tremors, fatigue, worsening diplopia and one episode of vision loss lasting one week. Symptoms are somewhat concerning for MS but neurological exam is grossly normal except for diplopia which she had since childhood and abnormal Romberg.    PLAN:    We will obtain an MRI of the brain w/wo contrast to evaluate for lesions supportive of the diagnosis of MS.    Finally I will follow the patient up in 2 week(s) as long as the patient is doing well. I instructed the patient to call or mychart my office with any concerns or questions.    I spent 60 minutes in this visit, with >50% direct patient time spent counseling about prognosis, treatment options, and coordination of care.     My recommendations will be communicated back to the patient's physician(s) by mail.  Follow-up is expected to be with me.      Kelly Garcia MD  Chief, Multiple Sclerosis Division  Department of Neurology  [facility]      (Chart documentation was completed in part with Dragon voice-recognition software. Even though reviewed, some grammatical, spelling, and word errors may remain.)

## 2019-02-27 NOTE — LETTER
"2/27/2019       RE: Sheri Fuller  [address]  [facility] [address]     Dear Colleague,    Thank you for referring your patient, Sheri Fuller, to the [facility] MULTIPLE SCLEROSIS at [facility]. Please see a copy of my visit note below.    THE [facility] MULTIPLE SCLEROSIS CLINIC  NEW PATIENT EVALUATION/CONSULTATION    Referral source:   Samira  [address] [place] [address], [address] / [facility] [address]            HISTORY OF ILLNESS:    An opinion on this 18 year old right handed genetic female  was requested by Dr. Sabina Hogan for  possible MS. The patient was accompanied by partner.       18 year old with PMH of depression, irregular menstrual periods, scoliosis, PTSD and autism who was in her USOH until November when she fell and hit her tailbone, she and her partner report balance issues that month, she describes it as a sensation of being drunk and lightheaded. She has had double vision since childhood but this got worse, it is usually a shadow but this time she saw almost two objects for about a month in January, that would fluctuate but never back to her previous baseline. In January she had pain in the L eye with blurriness and red color desaturation which resolved within a week. She has chronic back pain and is on CBD oil for pain. She also reports tremors in the LE L>R  in the past 2 months, mostly lower calf and thighs off and on worse at night, skin sensitivity and a sensation of not being able to explain her ribs when she breathes (like the \"MS hug)      Current Symptoms:  1. Low back pain  2. Nausea x 2 months off and on in the mroning  3. Night tremors  4. Fatigue        PAST HISTORY:  Past Medical History:   Diagnosis Date     Migraine with aura     1 per 3 months.       History reviewed. No pertinent surgical history.           Current Outpatient Prescriptions:  Current Outpatient Medications   Medication     CHILDRENS SILAPAP " 160 MG/5ML liquid     ibuprofen (ADVIL/MOTRIN) 600 MG tablet     magnesium oxide (MAG-OX) 400 (241.3 Mg) MG tablet     minocycline (MINOCIN/DYNACIN) 50 MG capsule     vitamin D3 (CHOLECALCIFEROL) 1000 units (25 mcg) tablet     No current facility-administered medications for this visit.           ALLERGIES     No Known Allergies      Social History    Social History     Socioeconomic History     Marital status: Single     Spouse name: Not on file     Number of children: Not on file     Years of education: Not on file     Highest education level: Not on file   Occupational History     Not on file   Social Needs     Financial resource strain: Not on file     Food insecurity:     Worry: Not on file     Inability: Not on file     Transportation needs:     Medical: Not on file     Non-medical: Not on file   Tobacco Use     Smoking status: Never Smoker     Smokeless tobacco: Never Used   Substance and Sexual Activity     Alcohol use: No     Drug use: No     Sexual activity: Not on file   Lifestyle     Physical activity:     Days per week: Not on file     Minutes per session: Not on file     Stress: Not on file   Relationships     Social connections:     Talks on phone: Not on file     Gets together: Not on file     Attends [employer] service: Not on file     Active member of club or organization: Not on file     Attends meetings of clubs or organizations: Not on file     Relationship status: Not on file     Intimate partner violence:     Fear of current or ex partner: Not on file     Emotionally abused: Not on file     Physically abused: Not on file     Forced sexual activity: Not on file   Other Topics Concern     Parent/sibling w/ CABG, MI or angioplasty before 65F 55M? Not Asked   Social History Narrative     Not on file         FAMILY HISTORY     History reviewed. No pertinent family history.      REVIEW OF SYSTEMS:    Comprehensive review of systems otherwise was negative, including constitutional, head and neck,  cardiovascular, pulmonary, gastrointestinal, endocrine, urologic, reproductive, rheumatic, hematologic, immunologic, dermatologic, and psychiatric.    Nutritional concerns: None  Driving issues: None   Safety concerns regarding living situations and safety at home: None  Risk of falls: None  Pain: None    PHYSICAL EXAM:    Hair, skin, nails, and joints were normal. Neck was supple without Lhermitte's phenomenon.  There was no percussion tenderness over the spine.     The patient was alert and oriented to person, place, and time with normal language, attention and concentration, recent and remote memory, praxis, and intellectual function. Affect was normal. The patient did not appear depressed.    Visual acuity:  OD 20/20  OS 20/20    Correction: without    Visual fields were full to confrontation.   Pupils were 3 mm and briskly reactive OU without a relative afferent pupillary defect.  Funduscopic examination was normal without disc edema, erythema, or atrophy.  Extraocular movements: dysconjugate gaze  Facial sensation is normal. Normal strength of the muscles of mastication:   Muscles of facial expression were normal  Hearing was normal. Gag reflex and palatal movements were normal. Sternocleidomastoid and trapezius power were normal. Tongue movements were normal. There was no dysarthria.    Motor Examination:   There was no pronator drift.       Motor    Upper      Right Left   Shoulder Abduction 5 5   Elbow Flexion 5 5   Elbow Extension 5 5   Wrist Extension 5 5   Digit Extension 5 5   Digit Flexion 5 5   APB 5 5   Tone 0 0   Lower       Right Left   Hip Flexion 5 5   Knee Extension 5 5   Knee Flexion 5 5   Foot Dorsiflexion 5 5   Foot Plantar Flexion 5 5   EH 5 5   Toe Flexion 5 5   Tone 0 0               Reflexes:     Reflexes       Right  Left   Biceps 1  1   Triceps 1  1   Brachioradialis 1  1   Patellar  1  1   Achilles 1  1   Babinski down  down         Coordination:     Right Left   RRM Normal Normal    WALDO Normal Normal   FTN Normal Normal   RRM Normal Normal   HKS Normal Normal         Sensory examination:    Light touch:  Intact in all extremities      Coordination and Gait        Gait Normal   Right Left   Romberg abnormal  Heel Normal Normal   Tandem Normal slow  Toe Normal Normal           REVIEW OF IMAGING STUDIES:    I personally reviewed the following images:    Not available    ASSESSMENT:    18 year old female with complicated PMH as above complaining of intermittent symptoms of dizziness, tremors, fatigue, worsening diplopia and one episode of vision loss lasting one week. Symptoms are somewhat concerning for MS but neurological exam is grossly normal except for diplopia which she had since childhood and abnormal Romberg.    PLAN:    We will obtain an MRI of the brain w/wo contrast to evaluate for lesions supportive of the diagnosis of MS.    Finally I will follow the patient up in 2 week(s) as long as the patient is doing well. I instructed the patient to call or mychart my office with any concerns or questions.    I spent 60 minutes in this visit, with >50% direct patient time spent counseling about prognosis, treatment options, and coordination of care.     My recommendations will be communicated back to the patient's physician(s) by mail.  Follow-up is expected to be with me.    (Chart documentation was completed in part with Dragon voice-recognition software. Even though reviewed, some grammatical, spelling, and word errors may remain.)       Again, thank you for allowing me to participate in the care of your patient.      Sincerely,    Kelly Garcia MD

## 2019-03-05 ENCOUNTER — OFFICE VISIT (OUTPATIENT)
Dept: DERMATOLOGY | Facility: CLINIC | Age: 19
End: 2019-03-05
Payer: COMMERCIAL

## 2019-03-05 DIAGNOSIS — L70.0 NODULOCYSTIC ACNE: Primary | ICD-10-CM

## 2019-03-05 DIAGNOSIS — L70.0 ACNE VULGARIS: ICD-10-CM

## 2019-03-05 RX ORDER — MINOCYCLINE HYDROCHLORIDE 100 MG/1
100 CAPSULE ORAL 2 TIMES DAILY
Qty: 60 CAPSULE | Refills: 3 | Status: SHIPPED | OUTPATIENT
Start: 2019-03-05 | End: 2019-10-14

## 2019-03-05 ASSESSMENT — PAIN SCALES - GENERAL: PAINLEVEL: NO PAIN (0)
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
## 2019-03-05 NOTE — NURSING NOTE
Dermatology Rooming Note    Sheri Fuller's goals for this visit include:   Chief Complaint   Patient presents with     Acne     Rocha is here today for an acne follow up.      GIRISH Galdamez
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
## 2019-03-13 ENCOUNTER — ANCILLARY PROCEDURE (OUTPATIENT)
Dept: MRI IMAGING | Facility: CLINIC | Age: 19
End: 2019-03-13
Attending: PSYCHIATRY & NEUROLOGY
Payer: COMMERCIAL

## 2019-03-13 ENCOUNTER — OFFICE VISIT (OUTPATIENT)
Dept: NEUROLOGY | Facility: CLINIC | Age: 19
End: 2019-03-13
Attending: PSYCHIATRY & NEUROLOGY
Payer: COMMERCIAL

## 2019-03-13 VITALS
OXYGEN SATURATION: 96 % | HEART RATE: 102 BPM | BODY MASS INDEX: 21.3 KG/M2 | DIASTOLIC BLOOD PRESSURE: 71 MMHG | WEIGHT: 136 LBS | SYSTOLIC BLOOD PRESSURE: 107 MMHG

## 2019-03-13 DIAGNOSIS — R42 DIZZINESS: Primary | ICD-10-CM

## 2019-03-13 DIAGNOSIS — R42 DIZZINESS: ICD-10-CM

## 2019-03-13 PROCEDURE — G0463 HOSPITAL OUTPT CLINIC VISIT: HCPCS | Mod: ZF

## 2019-03-13 RX ORDER — GADOBUTROL 604.72 MG/ML
7.5 INJECTION INTRAVENOUS ONCE
Status: COMPLETED | OUTPATIENT
Start: 2019-03-13 | End: 2019-03-13

## 2019-03-13 RX ADMIN — GADOBUTROL 6.5 ML: 604.72 INJECTION INTRAVENOUS at 14:41

## 2019-03-13 ASSESSMENT — PAIN SCALES - GENERAL: PAINLEVEL: NO PAIN (0)

## 2019-03-13 NOTE — NURSING NOTE
Chief Complaint   Patient presents with     RECHECK     [facility] RETURN MS 2 WK F/U AFTER MRI     /71 (BP Location: Left arm, Patient Position: Chair, Cuff Size: Adult Regular)   Pulse 102   Wt 61.7 kg (136 lb)   SpO2 96%   BMI 21.30 kg/m      Bekah Le, EMT

## 2019-03-13 NOTE — LETTER
3/13/2019       RE: Sheri Fuller  [address]  [facility] [address]     Dear Colleague,    Thank you for referring your patient, Sheri Fuller, to the [facility] MULTIPLE SCLEROSIS at [facility]. Please see a copy of my visit note below.    THE [facility] MULTIPLE SCLEROSIS CLINIC  FOLLOW UP VISIT           PRINCIPAL NEUROLOGIC DIAGNOSIS: Episodic Dizzines        HISTORY OF ILLNESS:    This is a follow visit for this 18 year old right handed genetic female  With a history of Autism. Who was last seen on  2-27-19.   At that time the patient was recommended to obtain an MRI of the brain w/wo contrast to evaluate for demyelinating lesions supportive of the diagnosis of MS. Since last visit she underwent such MRI this AM which will be discussed in a further section. Her symptoms have gradually improved but still happening occasionally. She takes CBD oild infrequently for pain but does not see a corelation with the nausea/dizziness.    Current Symptoms:  1. Dizziness  2. Leg tremors  3. Nausea  4. headache      Current Outpatient Prescriptions:  Current Outpatient Medications   Medication     CHILDRENS SILAPAP 160 MG/5ML liquid     ibuprofen (ADVIL/MOTRIN) 600 MG tablet     magnesium oxide (MAG-OX) 400 (241.3 Mg) MG tablet     minocycline (MINOCIN/DYNACIN) 100 MG capsule     vitamin D3 (CHOLECALCIFEROL) 1000 units (25 mcg) tablet     clonazePAM (KLONOPIN) 0.5 MG ODT     No current facility-administered medications for this visit.           ALLERGIES     No Known Allergies        REVIEW OF SYSTEMS:    Comprehensive review of systems otherwise was negative, including constitutional, head and neck, cardiovascular, pulmonary, gastrointestinal, endocrine, urologic, reproductive, rheumatic, hematologic, immunologic, dermatologic, and psychiatric.    Nutritional concerns: None  Driving issues: None   Safety concerns regarding living situations and safety at home:  None  Risk of falls: None  Pain: None    PHYSICAL EXAM:     Hair, skin, nails, and joints were normal. Neck was supple without Lhermitte's phenomenon.  There was no percussion tenderness over the spine.      The patient was alert and oriented to person, place, and time with normal language, attention and concentration, recent and remote memory, praxis, and intellectual function. Affect was normal. The patient did not appear depressed.     Visual acuity:  OD 20/20  OS 20/20    Correction: without     Visual fields were full to confrontation.   Pupils were 3 mm and briskly reactive OU without a relative afferent pupillary defect.  Funduscopic examination was normal without disc edema, erythema, or atrophy.  Extraocular movements: dysconjugate gaze  Facial sensation is normal. Normal strength of the muscles of mastication:   Muscles of facial expression were normal  Hearing was normal. Gag reflex and palatal movements were normal. Sternocleidomastoid and trapezius power were normal. Tongue movements were normal. There was no dysarthria.     Motor Examination:   There was no pronator drift.         Motor     Upper         Right Left   Shoulder Abduction 5 5   Elbow Flexion 5 5   Elbow Extension 5 5   Wrist Extension 5 5   Digit Extension 5 5   Digit Flexion 5 5   APB 5 5   Tone 0 0   Lower          Right Left   Hip Flexion 5 5   Knee Extension 5 5   Knee Flexion 5 5   Foot Dorsiflexion 5 5   Foot Plantar Flexion 5 5   EH 5 5   Toe Flexion 5 5   Tone 0 0                    Reflexes:      Reflexes           Right   Left   Biceps 1   1   Triceps 1   1   Brachioradialis 1   1   Patellar  1   1   Achilles 1   1   Babinski down   down            Coordination:       Right Left   RRM Normal Normal   WALDO Normal Normal   FTN Normal Normal   RRM Normal Normal   HKS Normal Normal            Sensory examination:     Light touch:  Intact in all extremities        Coordination and Gait           Gait Normal     Right Left   Romberg  abnormal   Heel Normal Normal   Tandem Normal slow   Toe Normal Normal               REVIEW OF IMAGING STUDIES:       I personally reviewed the following images:    MRI of the brain from today shows no abnormalities, one hyperintense lesion in the L parietal lobe, non-specific, without enhancement on post jessica images, no other abnormalities except for significant L maxillary, and frontal sinus inflammation.    ASSESSMENT:    After reviewing the MRI my suspicion is that she has a sinus infection that potentially affected the inner ear, they confirmed that she had tonsillitis when the symptoms started so she may have had labyrinthitis, currently improving. She denies fever, nasal discharge or headaches so there is no evidence of sinus infection just chronic inflammation (she has had this before). She was reassured I do not see any clinical or radiological evidence of MS nor can the one small lesion (which can be seen in normal subjects) explain her symptoms.     PLAN:    No need to follow up with neurology, consider having PCP see the MRI to document sinusitis and referral to ENT if evidence of infection.     I spent 45 minutes in this visit, with >50% direct patient time spent counseling about prognosis, treatment options, and coordination of care.     My recommendations will be communicated back to the patient's physician(s) by mail.  Follow-up is expected to be with the patient's primary care physician.    (Chart documentation was completed in part with Dragon voice-recognition software. Even though reviewed, some grammatical, spelling, and word errors may remain.)       Again, thank you for allowing me to participate in the care of your patient.      Sincerely,    Kelly Garcia MD

## 2019-03-13 NOTE — PROGRESS NOTES
THE [facility] MULTIPLE SCLEROSIS CLINIC  FOLLOW UP VISIT           PRINCIPAL NEUROLOGIC DIAGNOSIS: Episodic Dizzines        HISTORY OF ILLNESS:    This is a follow visit for this 18 year old right handed genetic female  With a history of Autism. Who was last seen on  2-27-19.   At that time the patient was recommended to obtain an MRI of the brain w/wo contrast to evaluate for demyelinating lesions supportive of the diagnosis of MS. Since last visit she underwent such MRI this AM which will be discussed in a further section. Her symptoms have gradually improved but still happening occasionally. She takes CBD oild infrequently for pain but does not see a corelation with the nausea/dizziness.    Current Symptoms:  1. Dizziness  2. Leg tremors  3. Nausea  4. headache      Current Outpatient Prescriptions:  Current Outpatient Medications   Medication     CHILDRENS SILAPAP 160 MG/5ML liquid     ibuprofen (ADVIL/MOTRIN) 600 MG tablet     magnesium oxide (MAG-OX) 400 (241.3 Mg) MG tablet     minocycline (MINOCIN/DYNACIN) 100 MG capsule     vitamin D3 (CHOLECALCIFEROL) 1000 units (25 mcg) tablet     clonazePAM (KLONOPIN) 0.5 MG ODT     No current facility-administered medications for this visit.           ALLERGIES     No Known Allergies        REVIEW OF SYSTEMS:    Comprehensive review of systems otherwise was negative, including constitutional, head and neck, cardiovascular, pulmonary, gastrointestinal, endocrine, urologic, reproductive, rheumatic, hematologic, immunologic, dermatologic, and psychiatric.    Nutritional concerns: None  Driving issues: None   Safety concerns regarding living situations and safety at home: None  Risk of falls: None  Pain: None    PHYSICAL EXAM:     Hair, skin, nails, and joints were normal. Neck was supple without Lhermitte's phenomenon.  There was no percussion tenderness over the spine.      The patient was alert and oriented to person, place, and time with normal  language, attention and concentration, recent and remote memory, praxis, and intellectual function. Affect was normal. The patient did not appear depressed.     Visual acuity:  OD 20/20  OS 20/20    Correction: without     Visual fields were full to confrontation.   Pupils were 3 mm and briskly reactive OU without a relative afferent pupillary defect.  Funduscopic examination was normal without disc edema, erythema, or atrophy.  Extraocular movements: dysconjugate gaze  Facial sensation is normal. Normal strength of the muscles of mastication:   Muscles of facial expression were normal  Hearing was normal. Gag reflex and palatal movements were normal. Sternocleidomastoid and trapezius power were normal. Tongue movements were normal. There was no dysarthria.     Motor Examination:   There was no pronator drift.         Motor     Upper         Right Left   Shoulder Abduction 5 5   Elbow Flexion 5 5   Elbow Extension 5 5   Wrist Extension 5 5   Digit Extension 5 5   Digit Flexion 5 5   APB 5 5   Tone 0 0   Lower          Right Left   Hip Flexion 5 5   Knee Extension 5 5   Knee Flexion 5 5   Foot Dorsiflexion 5 5   Foot Plantar Flexion 5 5   EH 5 5   Toe Flexion 5 5   Tone 0 0                    Reflexes:      Reflexes           Right   Left   Biceps 1   1   Triceps 1   1   Brachioradialis 1   1   Patellar  1   1   Achilles 1   1   Babinski down   down            Coordination:       Right Left   RRM Normal Normal   WALDO Normal Normal   FTN Normal Normal   RRM Normal Normal   HKS Normal Normal            Sensory examination:     Light touch:  Intact in all extremities        Coordination and Gait           Gait Normal     Right Left   Romberg abnormal   Heel Normal Normal   Tandem Normal slow   Toe Normal Normal               REVIEW OF IMAGING STUDIES:       I personally reviewed the following images:    MRI of the brain from today shows no abnormalities, one hyperintense lesion in the L parietal lobe, non-specific,  without enhancement on post jessica images, no other abnormalities except for significant L maxillary, and frontal sinus inflammation.    ASSESSMENT:    After reviewing the MRI my suspicion is that she has a sinus infection that potentially affected the inner ear, they confirmed that she had tonsillitis when the symptoms started so she may have had labyrinthitis, currently improving. She denies fever, nasal discharge or headaches so there is no evidence of sinus infection just chronic inflammation (she has had this before). She was reassured I do not see any clinical or radiological evidence of MS nor can the one small lesion (which can be seen in normal subjects) explain her symptoms.     PLAN:    No need to follow up with neurology, consider having PCP see the MRI to document sinusitis and referral to ENT if evidence of infection.     I spent 45 minutes in this visit, with >50% direct patient time spent counseling about prognosis, treatment options, and coordination of care.     My recommendations will be communicated back to the patient's physician(s) by mail.  Follow-up is expected to be with the patient's primary care physician.      Kelly Garcia MD  Chief, Multiple Sclerosis Division  Department of Neurology  [facility]      (Chart documentation was completed in part with Dragon voice-recognition software. Even though reviewed, some grammatical, spelling, and word errors may remain.)

## 2019-05-07 ENCOUNTER — OFFICE VISIT (OUTPATIENT)
Dept: FAMILY MEDICINE | Facility: CLINIC | Age: 19
End: 2019-05-07
Payer: COMMERCIAL

## 2019-05-07 VITALS
SYSTOLIC BLOOD PRESSURE: 102 MMHG | DIASTOLIC BLOOD PRESSURE: 66 MMHG | WEIGHT: 147 LBS | HEART RATE: 83 BPM | OXYGEN SATURATION: 96 % | BODY MASS INDEX: 23.63 KG/M2 | HEIGHT: 66 IN

## 2019-05-07 DIAGNOSIS — J32.0 CHRONIC MAXILLARY SINUSITIS: Primary | ICD-10-CM

## 2019-05-07 DIAGNOSIS — F42.9 OBSESSIVE-COMPULSIVE DISORDER, UNSPECIFIED TYPE: ICD-10-CM

## 2019-05-07 DIAGNOSIS — F43.10 PTSD (POST-TRAUMATIC STRESS DISORDER): ICD-10-CM

## 2019-05-07 DIAGNOSIS — F32.2 CURRENT SEVERE EPISODE OF MAJOR DEPRESSIVE DISORDER WITHOUT PSYCHOTIC FEATURES, UNSPECIFIED WHETHER RECURRENT (H): ICD-10-CM

## 2019-05-07 RX ORDER — FLUTICASONE PROPIONATE 50 MCG
2 SPRAY, SUSPENSION (ML) NASAL DAILY
Qty: 16 G | Refills: 3 | Status: ON HOLD | OUTPATIENT
Start: 2019-05-07 | End: 2019-06-04

## 2019-05-07 ASSESSMENT — MIFFLIN-ST. JEOR: SCORE: 1453.54

## 2019-05-07 NOTE — PROGRESS NOTES
Preceptor Attestation:   Patient seen, evaluated and discussed with the resident. I have verified the content of the note, which accurately reflects my assessment of the patient and the plan of care.   Supervising Physician:  Riana Jamison MD

## 2019-05-07 NOTE — PATIENT INSTRUCTIONS
Here is the plan from today's visit    1. Chronic maxillary sinusitis  - do sinus rinses (Neilmed/netipot sinus rinse)  - will do ENT referral - keep if flonase daily isn't working  - fluticasone (FLONASE) 50 MCG/ACT nasal spray; Spray 2 sprays into both nostrils daily  Dispense: 16 g; Refill: 3  - OTOLARYNGOLOGY REFERRAL - INTERNAL    2. Current severe episode of major depressive disorder without psychotic features, unspecified whether recurrent (H)  - we will have you see Dr Michele (psychiactrist) to ask about bipolar and medicines  - BEHAVIORAL HEALTH REFERRAL ([facility] interal and external)    3. PTSD (post-traumatic stress disorder)    - BEHAVIORAL HEALTH REFERRAL ([facility] interal and external)    4. Obsessive-compulsive disorder, unspecified type    - BEHAVIORAL HEALTH REFERRAL ([facility] interal and external)      Please call or return to clinic if your symptoms don't go away.    Follow up plan  Please make a clinic appointment for follow up with me (ALVIN RANKIN) after you see Dr Michele    Thank you for coming to [facility] today.  Lab Testing:  **If you had lab testing today and your results are reassuring or normal they will be mailed to you or sent through [employer] within 7 days.   **If the lab tests need quick action we will call you with the results.  The phone number we will call with results is # [phone] (home) . If this is not the best number please call our clinic and change the number.  Medication Refills:  If you need any refills please call your pharmacy and they will contact us.   If you need to  your refill at a new pharmacy, please contact the new pharmacy directly. The new pharmacy will help you get your medications transferred faster.   Scheduling:  If you have any concerns about today's visit or wish to schedule another appointment please call our office during normal business hours [phone] (8-5:00 M-F)  If a referral was made to a [facility] and you don't get a call from central scheduling please call [phone].  If a Mammogram was ordered for you at [facility] call [phone] to schedule or change your appointment.  If you had an XRay/CT/Ultrasound/MRI ordered the number is [phone] to schedule or change your radiology appointment.   Medical Concerns:  If you have urgent medical concerns please call [phone] at any time of the day.    Sabina Hogan, DO

## 2019-05-07 NOTE — PROGRESS NOTES
"       HPI       Sheri Fuller is a 18 year old  who presents for   Chief Complaint   Patient presents with     Physical     1. Severe depression, concern for bipolar  - has therapist lined up for next Mon: Simone Olivarez at [facility]. He is trans and specializes in autism and OCD/PTSD like Aj wants. They're very excited to see him.  - concern for bipolar on psychology today  - depression has been well known  - concern for hypomania:   - PHQ9: 18  - either all up or all down, same with appetite. Gets irritated if up and everyone else isn't up.  - when down, super down  - no irrational spending, no   - confidence either totally gone, or over the moon  - hasn't been like this whole life, feels like more exaggerated recently in the last 5 years    2. ENT  - still having some symptoms   - allergy runny nose  - still some nausea and leg and dizziness  - no recorded or subjective fevers    Social update: Aj and her partner Alana  4/5/19. Aj legally changed her name to Kolby Arriaga, legally changed sex to \"X\". States she is fine with any pronouns, does not have a preference. She is very happy with this. Updated insurance cards are to come in the mail soon.      Problem, Medication and Allergy Lists were reviewed and updated if needed..    Patient is an established patient of this clinic..         Review of Systems:   Review of Systems         Physical Exam:     Vitals:    05/07/19 1541   BP: 102/66   Pulse: 83   SpO2: 96%   Weight: 66.7 kg (147 lb)   Height: 1.676 m (5' 6\")     Body mass index is 23.73 kg/m .  Vitals were reviewed and were normal     Physical Exam   Constitutional: Aj Fuller is oriented to person, place, and time. Aj Fuller appears well-developed and well-nourished. No distress.   HENT:   Head: Normocephalic and atraumatic.   Nose: No rhinorrhea. Right sinus exhibits maxillary sinus tenderness. Left sinus exhibits maxillary sinus tenderness.   Mouth/Throat: Oropharynx is clear and moist. " "Normal dentition. Tonsils are 2+ on the right. Tonsils are 2+ on the left. No tonsillar exudate.   Cardiovascular: Normal rate, regular rhythm and normal heart sounds.   Pulmonary/Chest: Effort normal and breath sounds normal. No respiratory distress.   Neurological: Aj Fuller is alert and oriented to person, place, and time.   Skin: Skin is warm and dry. There is pallor.   Cystic acne present   Psychiatric: Aj Fuller's behavior is normal. Judgment and thought content normal.   Affect brighter than usual, smiles spontaneously. Speech more fluid and takes minimal prompting (improved from last few visits)         Results:   No testing ordered today    Assessment and Plan      Aj Martinez) is a 18 year old nonbinary person who presents today for follow up on their chronic maxillary sinusitis/periodic dizziness, and for follow up on their mental health.    1. Chronic maxillary sinusitis  MRI with neuro negative for MS or other demylenating disorder, did reveal chronic inflammation in maxillary sinuses, and there may be a component of labyrinthitis to Aj's periodic dizziness. No further follow up with neuro needed.  Will start intranasal steroid today, and recommended nasal saline irrigation. Will refer to ENT today, and Aj can cancel appt if congestion, sinus tenderness, and dizziness are improving with intranasal steroid.  - fluticasone (FLONASE) 50 MCG/ACT nasal spray; Spray 2 sprays into both nostrils daily  Dispense: 16 g; Refill: 3  - OTOLARYNGOLOGY REFERRAL - INTERNAL    2. Current severe episode of major depressive disorder without psychotic features, unspecified whether recurrent (H)  3. PTSD (post-traumatic stress disorder)  4. Obsessive-compulsive disorder, unspecified type  Patient still has severe episodic depression, although per their report and on exam they appear to be in a rare \"good/up\" phase today, likely related to recent life changes and relief at not having MS. PHQ-9 of 18 and GAD7 of 12 " today, still with some passive suicidal ideation of not wanting to be in pain, no plan or intent.   History is certainly concerning for bipolar 2, does not appear to have had true manic episode, and so will hold off on initiating ssri/snri today to avoid inducing manic episode. Patient is agreeable to medications. Will refer for psych consult with Dr Michele to rule in/out bipolar and make medication recommendations. Will follow up after psych consult for medication management. Uncontrolled mental health concerns are very likely a large part of her vague symptoms that she presented with this winter, given reassuring workup.  Patient is establishing with therapist, will encourage regular follow up with them.    - BEHAVIORAL HEALTH REFERRAL ([facility] interal and external)       Medications Discontinued During This Encounter   Medication Reason     magnesium oxide (MAG-OX) 400 (241.3 Mg) MG tablet        Options for treatment and follow-up care were reviewed with the patient. Sheri Fuller  engaged in the decision making process and verbalized understanding of the options discussed and agreed with the final plan.    DO [facility] Resident PGY-1

## 2019-05-08 ENCOUNTER — TELEPHONE (OUTPATIENT)
Dept: PSYCHOLOGY | Facility: CLINIC | Age: 19
End: 2019-05-08

## 2019-05-08 NOTE — TELEPHONE ENCOUNTER
Psychiatry Consult Referral:  Please schedule this patient with Dr. Michele or Dr. Hernández.  This is for a one time consult only, not for ongoing psychiatric care.  They provide recommendations to the PCP for ongoing care.     Please let the patient know that this is an educational consult clinic.  For that reason, Dr. Michele or Edgar and a resident will be seeing the patient together.  If the patient is not comfortable with that we can assist with making arrangements for a psychiatry consult at an outside clinic.    If you are unable to reach the patient after two phone calls, please send a letter and close this encounter.    Thank you!

## 2019-05-24 ASSESSMENT — ANXIETY QUESTIONNAIRES
5. BEING SO RESTLESS THAT IT IS HARD TO SIT STILL: SEVERAL DAYS
2. NOT BEING ABLE TO STOP OR CONTROL WORRYING: MORE THAN HALF THE DAYS
IF YOU CHECKED OFF ANY PROBLEMS ON THIS QUESTIONNAIRE, HOW DIFFICULT HAVE THESE PROBLEMS MADE IT FOR YOU TO DO YOUR WORK, TAKE CARE OF THINGS AT HOME, OR GET ALONG WITH OTHER PEOPLE: SOMEWHAT DIFFICULT
3. WORRYING TOO MUCH ABOUT DIFFERENT THINGS: MORE THAN HALF THE DAYS
GAD7 TOTAL SCORE: 12
7. FEELING AFRAID AS IF SOMETHING AWFUL MIGHT HAPPEN: MORE THAN HALF THE DAYS
6. BECOMING EASILY ANNOYED OR IRRITABLE: MORE THAN HALF THE DAYS
1. FEELING NERVOUS, ANXIOUS, OR ON EDGE: MORE THAN HALF THE DAYS

## 2019-05-24 ASSESSMENT — PATIENT HEALTH QUESTIONNAIRE - PHQ9
SUM OF ALL RESPONSES TO PHQ QUESTIONS 1-9: 18
5. POOR APPETITE OR OVEREATING: SEVERAL DAYS

## 2019-05-25 ASSESSMENT — ANXIETY QUESTIONNAIRES: GAD7 TOTAL SCORE: 12

## 2019-05-26 ENCOUNTER — HOSPITAL ENCOUNTER (INPATIENT)
Facility: CLINIC | Age: 19
LOS: 9 days | Discharge: HOME | End: 2019-06-04
Attending: PSYCHIATRY & NEUROLOGY | Admitting: PSYCHIATRY & NEUROLOGY
Payer: COMMERCIAL

## 2019-05-26 ENCOUNTER — HOSPITAL ENCOUNTER (EMERGENCY)
Facility: CLINIC | Age: 19
Discharge: PSYCHIATRIC OR SUBSTANCE USE FACILITY | End: 2019-05-26
Attending: EMERGENCY MEDICINE | Admitting: EMERGENCY MEDICINE
Payer: COMMERCIAL

## 2019-05-26 VITALS
SYSTOLIC BLOOD PRESSURE: 109 MMHG | TEMPERATURE: 98.5 F | WEIGHT: 139 LBS | BODY MASS INDEX: 22.44 KG/M2 | OXYGEN SATURATION: 98 % | RESPIRATION RATE: 14 BRPM | DIASTOLIC BLOOD PRESSURE: 70 MMHG | HEART RATE: 78 BPM

## 2019-05-26 DIAGNOSIS — F20.2 CATATONIC SCHIZOPHRENIA (H): Primary | ICD-10-CM

## 2019-05-26 DIAGNOSIS — F32.A DEPRESSION, UNSPECIFIED DEPRESSION TYPE: ICD-10-CM

## 2019-05-26 PROBLEM — F29 PSYCHOSIS (H): Status: ACTIVE | Noted: 2019-05-26

## 2019-05-26 LAB
ALBUMIN UR-MCNC: NEGATIVE MG/DL
AMPHETAMINES UR QL SCN: NEGATIVE
ANION GAP SERPL CALCULATED.3IONS-SCNC: 6 MMOL/L (ref 3–14)
APPEARANCE UR: CLEAR
BARBITURATES UR QL: NEGATIVE
BASOPHILS # BLD AUTO: 0 10E9/L (ref 0–0.2)
BASOPHILS NFR BLD AUTO: 0.3 %
BENZODIAZ UR QL: NEGATIVE
BILIRUB UR QL STRIP: NEGATIVE
BUN SERPL-MCNC: 14 MG/DL (ref 7–30)
CALCIUM SERPL-MCNC: 9.3 MG/DL (ref 8.5–10.1)
CANNABINOIDS UR QL SCN: POSITIVE
CHLORIDE SERPL-SCNC: 107 MMOL/L (ref 94–109)
CO2 SERPL-SCNC: 26 MMOL/L (ref 20–32)
COCAINE UR QL: NEGATIVE
COLOR UR AUTO: YELLOW
CREAT SERPL-MCNC: 0.98 MG/DL (ref 0.52–1.04)
DIFFERENTIAL METHOD BLD: NORMAL
EOSINOPHIL # BLD AUTO: 0 10E9/L (ref 0–0.7)
EOSINOPHIL NFR BLD AUTO: 0.5 %
ERYTHROCYTE [DISTWIDTH] IN BLOOD BY AUTOMATED COUNT: 12.9 % (ref 10–15)
GFR SERPL CREATININE-BSD FRML MDRD: 82 ML/MIN/{1.73_M2}
GLUCOSE SERPL-MCNC: 86 MG/DL (ref 70–99)
GLUCOSE UR STRIP-MCNC: NEGATIVE MG/DL
HCT VFR BLD AUTO: 38.5 % (ref 35–47)
HGB BLD-MCNC: 13.2 G/DL (ref 11.7–15.7)
HGB UR QL STRIP: NEGATIVE
IMM GRANULOCYTES # BLD: 0 10E9/L (ref 0–0.4)
IMM GRANULOCYTES NFR BLD: 0.2 %
KETONES UR STRIP-MCNC: 40 MG/DL
LEUKOCYTE ESTERASE UR QL STRIP: NEGATIVE
LYMPHOCYTES # BLD AUTO: 1.6 10E9/L (ref 0.8–5.3)
LYMPHOCYTES NFR BLD AUTO: 18.3 %
MCH RBC QN AUTO: 30.9 PG (ref 26.5–33)
MCHC RBC AUTO-ENTMCNC: 34.3 G/DL (ref 31.5–36.5)
MCV RBC AUTO: 90 FL (ref 78–100)
MONOCYTES # BLD AUTO: 0.5 10E9/L (ref 0–1.3)
MONOCYTES NFR BLD AUTO: 6.1 %
NEUTROPHILS # BLD AUTO: 6.6 10E9/L (ref 1.6–8.3)
NEUTROPHILS NFR BLD AUTO: 74.6 %
NITRATE UR QL: NEGATIVE
NRBC # BLD AUTO: 0 10*3/UL
NRBC BLD AUTO-RTO: 0 /100
OPIATES UR QL SCN: NEGATIVE
PCP UR QL SCN: NEGATIVE
PH UR STRIP: 5.5 PH (ref 5–7)
PLATELET # BLD AUTO: 312 10E9/L (ref 150–450)
POTASSIUM SERPL-SCNC: 3.7 MMOL/L (ref 3.4–5.3)
RBC # BLD AUTO: 4.27 10E12/L (ref 3.8–5.2)
SODIUM SERPL-SCNC: 139 MMOL/L (ref 133–144)
SOURCE: ABNORMAL
SP GR UR STRIP: 1.02 (ref 1–1.03)
T4 FREE SERPL-MCNC: 1.32 NG/DL (ref 0.76–1.46)
TSH SERPL DL<=0.005 MIU/L-ACNC: 6.52 MU/L (ref 0.4–4)
UROBILINOGEN UR STRIP-MCNC: NORMAL MG/DL (ref 0–2)
WBC # BLD AUTO: 8.8 10E9/L (ref 4–11)

## 2019-05-26 PROCEDURE — 85025 COMPLETE CBC W/AUTO DIFF WBC: CPT | Performed by: EMERGENCY MEDICINE

## 2019-05-26 PROCEDURE — 80048 BASIC METABOLIC PNL TOTAL CA: CPT | Performed by: EMERGENCY MEDICINE

## 2019-05-26 PROCEDURE — 84439 ASSAY OF FREE THYROXINE: CPT | Performed by: EMERGENCY MEDICINE

## 2019-05-26 PROCEDURE — 99285 EMERGENCY DEPT VISIT HI MDM: CPT | Mod: 25

## 2019-05-26 PROCEDURE — 81003 URINALYSIS AUTO W/O SCOPE: CPT | Performed by: EMERGENCY MEDICINE

## 2019-05-26 PROCEDURE — 25000132 ZZH RX MED GY IP 250 OP 250 PS 637: Performed by: EMERGENCY MEDICINE

## 2019-05-26 PROCEDURE — 12400001 ZZH R&B MH UMMC

## 2019-05-26 PROCEDURE — 25000132 ZZH RX MED GY IP 250 OP 250 PS 637: Performed by: PSYCHIATRY & NEUROLOGY

## 2019-05-26 PROCEDURE — 84443 ASSAY THYROID STIM HORMONE: CPT | Performed by: EMERGENCY MEDICINE

## 2019-05-26 PROCEDURE — 90791 PSYCH DIAGNOSTIC EVALUATION: CPT

## 2019-05-26 PROCEDURE — 80307 DRUG TEST PRSMV CHEM ANLYZR: CPT | Performed by: EMERGENCY MEDICINE

## 2019-05-26 RX ORDER — ALUMINA, MAGNESIA, AND SIMETHICONE 2400; 2400; 240 MG/30ML; MG/30ML; MG/30ML
30 SUSPENSION ORAL EVERY 4 HOURS PRN
Status: DISCONTINUED | OUTPATIENT
Start: 2019-05-26 | End: 2019-06-04 | Stop reason: HOSPADM

## 2019-05-26 RX ORDER — OLANZAPINE 5 MG/1
5 TABLET, ORALLY DISINTEGRATING ORAL ONCE
Status: COMPLETED | OUTPATIENT
Start: 2019-05-26 | End: 2019-05-26

## 2019-05-26 RX ORDER — ACETAMINOPHEN 325 MG/1
650 TABLET ORAL EVERY 4 HOURS PRN
Status: DISCONTINUED | OUTPATIENT
Start: 2019-05-26 | End: 2019-06-04 | Stop reason: HOSPADM

## 2019-05-26 RX ORDER — TRAZODONE HYDROCHLORIDE 50 MG/1
50 TABLET, FILM COATED ORAL
Status: DISCONTINUED | OUTPATIENT
Start: 2019-05-26 | End: 2019-06-04 | Stop reason: HOSPADM

## 2019-05-26 RX ORDER — BISACODYL 10 MG
10 SUPPOSITORY, RECTAL RECTAL DAILY PRN
Status: DISCONTINUED | OUTPATIENT
Start: 2019-05-26 | End: 2019-06-04 | Stop reason: HOSPADM

## 2019-05-26 RX ORDER — OLANZAPINE 10 MG/1
10 TABLET ORAL
Status: DISCONTINUED | OUTPATIENT
Start: 2019-05-26 | End: 2019-06-04 | Stop reason: HOSPADM

## 2019-05-26 RX ORDER — OLANZAPINE 10 MG/2ML
10 INJECTION, POWDER, FOR SOLUTION INTRAMUSCULAR
Status: DISCONTINUED | OUTPATIENT
Start: 2019-05-26 | End: 2019-06-04 | Stop reason: HOSPADM

## 2019-05-26 RX ORDER — HYDROXYZINE HYDROCHLORIDE 25 MG/1
25 TABLET, FILM COATED ORAL EVERY 4 HOURS PRN
Status: DISCONTINUED | OUTPATIENT
Start: 2019-05-26 | End: 2019-06-04 | Stop reason: HOSPADM

## 2019-05-26 RX ADMIN — HYDROXYZINE HYDROCHLORIDE 25 MG: 25 TABLET ORAL at 17:08

## 2019-05-26 RX ADMIN — TRAZODONE HYDROCHLORIDE 50 MG: 50 TABLET ORAL at 21:58

## 2019-05-26 RX ADMIN — HYDROXYZINE HYDROCHLORIDE 25 MG: 25 TABLET ORAL at 21:58

## 2019-05-26 RX ADMIN — OLANZAPINE 5 MG: 5 TABLET, ORALLY DISINTEGRATING ORAL at 11:36

## 2019-05-26 RX ADMIN — OLANZAPINE 5 MG: 5 TABLET, ORALLY DISINTEGRATING ORAL at 09:41

## 2019-05-26 RX ADMIN — OLANZAPINE 10 MG: 10 TABLET, FILM COATED ORAL at 15:58

## 2019-05-26 RX ADMIN — OLANZAPINE 10 MG: 10 TABLET, FILM COATED ORAL at 20:03

## 2019-05-26 ASSESSMENT — ENCOUNTER SYMPTOMS
APPETITE CHANGE: 1
FATIGUE: 1
FEVER: 0
VOMITING: 1
ACTIVITY CHANGE: 1
HYPERACTIVE: 1
SLEEP DISTURBANCE: 1

## 2019-05-26 ASSESSMENT — ACTIVITIES OF DAILY LIVING (ADL)
DRESS: SCRUBS (BEHAVIORAL HEALTH);INDEPENDENT
HYGIENE/GROOMING: PROMPTS
ORAL_HYGIENE: PROMPTS

## 2019-05-26 NOTE — PROGRESS NOTES
Admitted pt. from [facility].  Pt. Is on a 72 hold.  Pt. currently is mute and needs encouragement and direction to comply with the admission profile.  Pt. Initially came to the ED for evaluation of manic behavior.  Pt.,  per repor,t has not slept for three to four days. Pt. has been mute in the ED and continues to be mute on admission.
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
## 2019-05-26 NOTE — ED NOTES
Patient is impulsive, appears to be very tired, but unable to sleep, jumping impulsively off bed, walking to door, screaming a few words,  is frustrated. Olanzapine given.

## 2019-05-26 NOTE — ED NOTES
Central Intake called , patient will be going to [facility], Station 32, under Dr. Waters.  Number to call, [phone].   I am still trying to get a urine collection from patient.

## 2019-05-26 NOTE — ED NOTES
Rocha is impulsive, yelling out a few words, woke her to bathroom trying to get a urine sample, but patient was unable to give a sample. Will try again later.

## 2019-05-26 NOTE — PROGRESS NOTES
Admitted pt. From [facility].  Pt. Is on a 72 hold.  Pt. Currently is mute and needs encouragement and direction to comply with the admission profile.  Pt. Initially came to the ED for evaluation of manic behavior.  Pt., per report, has not slept for three to four days.  Pt. Has been mute in the ED and continues to be  Mute on admission.
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
## 2019-05-27 PROCEDURE — 12400001 ZZH R&B MH UMMC

## 2019-05-27 PROCEDURE — 25000132 ZZH RX MED GY IP 250 OP 250 PS 637: Performed by: PSYCHIATRY & NEUROLOGY

## 2019-05-27 PROCEDURE — 99221 1ST HOSP IP/OBS SF/LOW 40: CPT | Mod: AI | Performed by: NURSE PRACTITIONER

## 2019-05-27 PROCEDURE — 25000128 H RX IP 250 OP 636: Performed by: NURSE PRACTITIONER

## 2019-05-27 PROCEDURE — 25000132 ZZH RX MED GY IP 250 OP 250 PS 637: Performed by: NURSE PRACTITIONER

## 2019-05-27 RX ORDER — DIPHENHYDRAMINE HCL 50 MG
50 CAPSULE ORAL EVERY 4 HOURS PRN
Status: DISCONTINUED | OUTPATIENT
Start: 2019-05-27 | End: 2019-06-04 | Stop reason: HOSPADM

## 2019-05-27 RX ORDER — LORAZEPAM 2 MG/ML
1-2 INJECTION INTRAMUSCULAR EVERY 4 HOURS PRN
Status: DISCONTINUED | OUTPATIENT
Start: 2019-05-27 | End: 2019-05-27

## 2019-05-27 RX ORDER — HALOPERIDOL 5 MG/ML
5 INJECTION INTRAMUSCULAR EVERY 4 HOURS PRN
Status: DISCONTINUED | OUTPATIENT
Start: 2019-05-27 | End: 2019-06-04 | Stop reason: HOSPADM

## 2019-05-27 RX ORDER — LORAZEPAM 2 MG/ML
1-2 INJECTION INTRAMUSCULAR EVERY 4 HOURS PRN
Status: DISCONTINUED | OUTPATIENT
Start: 2019-05-27 | End: 2019-06-04 | Stop reason: HOSPADM

## 2019-05-27 RX ORDER — DIPHENHYDRAMINE HCL 50 MG
50 CAPSULE ORAL EVERY 4 HOURS PRN
Status: DISCONTINUED | OUTPATIENT
Start: 2019-05-27 | End: 2019-05-27

## 2019-05-27 RX ORDER — HALOPERIDOL 5 MG/1
5 TABLET ORAL EVERY 4 HOURS PRN
Status: DISCONTINUED | OUTPATIENT
Start: 2019-05-27 | End: 2019-06-04 | Stop reason: HOSPADM

## 2019-05-27 RX ORDER — LORAZEPAM 1 MG/1
1-2 TABLET ORAL EVERY 4 HOURS PRN
Status: DISCONTINUED | OUTPATIENT
Start: 2019-05-27 | End: 2019-05-27

## 2019-05-27 RX ORDER — LORAZEPAM 2 MG/ML
2 INJECTION INTRAMUSCULAR ONCE
Status: COMPLETED | OUTPATIENT
Start: 2019-05-27 | End: 2019-05-27

## 2019-05-27 RX ORDER — LORAZEPAM 1 MG/1
1-2 TABLET ORAL EVERY 4 HOURS PRN
Status: DISCONTINUED | OUTPATIENT
Start: 2019-05-27 | End: 2019-06-04 | Stop reason: HOSPADM

## 2019-05-27 RX ORDER — DIPHENHYDRAMINE HYDROCHLORIDE 50 MG/ML
50 INJECTION INTRAMUSCULAR; INTRAVENOUS EVERY 4 HOURS PRN
Status: DISCONTINUED | OUTPATIENT
Start: 2019-05-27 | End: 2019-05-27

## 2019-05-27 RX ORDER — DIPHENHYDRAMINE HYDROCHLORIDE 50 MG/ML
50 INJECTION INTRAMUSCULAR; INTRAVENOUS EVERY 4 HOURS PRN
Status: DISCONTINUED | OUTPATIENT
Start: 2019-05-27 | End: 2019-06-04 | Stop reason: HOSPADM

## 2019-05-27 RX ORDER — LORAZEPAM 1 MG/1
1 TABLET ORAL
Status: DISCONTINUED | OUTPATIENT
Start: 2019-05-27 | End: 2019-06-03

## 2019-05-27 RX ORDER — OLANZAPINE 10 MG/1
10 TABLET, ORALLY DISINTEGRATING ORAL AT BEDTIME
Status: DISCONTINUED | OUTPATIENT
Start: 2019-05-27 | End: 2019-06-04

## 2019-05-27 RX ADMIN — LORAZEPAM 1 MG: 1 TABLET ORAL at 18:26

## 2019-05-27 RX ADMIN — OLANZAPINE 10 MG: 10 TABLET, ORALLY DISINTEGRATING ORAL at 21:21

## 2019-05-27 RX ADMIN — LORAZEPAM 1 MG: 1 TABLET ORAL at 14:17

## 2019-05-27 RX ADMIN — LORAZEPAM 2 MG: 2 INJECTION INTRAMUSCULAR; INTRAVENOUS at 08:47

## 2019-05-27 ASSESSMENT — ACTIVITIES OF DAILY LIVING (ADL)
HYGIENE/GROOMING: PROMPTS
ORAL_HYGIENE: PROMPTS
LAUNDRY: WITH SUPERVISION
HYGIENE/GROOMING: PROMPTS
ORAL_HYGIENE: PROMPTS
DRESS: STREET CLOTHES
LAUNDRY: UNABLE TO COMPLETE
DRESS: SCRUBS (BEHAVIORAL HEALTH)

## 2019-05-27 NOTE — PROGRESS NOTES
Patient appeared agitated at beginning of shift, attempting to say something and became frustrated when she couldn't, was shaking hands and became tearful, writer asked patient if she was having anxiety, patient nodded head. Zyprexa PRN given per protocol. Patient had difficulty understanding how to swallow medication, required redirection. Patient rested for a short time and then got up and again appeared anxious, Hydroxyzine given. Writer attempted to assist patient with eating and drinking fluids, patient requires redirection and is only able to take small sips and eat small amounts of food.     Patient appears disorganized, confused, anxious, appears scared,  occasionally able to speak in full sentences but is unable to explain self fully. Became agitated again later in shift, yelling out, becoming tearful, walking toward exit door as if she was going to attempt to leave, staff had to redirect. Zyprexa 10 mg given again at 2003, Vistaril again at 2158 along with Trazodone, patient eventually calmed and is resting in room, eyes closed. Will continue to monitor for safety.
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
## 2019-05-27 NOTE — PROGRESS NOTES
"Pt had brief moments of speech this shift, but it was almost all incoherent.  Pt appears to be responding to internal stimuli, but she is not able to verbalize what is going on.  Pt's eyes were shifting around her room, as if seeing things, but she was unable to verbalize what was around her.  Pt appears extremely anxious (shaking, picking at skin, tearful).  She was testing handles of emergency exit doors.  When asked what she was doing, she stated \"I'm trying to get help\".  Pt did not know where she was or seem to understand what it meant when told she is in the hospital.  Pt was often tearful, including yelling in her room at times.  Pt had difficulty following RN instructions when taking meds, including spitting med back out at one time on her blanket because it wasn't properly swallowed.     05/26/19 2200   Behavioral Health   Hallucinations appears responding   Thinking confused   Orientation place, disoriented;date, disoriented;time, disoriented;situation, disoriented   Memory   (unable to assess-Pt often not responding to staff)   Insight poor   Judgement impaired   Eye Contact staring   Affect blunted, flat;sad   Mood anxious   Physical Appearance/Attire untidy;disheveled   Hygiene neglected grooming - unclean body, hair, teeth   Suicidality other (see comments)  (Pt not able to answer staff questions.  No SI/SIB noted.)   1. Wish to be Dead   (Pt not able to answer staff questions.  No SI/SIB noted)   2. Non-Specific Active Suicidal Thoughts    (Pt not able to answer staff questions.  No SI/SIB noted)   Self Injury   (Pt not able to answer staff questions.  No SI/SIB noted)   Elopement Trying handles of doors   Activity withdrawn;hyperactive (agitated, impulsive);restless   Speech unable to speak   Medication Sensitivity no observed side effects   Psychomotor / Gait balanced;steady     "

## 2019-05-27 NOTE — PROGRESS NOTES
Pt mostly mute early am when staff approached . She had vomited on the floor. Pt began wailing & shaking when staff left the room while sitting on her bed. She was given ativan Im. Pt continued to yell & cry for a while but calmed with in a half hour or so.Pt was found sitting in front of the toilet with her head hanging over the toilet trying to vomit. Some time later staff found her with her head in the toilet . Staff pulled her head out of the toilet & had her sit on her bed . There was a basin on her bed for her to use . Pt was told once again to use the basin. Staff dried her hair & encouraged her to get in the shower w/ assist from staff, but pt was too disorganized to want to do this. Several hours later she was out in lounge & was able to communicate in short phrases when asked if she would like to eat. She drank a small amount of juice and ate a few cherrios but did not eat the sandwich.  Staff should encourage pt to eat & drink fluids. She was too disorganized to be able to answer whether she was having si/sib thoughts but pt was responding to internal stimulation & pawing at the air & talking to herself.Her partner visited this evening & pt was sleeping when she left. By change of shift she was catatonic again.     05/27/19 1500   Behavioral Health   Hallucinations auditory;visual   Thinking confused;distractable   Orientation date, disoriented;time, disoriented;situation, disoriented   Memory other (see comment)  (unable to determine)   Insight poor;insight appropriate to situation   Judgement impaired   Eye Contact staring;at examiner   Affect blunted, flat;incongruent   Mood anxious   Physical Appearance/Attire untidy;disheveled   Hygiene neglected grooming - unclean body, hair, teeth   Suicidality other (see comments)  (unable to determine mostly mute)   1. Wish to be Dead   (JOSEPH)   Wish to be Dead Description   (JOSEPH)   Self Injury   (JOSEPH)   Elopement   (disorganized ,catonic much of time)   Activity  isolative;withdrawn   Speech unable to speak   Psychomotor / Gait balanced;steady   Psycho Education   Type of Intervention 1:1 intervention   Response participates with encouragement   Hours 0.5   Treatment Detail check in   Activities of Daily Living   Hygiene/Grooming prompts   Oral Hygiene prompts   Dress scrubs (behavioral health)   Laundry with supervision   Room Organization unable

## 2019-05-28 LAB
ALBUMIN SERPL-MCNC: 4.5 G/DL (ref 3.4–5)
ALP SERPL-CCNC: 86 U/L (ref 40–150)
ALT SERPL W P-5'-P-CCNC: 18 U/L (ref 0–50)
ANION GAP SERPL CALCULATED.3IONS-SCNC: 9 MMOL/L (ref 3–14)
AST SERPL W P-5'-P-CCNC: 18 U/L (ref 0–45)
B BURGDOR IGG+IGM SER QL: 0.05 (ref 0–0.89)
BILIRUB SERPL-MCNC: 0.7 MG/DL (ref 0.2–1.3)
BUN SERPL-MCNC: 15 MG/DL (ref 7–30)
CALCIUM SERPL-MCNC: 9.2 MG/DL (ref 8.5–10.1)
CHLORIDE SERPL-SCNC: 105 MMOL/L (ref 94–109)
CHOLEST SERPL-MCNC: 144 MG/DL
CO2 SERPL-SCNC: 25 MMOL/L (ref 20–32)
CREAT SERPL-MCNC: 0.98 MG/DL (ref 0.52–1.04)
FOLATE SERPL-MCNC: 31.1 NG/ML
GFR SERPL CREATININE-BSD FRML MDRD: 83 ML/MIN/{1.73_M2}
GLUCOSE SERPL-MCNC: 91 MG/DL (ref 70–99)
HDLC SERPL-MCNC: 55 MG/DL
HIV 1+2 AB+HIV1 P24 AG SERPL QL IA: NONREACTIVE
LDLC SERPL CALC-MCNC: 75 MG/DL
NONHDLC SERPL-MCNC: 89 MG/DL
POTASSIUM SERPL-SCNC: 3.5 MMOL/L (ref 3.4–5.3)
PROT SERPL-MCNC: 8.8 G/DL (ref 6.8–8.8)
SODIUM SERPL-SCNC: 139 MMOL/L (ref 133–144)
T PALLIDUM AB SER QL: NONREACTIVE
TRIGL SERPL-MCNC: 71 MG/DL
VIT B12 SERPL-MCNC: 537 PG/ML (ref 193–986)

## 2019-05-28 PROCEDURE — 99232 SBSQ HOSP IP/OBS MODERATE 35: CPT | Performed by: NURSE PRACTITIONER

## 2019-05-28 PROCEDURE — 86780 TREPONEMA PALLIDUM: CPT | Performed by: NURSE PRACTITIONER

## 2019-05-28 PROCEDURE — 25000132 ZZH RX MED GY IP 250 OP 250 PS 637: Performed by: NURSE PRACTITIONER

## 2019-05-28 PROCEDURE — 82175 ASSAY OF ARSENIC: CPT | Performed by: NURSE PRACTITIONER

## 2019-05-28 PROCEDURE — 12400001 ZZH R&B MH UMMC

## 2019-05-28 PROCEDURE — 87389 HIV-1 AG W/HIV-1&-2 AB AG IA: CPT | Performed by: NURSE PRACTITIONER

## 2019-05-28 PROCEDURE — 82607 VITAMIN B-12: CPT | Performed by: NURSE PRACTITIONER

## 2019-05-28 PROCEDURE — 36415 COLL VENOUS BLD VENIPUNCTURE: CPT | Performed by: NURSE PRACTITIONER

## 2019-05-28 PROCEDURE — 83825 ASSAY OF MERCURY: CPT | Performed by: NURSE PRACTITIONER

## 2019-05-28 PROCEDURE — 80061 LIPID PANEL: CPT | Performed by: NURSE PRACTITIONER

## 2019-05-28 PROCEDURE — 25000132 ZZH RX MED GY IP 250 OP 250 PS 637: Performed by: PSYCHIATRY & NEUROLOGY

## 2019-05-28 PROCEDURE — 86618 LYME DISEASE ANTIBODY: CPT | Performed by: NURSE PRACTITIONER

## 2019-05-28 PROCEDURE — 82390 ASSAY OF CERULOPLASMIN: CPT | Performed by: NURSE PRACTITIONER

## 2019-05-28 PROCEDURE — 83655 ASSAY OF LEAD: CPT | Performed by: NURSE PRACTITIONER

## 2019-05-28 PROCEDURE — 82746 ASSAY OF FOLIC ACID SERUM: CPT | Performed by: NURSE PRACTITIONER

## 2019-05-28 PROCEDURE — 80053 COMPREHEN METABOLIC PANEL: CPT | Performed by: NURSE PRACTITIONER

## 2019-05-28 RX ADMIN — OLANZAPINE 10 MG: 10 TABLET, FILM COATED ORAL at 16:06

## 2019-05-28 RX ADMIN — LORAZEPAM 1 MG: 1 TABLET ORAL at 19:58

## 2019-05-28 RX ADMIN — LORAZEPAM 1 MG: 1 TABLET ORAL at 11:51

## 2019-05-28 RX ADMIN — DIPHENHYDRAMINE HYDROCHLORIDE 50 MG: 50 CAPSULE ORAL at 20:57

## 2019-05-28 RX ADMIN — LORAZEPAM 1 MG: 1 TABLET ORAL at 20:57

## 2019-05-28 RX ADMIN — LORAZEPAM 1 MG: 1 TABLET ORAL at 08:16

## 2019-05-28 RX ADMIN — HALOPERIDOL 5 MG: 5 TABLET ORAL at 20:57

## 2019-05-28 RX ADMIN — LORAZEPAM 2 MG: 1 TABLET ORAL at 16:28

## 2019-05-28 RX ADMIN — DIPHENHYDRAMINE HYDROCHLORIDE 50 MG: 50 CAPSULE ORAL at 16:27

## 2019-05-28 RX ADMIN — HYDROXYZINE HYDROCHLORIDE 25 MG: 25 TABLET ORAL at 08:16

## 2019-05-28 RX ADMIN — LORAZEPAM 1 MG: 1 TABLET ORAL at 02:23

## 2019-05-28 ASSESSMENT — ACTIVITIES OF DAILY LIVING (ADL)
ORAL_HYGIENE: PROMPTS
LAUNDRY: UNABLE TO COMPLETE
DRESS: INDEPENDENT
HYGIENE/GROOMING: PROMPTS
ORAL_HYGIENE: PROMPTS
DRESS: INDEPENDENT
HYGIENE/GROOMING: PROMPTS

## 2019-05-28 NOTE — PROGRESS NOTES
Patient was awake for three hours. While awake patient appeared anxious, agitated and disorganized. She intermittently shouted, shaked both upper and lower extremities, and was tearful. Staff stayed with patient and redirected to her room to ensure patient's safety. Patient rambled about many topics, including ones from childhood, missing wife and wanting to know where she was. She was reoriented multiple times that she was in the hospital, where she was getting help. She was administered prn Lorazepam (see MAR). Later, patient appeared to be asleep. Will continue to monitor and update if there are changes.

## 2019-05-28 NOTE — PLAN OF CARE
Pt has continued to be disorganized, confused, redirected multiple times and intrusive with staff and peers. Pt is still tearful at times. Pt's wife came in, brought her lunch and was able to calm pt down. Pt denies SI/SIB and HI. Pt is now in her room napping. Will continue to monitor for safety.

## 2019-05-28 NOTE — PROGRESS NOTES
Pt spent most of the evening in her room. Pt does not speak. No SI/SIB behavior was observed.        05/27/19 2200   Behavioral Health   Hallucinations appears responding   Thinking confused;distractable   Orientation time, disoriented;place, disoriented;date, disoriented;situation, disoriented   Memory other (see comment)  (Unable to determine )   Insight poor   Judgement impaired   Eye Contact staring   Affect blunted, flat   Mood mood is calm   Physical Appearance/Attire attire appropriate to age and situation   Hygiene neglected grooming - unclean body, hair, teeth   Suicidality other (see comments)  (unable to determine )   Self Injury other (see comment)  (no behavior observed )   Elopement   (none observed )   Activity isolative;withdrawn   Speech unable to speak   Medication Sensitivity no observed side effects   Psychomotor / Gait steady;balanced   Psycho Education   Type of Intervention 1:1 intervention   Response observes from a distance   Hours 0.5   Treatment Detail check in    Activities of Daily Living   Hygiene/Grooming prompts   Oral Hygiene prompts   Dress street clothes   Laundry unable to complete   Room Organization unable

## 2019-05-28 NOTE — PROGRESS NOTES
BEHAVIORAL TEAM DISCUSSION    Participants: Ying Palacios CNP; VERITO Salmon; BELKYS Taylor  Progress: Pt is a 18 year old admitted to [facility] 32N on 5/26/2019 ([facility] weekend).  She was admitted on a 72-hour hold through [facility] due to agitation.  She signed in voluntarily on 5/28.  She had not been sleeping.  Utox positive for cannabinoids.  Minimally verbal, agitated, tearful, possible AH.    Continued Stay Criteria/Rationale: needs further assessment  Medical/Physical: Migraines per hx  Precautions:   Behavioral Orders   Procedures     Code 1 - Restrict to Unit     Routine Programming     As clinically indicated     Status 15     Every 15 minutes.     Plan: assess, monitor and stabilize  Rationale for change in precautions or plan: new admission.

## 2019-05-28 NOTE — PROGRESS NOTES
"Pt has been out of her room crying, repeating \"I want to see my wife\" then she will talk nonsensical. Pt was also noted to have called 911. Pt was redirectable to not call 911 anymore. Pt said she was having a difficult time remembering her wife's number. Called pt's wife then transferred the phone call to the reyes phone where pt picked the call up. When I was talking to the pt's wife she was saying that the pt already called her twice today. Writer told pt's wife if she needed to come in before visiting hours that would be ok for today because the pt is very anxious and won't stop asking for her wife.   "

## 2019-05-29 LAB
ARSENIC BLD-MCNC: <10 UG/L (ref 0–12)
LEAD BLDV-MCNC: <2 UG/DL (ref 0–4.9)
MERCURY BLD-MCNC: <2.5 UG/L (ref 0–10)

## 2019-05-29 PROCEDURE — 25000132 ZZH RX MED GY IP 250 OP 250 PS 637: Performed by: PSYCHIATRY & NEUROLOGY

## 2019-05-29 PROCEDURE — 99232 SBSQ HOSP IP/OBS MODERATE 35: CPT | Performed by: PSYCHIATRY & NEUROLOGY

## 2019-05-29 PROCEDURE — 12400001 ZZH R&B MH UMMC

## 2019-05-29 PROCEDURE — 25000132 ZZH RX MED GY IP 250 OP 250 PS 637: Performed by: NURSE PRACTITIONER

## 2019-05-29 RX ADMIN — DIPHENHYDRAMINE HYDROCHLORIDE 50 MG: 50 CAPSULE ORAL at 15:56

## 2019-05-29 RX ADMIN — HALOPERIDOL 5 MG: 5 TABLET ORAL at 15:56

## 2019-05-29 RX ADMIN — HALOPERIDOL 5 MG: 5 TABLET ORAL at 12:04

## 2019-05-29 RX ADMIN — LORAZEPAM 2 MG: 1 TABLET ORAL at 03:01

## 2019-05-29 RX ADMIN — DIPHENHYDRAMINE HYDROCHLORIDE 50 MG: 50 CAPSULE ORAL at 12:04

## 2019-05-29 RX ADMIN — LORAZEPAM 1 MG: 1 TABLET ORAL at 21:03

## 2019-05-29 RX ADMIN — LORAZEPAM 1 MG: 1 TABLET ORAL at 12:04

## 2019-05-29 RX ADMIN — LORAZEPAM 1 MG: 1 TABLET ORAL at 07:56

## 2019-05-29 RX ADMIN — OLANZAPINE 10 MG: 10 TABLET, FILM COATED ORAL at 04:05

## 2019-05-29 RX ADMIN — LORAZEPAM 1 MG: 1 TABLET ORAL at 12:05

## 2019-05-29 RX ADMIN — HYDROXYZINE HYDROCHLORIDE 25 MG: 25 TABLET ORAL at 08:38

## 2019-05-29 RX ADMIN — HALOPERIDOL 5 MG: 5 TABLET ORAL at 07:55

## 2019-05-29 RX ADMIN — LORAZEPAM 1 MG: 1 TABLET ORAL at 15:56

## 2019-05-29 RX ADMIN — OLANZAPINE 10 MG: 10 TABLET, ORALLY DISINTEGRATING ORAL at 21:03

## 2019-05-29 RX ADMIN — DIPHENHYDRAMINE HYDROCHLORIDE 50 MG: 50 CAPSULE ORAL at 07:56

## 2019-05-29 ASSESSMENT — ACTIVITIES OF DAILY LIVING (ADL)
HYGIENE/GROOMING: SHOWER;PROMPTS;WITH ASSISTANCE
LAUNDRY: WITH SUPERVISION
ORAL_HYGIENE: INDEPENDENT
DRESS: INDEPENDENT
DRESS: INDEPENDENT
ORAL_HYGIENE: PROMPTS
LAUNDRY: WITH SUPERVISION
HYGIENE/GROOMING: INDEPENDENT

## 2019-05-29 NOTE — PROGRESS NOTES
SPIRITUAL HEALTH SERVICES  SPIRITUAL ASSESSMENT Progress Note  [facility] ([facility]) [address]     REFERRAL SOURCE: I did visit this morning patient Sheri per Epic consult order. Pt was in her room with the one to one staff. I tried to help pt by listening her reflectively but it hard to understand the pt what exactly needs currently. However, after the staff asked her if she need a prayer and pt was willing for prayer but in the middle of the prayer pt stood up from her bed and came near to me to hold my hands. During my prayer pt was talking not focusing on the prayer and she move around. I observed from this visit that the prayer or the spiritual lu[pport at this time for the pt was not the right time to offer, because she don't understand at all what I am doing in her room. Pt mentioned some names and I asked her what relationship do they have those people with her, but pt didn't respond to my questions. The visit wasn't fruitful because the pt current situations.    PLAN: I will remain open to provide spiritual care for the pt as needed.    Tano Ball M.Div. ([place]), M.Th., D.Min., [facility]  Staff   Pager [phone]

## 2019-05-29 NOTE — PROGRESS NOTES
"   05/29/19 1300   Sleep/Rest/Relaxation   Number of hours napping 2 hours   Behavioral Health   Hallucinations appears responding  (says \"rowena\" is talking to her)   Thinking confused;distractable;poor concentration   Orientation time, disoriented;date, disoriented;place, disoriented;person: oriented   Memory confabulation;new learning, recall loss   Insight poor   Judgement impaired   Eye Contact into space;at examiner   Affect sad;full range affect   Mood anxious;shame/guilt;depressed   Physical Appearance/Attire disheveled   Hygiene other (see comment)  (able to shower with assistance)   Suicidality other (see comments)  (denies, but incoherent/rambling response)   1. Wish to be Dead No   2. Non-Specific Active Suicidal Thoughts  No   Elopement Statements about wanting to leave   Activity restless   Speech flight of ideas;rambling   Activities of Daily Living   Hygiene/Grooming shower;prompts;with assistance   Oral Hygiene prompts   Dress independent   Laundry with supervision   Room Organization prompts     Aj started the shift in a very anxious and tearful mood and was unable to take her medication. Later, after being placed on a one to one, she was in a better mood and was able to take her medication, though she struggled to take them all at once. She did well after being told that the medications would help her with the anxiety she is feeling and that they would help her be stable. She was able to shower but said she felt unsafe alone in the room, so staff helped with showering and shampooing her hair. She put clothes in the washer, brushed her teeth, and ate much of her breakfast. She had many moments of panic but was able to be grounded by being directed to look at staff and told that \"we're not going to worry/think about that right now.\" She will also apologize during her moments of panic, and responds well to hearing \"no one here is mad at you, you don't need to apologize.\"    She is very fixated on " Alana and can often begin to cry and panic when she brings her up. She also reported hearing Alana talking to her and that she can see her in her dreams. She did not attend groups but did attempt some conversation with peers. She can be difficult to redirect due to her confusion but is cooperative with staff.

## 2019-05-29 NOTE — PLAN OF CARE
Problem: Psychotic Symptoms  Goal: Psychotic Symptoms  Description  Signs and symptoms of listed problems will be absent or manageable.  Outcome: Declining    Pt agitated and wandering the reyes at shift change. Pt thinking disorganized and confused. Pt speech hyperverbal, pressured, rambling, and tangential. Pt behavior disruptive to milieu and triggered multiple peers. Writer was able to get pt to walk to room and take PRN Zyprexa PO at 1606, but with difficulty. Pt did not respond to Zyprexa and pt continued to escalate; crying out and talking loudly in the reyes, continuing to cause distress to other pts. Staff redirected pt to her room and encouraged pt to lay down and rest multiple times. Pt continued to be agitated, so writer gave PRN Benadryl 50 mg and Ativan 2 mg by mouth. Pt still needed redirection to stay in bed and rest. Pt finally fell asleep for about 1.5 hours. Writer administered scheduled Ativan upon pt waking.    Pt later escalated again, acting the same as previously mentioned and staff again attempted to redirect and verbally deescalate with little to no success. Enough time had passed where PRN Haldol 5 mg and Benadryl 50 mg PO were administered. Pt only received partial dose of PRN Ativan as scheduled dose had recently been given. Writer held scheduled HS Zyprexa. Staff continued to redirect pt to her room and encouraged pt to rest. Pt finally fell asleep toward the end of the evening and remained asleep for the rest of the shift.

## 2019-05-29 NOTE — PROGRESS NOTES
Continues to appear agitated, disorganized, rambling, tangential and paranoid.  Given Ativan 2 mg po @ 0300.  It doesn't appear to be beneficial.

## 2019-05-30 LAB — CERULOPLASMIN SERPL-MCNC: 27 MG/DL (ref 23–53)

## 2019-05-30 PROCEDURE — 25000132 ZZH RX MED GY IP 250 OP 250 PS 637: Performed by: NURSE PRACTITIONER

## 2019-05-30 PROCEDURE — 12400001 ZZH R&B MH UMMC

## 2019-05-30 RX ADMIN — LORAZEPAM 1 MG: 1 TABLET ORAL at 17:27

## 2019-05-30 RX ADMIN — HALOPERIDOL 5 MG: 5 TABLET ORAL at 08:28

## 2019-05-30 RX ADMIN — LORAZEPAM 1 MG: 1 TABLET ORAL at 08:28

## 2019-05-30 RX ADMIN — DIPHENHYDRAMINE HYDROCHLORIDE 50 MG: 50 CAPSULE ORAL at 08:28

## 2019-05-30 RX ADMIN — LORAZEPAM 1 MG: 1 TABLET ORAL at 13:17

## 2019-05-30 ASSESSMENT — ACTIVITIES OF DAILY LIVING (ADL)
DRESS: INDEPENDENT
HYGIENE/GROOMING: PROMPTS;WITH ASSISTANCE
DRESS: INDEPENDENT
ORAL_HYGIENE: PROMPTS
HYGIENE/GROOMING: PROMPTS
LAUNDRY: UNABLE TO COMPLETE
ORAL_HYGIENE: PROMPTS
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
## 2019-05-30 NOTE — PLAN OF CARE
Pt. appears to be psychotic, staring at staff, very little to no verbalization.  Pt. Is eating about one-half of her food.  Pt. Did have a shower with staff's assistance.

## 2019-05-30 NOTE — PROGRESS NOTES
Pt spent majority of the shift. In her room. Pt responding to auditory and visual hallucination. Pt did not eat dinner. Pt eat snacks. Pt did not receive any visits during shift. Pt is redirectable.      05/29/19 0141   Behavioral Health   Hallucinations auditory;appears responding;visual   Thinking poor concentration;distractable;confused   Orientation person: oriented   Memory baseline memory   Insight insight appropriate to situation   Judgement impaired   Eye Contact at examiner   Affect sad   Mood anxious;depressed;hopeless;shame/guilt   Physical Appearance/Attire untidy   Hygiene neglected grooming - unclean body, hair, teeth   Suicidality other (see comments)  (JOSEPH)   1. Wish to be Dead No   2. Non-Specific Active Suicidal Thoughts  No   Self Injury other (see comment)  (JOSEPH)   Activity restless   Speech rambling   Psychomotor / Gait balanced;steady   Activities of Daily Living   Hygiene/Grooming independent   Oral Hygiene independent   Dress independent   Laundry with supervision   Room Organization independent

## 2019-05-31 PROCEDURE — 25000132 ZZH RX MED GY IP 250 OP 250 PS 637: Performed by: NURSE PRACTITIONER

## 2019-05-31 PROCEDURE — 25000132 ZZH RX MED GY IP 250 OP 250 PS 637: Performed by: PSYCHIATRY & NEUROLOGY

## 2019-05-31 PROCEDURE — 99232 SBSQ HOSP IP/OBS MODERATE 35: CPT | Performed by: PSYCHIATRY & NEUROLOGY

## 2019-05-31 PROCEDURE — G0177 OPPS/PHP; TRAIN & EDUC SERV: HCPCS

## 2019-05-31 PROCEDURE — 12400001 ZZH R&B MH UMMC

## 2019-05-31 RX ADMIN — OLANZAPINE 10 MG: 10 TABLET, ORALLY DISINTEGRATING ORAL at 21:56

## 2019-05-31 RX ADMIN — LORAZEPAM 1 MG: 1 TABLET ORAL at 08:57

## 2019-05-31 RX ADMIN — LORAZEPAM 1 MG: 1 TABLET ORAL at 11:51

## 2019-05-31 RX ADMIN — LORAZEPAM 2 MG: 1 TABLET ORAL at 14:18

## 2019-05-31 RX ADMIN — HALOPERIDOL 5 MG: 5 TABLET ORAL at 08:56

## 2019-05-31 RX ADMIN — DIPHENHYDRAMINE HYDROCHLORIDE 50 MG: 50 CAPSULE ORAL at 08:57

## 2019-05-31 RX ADMIN — LORAZEPAM 1 MG: 1 TABLET ORAL at 18:20

## 2019-05-31 ASSESSMENT — ACTIVITIES OF DAILY LIVING (ADL)
LAUNDRY: WITH SUPERVISION
HYGIENE/GROOMING: HANDWASHING;SHOWER;INDEPENDENT
ORAL_HYGIENE: INDEPENDENT
DRESS: SCRUBS (BEHAVIORAL HEALTH);INDEPENDENT
ORAL_HYGIENE: INDEPENDENT
DRESS: INDEPENDENT
HYGIENE/GROOMING: INDEPENDENT
LAUNDRY: UNABLE TO COMPLETE

## 2019-05-31 NOTE — PROGRESS NOTES
Patient denies SI and SIB but states she is depressed. Patients actions are very slow and gait is slow as well. Patient states she is close to her wife and looks farward to seeing her soon. She says she wants to go home but could not elaborate on it. Patient ended check in by just rolling over in the middle of her thought and went to sleep.

## 2019-05-31 NOTE — PROGRESS NOTES
Initiated and actively participated in [employer] group with  1:1 staff present. Able to follow 1-2 step verbal directions with good attention to details and planning of simple task. Engaged in conversation regarding her musical talents, positive benefits and her wife. Social with peers and staff.  Conversation was appropriate to topic. Attentive to task x 30 minutes.  Will continue to encourage and support consistent group attendance and participation.

## 2019-05-31 NOTE — PROGRESS NOTES
Pt appears much more organized and less labile today, and overall calmer. Pt reports feeling better, has some anxiety. Pt showered and changed her linens today, no behavioral concerns to be noted this shift.       05/31/19 1225   Behavioral Health   Hallucinations denies / not responding to hallucinations   Thinking poor concentration   Orientation person: oriented;place: oriented;date: oriented;time: oriented   Memory baseline memory   Insight poor;admits / accepts   Judgement impaired   Eye Contact at examiner   Affect blunted, flat   Mood anxious   Physical Appearance/Attire disheveled   Hygiene well groomed   Suicidality other (see comments)  (denies)   1. Wish to be Dead No   2. Non-Specific Active Suicidal Thoughts  No   Self Injury other (see comment)  (denies)   Elopement   (none)   Activity restless   Speech clear;coherent   Psychomotor / Gait balanced;steady   Psycho Education   Type of Intervention 1:1 intervention   Response participates, initiates socially appropriate   Hours 0.5   Treatment Detail check in   Activities of Daily Living   Hygiene/Grooming independent   Oral Hygiene independent   Dress independent   Laundry unable to complete   Room Organization independent

## 2019-06-01 PROCEDURE — 25000132 ZZH RX MED GY IP 250 OP 250 PS 637: Performed by: NURSE PRACTITIONER

## 2019-06-01 PROCEDURE — 12400001 ZZH R&B MH UMMC

## 2019-06-01 PROCEDURE — 25000132 ZZH RX MED GY IP 250 OP 250 PS 637: Performed by: PSYCHIATRY & NEUROLOGY

## 2019-06-01 RX ADMIN — OLANZAPINE 10 MG: 10 TABLET, FILM COATED ORAL at 15:42

## 2019-06-01 RX ADMIN — LORAZEPAM 1 MG: 1 TABLET ORAL at 12:40

## 2019-06-01 RX ADMIN — HALOPERIDOL 5 MG: 5 TABLET ORAL at 07:42

## 2019-06-01 RX ADMIN — HYDROXYZINE HYDROCHLORIDE 25 MG: 25 TABLET ORAL at 12:40

## 2019-06-01 RX ADMIN — LORAZEPAM 1 MG: 1 TABLET ORAL at 07:43

## 2019-06-01 RX ADMIN — OLANZAPINE 10 MG: 10 TABLET, ORALLY DISINTEGRATING ORAL at 21:12

## 2019-06-01 RX ADMIN — LORAZEPAM 1 MG: 1 TABLET ORAL at 07:42

## 2019-06-01 RX ADMIN — LORAZEPAM 1 MG: 1 TABLET ORAL at 17:05

## 2019-06-01 ASSESSMENT — ACTIVITIES OF DAILY LIVING (ADL)
TOILETING: 0-->INDEPENDENT
RETIRED_EATING: 0-->INDEPENDENT
COGNITION: 0 - NO COGNITION ISSUES REPORTED
LAUNDRY: UNABLE TO COMPLETE
AMBULATION: 0-->INDEPENDENT
RETIRED_COMMUNICATION: 0-->UNDERSTANDS/COMMUNICATES WITHOUT DIFFICULTY
FALL_HISTORY_WITHIN_LAST_SIX_MONTHS: NO
TRANSFERRING: 0-->INDEPENDENT
SWALLOWING: 0-->SWALLOWS FOODS/LIQUIDS WITHOUT DIFFICULTY
DRESS: 0-->INDEPENDENT
DRESS: SCRUBS (BEHAVIORAL HEALTH)
BATHING: 0-->INDEPENDENT
ORAL_HYGIENE: INDEPENDENT
HYGIENE/GROOMING: HANDWASHING;SHOWER;INDEPENDENT

## 2019-06-01 NOTE — PROGRESS NOTES
06/01/19 1500   Behavioral Health   Hallucinations denies / not responding to hallucinations   Thinking confused   Orientation person: oriented;place: oriented   Memory baseline memory   Insight poor   Judgement impaired   Eye Contact at examiner   Affect blunted, flat   Physical Appearance/Attire attire appropriate to age and situation   Hygiene well groomed   Suicidality other (see comments)  (none stated )   1. Wish to be Dead No   2. Non-Specific Active Suicidal Thoughts  No   3. Active Sucidal Ideation with any Methods (Not Plan) Without Intent to Act  No   Self Injury other (see comment)  (none stated )   Activity withdrawn   Speech clear;coherent   Medication Sensitivity no stated side effects   Psychomotor / Gait balanced;steady   Activities of Daily Living   Hygiene/Grooming handwashing;shower;independent   Oral Hygiene independent   Dress scrubs (behavioral health)   Laundry unable to complete   Room Organization independent     PT. Appeared to be calm and pleasant. PT. Spent time out in the lounge with peers. At times pt. Appeared withdrawal. Pt. Had a visit from wife. PT. Told staff that day was amazing. She understands that she is her for help and that staff is here to help. NO sign of SI or SIB.

## 2019-06-01 NOTE — PROGRESS NOTES
Pt was isolated in her room the majority of the evening. Pt memory appeared confused. Pt appeared less disorganized and emotional this evening. Pt reported feeling depressed this evening. Pt denies SI and SIB.          05/31/19 2240   Behavioral Health   Hallucinations denies / not responding to hallucinations   Thinking confused   Orientation place: oriented   Memory baseline memory   Insight poor   Judgement impaired   Eye Contact at examiner   Affect blunted, flat   Mood depressed   Physical Appearance/Attire attire appropriate to age and situation   Hygiene well groomed   Suicidality other (see comments)  (Denies)   1. Wish to be Dead No   2. Non-Specific Active Suicidal Thoughts  No   3. Active Sucidal Ideation with any Methods (Not Plan) Without Intent to Act  No   4. Active Suicidal Ideation with Some Intent to Act, Without Specific Plan  No   5. Active Suicidal Ideation with Specific Plan and Intent  No   Self Injury other (see comment)  (None Observed)   Activity withdrawn;isolative   Speech clear;coherent   Medication Sensitivity no stated side effects;no observed side effects   Psychomotor / Gait balanced;steady   Coping/Psychosocial   Verbalized Emotional State depression   Activities of Daily Living   Hygiene/Grooming handwashing;shower;independent   Oral Hygiene independent   Dress scrubs (behavioral health);independent   Laundry with supervision   Room Organization independent

## 2019-06-01 NOTE — PROGRESS NOTES
[facility], [place]   Psychiatric Progress Note  Sheri Fuller  [id number]  05/31/19    Chief Complaint: Continued medical care          Interim History:   The patient's care was discussed with the treatment team during the daily team meeting and/or staff's chart notes were reviewed.  Staff report patient has been psychotic staring and slept about 7 hours.    Upon visiting describes self is doing okay and has more clear thoughts.  Is able to coherently describe relationship with wife and aware they reside.  Again talks about concerns over having multiple sclerosis in the past and does not mention any side effects from current medications.  No paranoia or hallucinations but does have attention and cognitive function problems.  Additionally has some memory deficits.  Denies any thoughts of harming self or others any sleep problems or appetite issues or guilty or grandiose ideas.  Does not mention any special valiente.    Later attempts to meet with me and seems more psychotic and delusional it distracted and has some possible thought blocking.  Is improved enough to be off of one-to-one status however might not be fully improved for discharge yet.         Medications:       LORazepam  1 mg Oral TID     OLANZapine zydis  10 mg Oral At Bedtime          Allergies:   No Known Allergies       Labs:   No results found for this or any previous visit (from the past 24 hour(s)).       Psychiatric Examination:     /81   Pulse 108   Temp 98  F (36.7  C) (Oral)   Resp 16   Wt 61.1 kg (134 lb 12.8 oz)   SpO2 98%   BMI 21.76 kg/m    Weight is 134 lbs 12.8 oz  Body mass index is 21.76 kg/m .                Sitting Orthostatic BP: 120/82      Sitting Orthostatic Pulse: 132 bpm      Standing Orthostatic BP: 103/73      Standing Orthostatic Pulse: 136 bpm       Weight over time:  Vitals:    05/28/19 1026 05/30/19 0700   Weight: 61.7 kg (136 lb) 61.1 kg (134 lb 12.8 oz)       Orthostatic Vitals        Most Recent      Sitting Orthostatic /82 05/31 0851    Sitting Orthostatic Pulse (bpm) 132 05/31 0851    Standing Orthostatic /73 05/31 0851    Standing Orthostatic Pulse (bpm) 136 05/31 0851            Cardiometabolic risk assessment. 05/31/19      Reviewed patient profile for cardiometabolic risk factors    Date taken /Value  REFERENCE RANGE   Abdominal Obesity  (Waist Circumference)   See nursing flowsheet Women ?35 in (88 cm)   Men ?40 in (102 cm)      Triglycerides  Triglycerides   Date Value Ref Range Status   05/28/2019 71 <150 mg/dL Final       ?150 mg/dL (1.7 mmol/L) or current treatment for elevated triglycerides   HDL cholesterol  HDL Cholesterol   Date Value Ref Range Status   05/28/2019 55 >49 mg/dL Final   ]   Women <50 mg/dL (1.3 mmol/L) in women or current treatment for low HDL cholesterol  Men <40 mg/dL (1 mmol/L) in men or current treatment for low HDL cholesterol     Fasting plasma glucose (FPG) Lab Results   Component Value Date    GLC 91 05/28/2019      FPG ?100 mg/dL (5.6 mmol/L) or treatment for elevated blood glucose   Blood pressure  BP Readings from Last 3 Encounters:   05/27/19 117/81   05/26/19 109/70   05/07/19 102/66    Blood pressure ?130/85 mmHg or treatment for elevated blood pressure   Family History  See family history         Aj is a 18-year-old biologically female prefers male with black short hair.  Speech is simplistic and of an appropriate rate and tone.  Behavior is walking about the room.  Affect is labile.  Mood is okay.  Thought content consists of the above without thoughts of harming self or others and possible delusions.  Thought process is disorganized with looseness of association.  May have abnormal perceptions.  Is alert and aware of current location but not completely of circumstances.  Attention and concentration is impaired.  Cognition and fund of knowledge is currently impaired.  Long-term/short-term/remote memory is currently impaired.   Insight and judgment are both impaired.       Precautions:     Behavioral Orders   Procedures     Code 1 - Restrict to Unit     Routine Programming     As clinically indicated     Status 15     Every 15 minutes.          DIagnoses:     Unspecified psychosis  Rule out bipolar 1 disorder  Rule out catatonia secondary to psychosis  History of OCD  History of autism spectrum disorder  History of posttraumatic stress disorder  History of cannabis use         Assessment & Plan:     Aj continues to be distracted but is not as disorganized today in general.  Likely still has psychotic symptoms but seem to be less frequent and seems to be less distraught and delusional.  Is now off of a one-to-one.  We discussed how he would like to continue current treatment unchanged and that perhaps further stabilization occurs discharge could occur next week.    Continue voluntary hospitalization holdable    The risks, benefits, alternatives and side effects have been discussed and are understood by the patient and other caregivers.      Jorden Vargas  [facility]iat[facility]      The following document has been created with voice recognition software and may contain unintentional word substitutions.    Non clinically relevant CMS requirements:  Clinical Global Impressions  First:  Considering your total clinical experience with this particular patient population, how severe are the patient's symptoms at this time?: 7 (05/27/19 0838)  Compared to the patient's condition at the START of treatment, this patient's condition is:: 4 (05/27/19 0838)  Most recent:  Considering your total clinical experience with this particular patient population, how severe are the patient's symptoms at this time?: 7 (05/27/19 0838)  Compared to the patient's condition at the START of treatment, this patient's condition is:: 4 (05/27/19 0838)

## 2019-06-02 PROCEDURE — 25000132 ZZH RX MED GY IP 250 OP 250 PS 637: Performed by: PSYCHIATRY & NEUROLOGY

## 2019-06-02 PROCEDURE — 12400001 ZZH R&B MH UMMC

## 2019-06-02 PROCEDURE — 25000132 ZZH RX MED GY IP 250 OP 250 PS 637: Performed by: NURSE PRACTITIONER

## 2019-06-02 RX ADMIN — LORAZEPAM 1 MG: 1 TABLET ORAL at 11:39

## 2019-06-02 RX ADMIN — LORAZEPAM 2 MG: 1 TABLET ORAL at 16:04

## 2019-06-02 RX ADMIN — HALOPERIDOL 5 MG: 5 TABLET ORAL at 11:41

## 2019-06-02 RX ADMIN — HALOPERIDOL 5 MG: 5 TABLET ORAL at 08:29

## 2019-06-02 RX ADMIN — OLANZAPINE 10 MG: 10 TABLET, FILM COATED ORAL at 09:45

## 2019-06-02 RX ADMIN — DIPHENHYDRAMINE HYDROCHLORIDE 50 MG: 50 CAPSULE ORAL at 08:29

## 2019-06-02 RX ADMIN — OLANZAPINE 10 MG: 10 TABLET, FILM COATED ORAL at 15:02

## 2019-06-02 RX ADMIN — DIPHENHYDRAMINE HYDROCHLORIDE 50 MG: 50 CAPSULE ORAL at 11:41

## 2019-06-02 RX ADMIN — LORAZEPAM 1 MG: 1 TABLET ORAL at 11:40

## 2019-06-02 RX ADMIN — LORAZEPAM 1 MG: 1 TABLET ORAL at 08:28

## 2019-06-02 RX ADMIN — HALOPERIDOL 5 MG: 5 TABLET ORAL at 16:04

## 2019-06-02 RX ADMIN — DIPHENHYDRAMINE HYDROCHLORIDE 50 MG: 50 CAPSULE ORAL at 16:04

## 2019-06-02 RX ADMIN — HYDROXYZINE HYDROCHLORIDE 25 MG: 25 TABLET ORAL at 11:40

## 2019-06-02 ASSESSMENT — ACTIVITIES OF DAILY LIVING (ADL)
LAUNDRY: WITH SUPERVISION
DRESS: INDEPENDENT
HYGIENE/GROOMING: INDEPENDENT
LAUNDRY: WITH SUPERVISION
ORAL_HYGIENE: INDEPENDENT
ORAL_HYGIENE: INDEPENDENT
HYGIENE/GROOMING: INDEPENDENT
DRESS: INDEPENDENT

## 2019-06-02 NOTE — PROGRESS NOTES
Pt was visible in the milieu today, restless and has difficulty concentrating. Pt stated she feels anxious, depressed, and is eager to be discharged. Pt interacts and is social with staff, but overall withdrawn from peers and is unable to focus on tasks.        06/02/19 1306   Behavioral Health   Hallucinations denies / not responding to hallucinations   Thinking poor concentration;distractable;confused   Orientation person: oriented;place: oriented   Memory baseline memory   Insight admits / accepts   Judgement impaired   Eye Contact at examiner   Affect blunted, flat   Mood anxious   Physical Appearance/Attire disheveled   Hygiene well groomed   Suicidality other (see comments)  (denies)   1. Wish to be Dead No   2. Non-Specific Active Suicidal Thoughts  No   Self Injury other (see comment)  (denies)   Elopement   (none)   Activity restless   Speech clear;coherent   Psychomotor / Gait steady;balanced   Psycho Education   Type of Intervention 1:1 intervention   Response participates, initiates socially appropriate   Hours 0.5   Treatment Detail check in   Activities of Daily Living   Hygiene/Grooming independent   Oral Hygiene independent   Dress independent   Laundry with supervision   Room Organization independent

## 2019-06-03 PROCEDURE — 12400001 ZZH R&B MH UMMC

## 2019-06-03 PROCEDURE — 25000132 ZZH RX MED GY IP 250 OP 250 PS 637: Performed by: NURSE PRACTITIONER

## 2019-06-03 PROCEDURE — 99232 SBSQ HOSP IP/OBS MODERATE 35: CPT | Performed by: PSYCHIATRY & NEUROLOGY

## 2019-06-03 PROCEDURE — 25000132 ZZH RX MED GY IP 250 OP 250 PS 637: Performed by: PSYCHIATRY & NEUROLOGY

## 2019-06-03 RX ORDER — LORAZEPAM 0.5 MG/1
0.5 TABLET ORAL
Status: DISCONTINUED | OUTPATIENT
Start: 2019-06-04 | End: 2019-06-04 | Stop reason: HOSPADM

## 2019-06-03 RX ADMIN — HYDROXYZINE HYDROCHLORIDE 25 MG: 25 TABLET ORAL at 18:27

## 2019-06-03 RX ADMIN — OLANZAPINE 10 MG: 10 TABLET, FILM COATED ORAL at 05:00

## 2019-06-03 RX ADMIN — LORAZEPAM 1 MG: 1 TABLET ORAL at 12:00

## 2019-06-03 RX ADMIN — OLANZAPINE 10 MG: 10 TABLET, ORALLY DISINTEGRATING ORAL at 22:20

## 2019-06-03 RX ADMIN — LORAZEPAM 1 MG: 1 TABLET ORAL at 07:51

## 2019-06-03 RX ADMIN — OLANZAPINE 10 MG: 10 TABLET, FILM COATED ORAL at 12:17

## 2019-06-03 RX ADMIN — LORAZEPAM 1 MG: 1 TABLET ORAL at 17:38

## 2019-06-03 ASSESSMENT — ACTIVITIES OF DAILY LIVING (ADL)
DRESS: SCRUBS (BEHAVIORAL HEALTH)
HYGIENE/GROOMING: HANDWASHING;SHOWER;INDEPENDENT
ORAL_HYGIENE: INDEPENDENT
HYGIENE/GROOMING: INDEPENDENT
ORAL_HYGIENE: INDEPENDENT
DRESS: STREET CLOTHES;INDEPENDENT
LAUNDRY: WITH SUPERVISION

## 2019-06-03 NOTE — PLAN OF CARE
Problem: Psychotic Symptoms  Goal: Psychotic Symptoms  Description  Signs and symptoms of listed problems will be absent or manageable.  6/2/2019 2247 by Haresh Perez RN  Outcome: No Change  6/2/2019 2245 by Haresh Perez RN  Outcome: No Change     Problem: General Plan of Care (Inpatient Behavioral)  Goal: Individualization/Patient Specific Goal (IP Behavioral)  Description  The patient and/or their representative will achieve their patient-specific goals related to the plan of care.    The patient-specific goals include:  6/2/2019 2247 by Haresh Perez RN  Outcome: No Change  6/2/2019 2245 by Haresh Perez RN  Outcome: No Change   Pt was visible in the milieu earlier in the shift, started talking, not making any sense and redirected to room after B52 oral given x 1. Pt was very unsteady walking to dinner area at about 1800. Scheduled Ativan held. Pt eat about 75% of her dinner, had Salad brought in by her partner and went to bed at about 0750. Olanzapine due for 2200 held, pt sleeping, no distress observed and allowed to rest. No psychosis observed this shift.

## 2019-06-03 NOTE — PROGRESS NOTES
"CLINICAL NUTRITION SERVICES - ASSESSMENT NOTE     Nutrition Prescription    Malnutrition Status:    Patient does not meet two of the above criteria necessary for diagnosing malnutrition    Future/Additional Recommendations:  No nutrition follow-up warranted at this time. RD to sign-off. Please page/consult if further needs arise.     REASON FOR ASSESSMENT  Sheri Fuller is a/an 18 year old adult assessed by the dietitian for Admission Nutrition Risk Screen for reduced oral intake over the last month    NUTRITION HISTORY  Pt state she typically eats 2 big meals per day. She reports eating mostly \"junk food.\" States she likes pizza, burgers, french fries, pasta. Pt states she intake will improve as her mental health improves.     CURRENT NUTRITION ORDERS  Diet: Regular  Intake/Tolerance: Noted episode of emesis on 5/27. 5/29 pt noted to have eate breakfast, skipped dinner and ate snacks instead. 5/30 pt noted to be consuming 50% of meals. 6/2 pt noted to be consuming 75% of meals. Pt states her appetite and intake have improved since she was admitted. She was able to consume >70% of her breakfast today and intends on eating meals TID.     LABS  Labs reviewed    MEDICATIONS  Medications reviewed    ANTHROPOMETRICS  Height: 167.6 cm (5' 5\")  Most Recent Weight: 61.3 kg (135 lb 3.2 oz)    IBW: 59.1 kg  BMI: Normal BMI  Weight History: Slight trend down in weight over past 6 months. Pt has lost 7.4 lbs (5.2%) in the past 6 months. This is non-severe weight loss.    Wt Readings from Last 10 Encounters:   06/02/19 61.3 kg (135 lb 3.2 oz)   05/26/19 63 kg (139 lb)   05/07/19 66.7 kg (147 lb)   03/13/19 61.7 kg (136 lb)   02/27/19 64.3 kg (141 lb 11.2 oz)   02/16/19 63.5 kg (140 lb)   02/06/19 63.5 kg (140 lb)   01/24/19 64.7 kg (142 lb 9.6 oz)   12/26/18 62.9 kg (138 lb 9.6 oz)   12/05/18 64.8 kg (142 lb 14.4 oz)       Dosing Weight: 61 kg (actual)    ASSESSED NUTRITION NEEDS  Estimated Energy Needs: 7958-0270 kcals/day (25 " - 30 kcals/kg)  Justification: Maintenance  Estimated Protein Needs: 49-61 grams protein/day (0.8 - 1 grams of pro/kg)  Justification: Maintenance  Estimated Fluid Needs: 7229-9462 mL/day (1 mL/kcal)   Justification: Maintenance    PHYSICAL FINDINGS  See malnutrition section below.    MALNUTRITION  % Intake: Decreased intake does not meet criteria  % Weight Loss: Weight loss does not meet criteria  Subcutaneous Fat Loss: None observed  Muscle Loss: None observed  Fluid Accumulation/Edema: None noted  Malnutrition Diagnosis: Patient does not meet two of the above criteria necessary for diagnosing malnutrition    NUTRITION DIAGNOSIS  No nutrition diagnosis at this time.     INTERVENTIONS  Implementation  Nutrition education for nutrition relationship to health/disease - Discussed the importance of consistent adequate intake. Encouraged pt to consume well balanced meals, including protein, grains, fruits and vegetables. Reviewed role and availability of RD should appetite/intake decline or if other nutritional concerns arise.     Goals  Patient to consume % of nutritionally adequate meal trays TID, or the equivalent with supplements/snacks.     Monitoring/Evaluation  No nutrition follow-up warranted at this time. RD to sign-off. Please consult if further needs arise.    Viridiana Granados RD, LD  Pager: [phone]

## 2019-06-03 NOTE — PROGRESS NOTES
Pt napped for a couple hours on two separate periods.  Pt ate some breakfast and some lunch that was brought in by visitor (rowena).  Pt was very labile and needy.  Pt was very difficult to understand because of being tangential and rambling.   Pt was redirected to room many times, given PRN's when available, other self soothing items.  Pt seems to have regressed some.         06/03/19 1441   Sleep/Rest/Relaxation   Day/Evening Time Hours napping;resting in bed   Number of hours napping 3 hours   Behavioral Health   Hallucinations denies / not responding to hallucinations   Thinking distractable;poor concentration   Orientation person, disoriented;situation, disoriented   Memory short term   Insight poor   Judgement impaired   Eye Contact at examiner   Affect blunted, flat   Mood anxious;labile   Physical Appearance/Attire untidy;attire appropriate to age and situation   Hygiene well groomed   Suicidality other (see comments)  (denies)   1. Wish to be Dead No   2. Non-Specific Active Suicidal Thoughts  No   Activity hyperactive (agitated, impulsive);restless   Speech incoherent;rambling;tangential   Psychomotor / Gait balanced;steady   Coping/Psychosocial   Verbalized Emotional State anxiety;depression;frustration   Psycho Education   Type of Intervention 1:1 intervention   Response other (see comment)  (unable to attend due to mood instability/attention)   Activities of Daily Living   Hygiene/Grooming handwashing;shower;independent   Oral Hygiene independent   Dress street clothes;independent   Activity   Activity Assistance Provided independent

## 2019-06-03 NOTE — PROGRESS NOTES
Approached pt to sign two AMIRAH's to her providers.  Process took 20 minutes given pt's disorganized state.  Her mood alternated between happy and sad with quick shift.   AMIRAH's filed in chart.
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
## 2019-06-04 VITALS
HEART RATE: 118 BPM | WEIGHT: 133.7 LBS | OXYGEN SATURATION: 98 % | SYSTOLIC BLOOD PRESSURE: 113 MMHG | BODY MASS INDEX: 21.58 KG/M2 | DIASTOLIC BLOOD PRESSURE: 79 MMHG | RESPIRATION RATE: 16 BRPM | TEMPERATURE: 97.1 F

## 2019-06-04 PROCEDURE — 25000132 ZZH RX MED GY IP 250 OP 250 PS 637: Performed by: PSYCHIATRY & NEUROLOGY

## 2019-06-04 PROCEDURE — 99239 HOSP IP/OBS DSCHRG MGMT >30: CPT | Performed by: PSYCHIATRY & NEUROLOGY

## 2019-06-04 PROCEDURE — G0177 OPPS/PHP; TRAIN & EDUC SERV: HCPCS

## 2019-06-04 RX ORDER — OLANZAPINE 15 MG/1
15 TABLET ORAL AT BEDTIME
Status: DISCONTINUED | OUTPATIENT
Start: 2019-06-04 | End: 2019-06-04 | Stop reason: HOSPADM

## 2019-06-04 RX ORDER — OLANZAPINE 15 MG/1
15 TABLET ORAL AT BEDTIME
Qty: 30 TABLET | Refills: 0 | Status: SHIPPED | OUTPATIENT
Start: 2019-06-04 | End: 2019-06-20

## 2019-06-04 RX ORDER — LORAZEPAM 0.5 MG/1
0.5 TABLET ORAL 3 TIMES DAILY
Qty: 90 TABLET | Refills: 0 | Status: SHIPPED | OUTPATIENT
Start: 2019-06-04 | End: 2019-06-20

## 2019-06-04 RX ADMIN — HYDROXYZINE HYDROCHLORIDE 25 MG: 25 TABLET ORAL at 08:33

## 2019-06-04 RX ADMIN — LORAZEPAM 0.5 MG: 0.5 TABLET ORAL at 08:33

## 2019-06-04 RX ADMIN — LORAZEPAM 0.5 MG: 0.5 TABLET ORAL at 11:13

## 2019-06-04 NOTE — PROGRESS NOTES
Pt discharged to home. Pt's wife was here to pick her up. discharge instructions gone over and papers were signed. Pt and wife will  her prescriptions. Hard copy of lorazepam was given. Pt denies SI/SIB and HI.

## 2019-06-04 NOTE — PROGRESS NOTES
BEHAVIORAL TEAM DISCUSSION    Participants: Dr. Saunders, Shannen Schumacher, VERITO; BELKYS Taylor  Progress: pt remains labile in mood.  She has require considerable staff intervention and multiple redirections.  Agitation and mournful crying were evident throughout the day yesterday.    Continued Stay Criteria/Rationale: pt continues to be appropriate for acute care. Medication adjustments continue.   Medical/Physical: Migraines per hx  Precautions:   Behavioral Orders   Procedures     Code 1 - Restrict to Unit     Routine Programming     As clinically indicated     Status 15     Every 15 minutes.     Plan: assess, monitor and stabilize    Rationale for change in precautions or plan: minimal change.

## 2019-06-04 NOTE — DISCHARGE INSTRUCTIONS
Behavioral Discharge Planning and Instructions      Summary:    You were admitted on 5/26/2019  due to Disorganized Thinking/Behaviors.  You were treated by Ying Palacios NP and Dr. Saunders and discharged on 6/4/2019 from [facility]      Principal Diagnosis:     Unspecified psychosis, rule out bipolar disorder disorder type 1, manic, severe with psychosis  Possible catatonia  History of PTSD  History of OCD  History of autism spectrum disorder  Possible cannabis use disorder  Migraines      Health Care Follow-up Appointments:     PCP - Dr. Saibna Hogan - [facility]    Follow up appointments on Amita 10 at 2pm with the Lorin Armando PharmD and @ 2:20pm with Dr Lorena Khoury MD   Appointment with Dr Michele (psychologist)  June 13 @ 3pm  [facility]  Address: [address] [place] [address]  Phone: [phone]        Therapy - STEVEN Cordova - [facility]  Appointment scheduled  on 6/6/2019 @ 6pm (other options are 2:30pm or 5pm)  ?[facility]  [address]  [address] [phone] (fax)      Attend all scheduled appointments with your outpatient providers. Call at least 24 hours in advance if you need to reschedule an appointment to ensure continued access to your outpatient providers.   Major Treatments, Procedures and Findings:  You were provided with: a psychiatric assessment, assessed for medical stability, medication evaluation and/or management and milieu management    Symptoms to Report: increased confusion, losing more sleep, mood getting worse or thoughts of suicide    Early warning signs can include: increased depression or anxiety sleep disturbances increased thoughts or behaviors of suicide or self-harm  increased unusual thinking, such as paranoia or hearing voices    Safety and Wellness:  Take all medicines as directed.  Make no changes unless your doctor suggests them.  Follow treatment  "recommendations.  Refrain from alcohol and non-prescribed drugs.  If there is a concern for safety, call 911.    Resources:   Crisis Intervention: 102.289.2573 or 027-381-0691 (TTY: 591.333.5430).  Call anytime for help.  National [place] on Mental Illness (www.[place].nkechi.org): 187.199.7086 or 517-335-7539.  Suicide Awareness Voices of Education (SAVE) (www.save.org): 976-262-SAVE (6952)  Mental Health Association of [place] (www.mentalhealth.org): 203.987.8977 or 560-840-0901  [facility] Crisis (COPE) Response - Adult 020 426-2329  Text 4 Life: txt \"LIFE\" to 34810 for immediate support and crisis intervention  Crisis text line: Text \"[place]\" to 311362. Free, confidential, 24/7.  Crisis Intervention: 977.228.6283 or 177-349-1578. Call anytime for help.       The treatment team has appreciated the opportunity to work with you.     If you have any questions or concerns our unit number is [phone]  You may be receiving a follow-up phone call within the next three days from a representative from behavioral health.    You have identified the best phone number to reach you as [phone]      "

## 2019-06-04 NOTE — PROGRESS NOTES
"   06/03/19 2151   Behavioral Health   Hallucinations appears responding   Thinking paranoid   Orientation person, disoriented;place: oriented;time: oriented   Memory short term   Insight poor   Judgement impaired   Eye Contact at examiner   Affect tense   Mood shame/guilt;grandiose;anxious   Physical Appearance/Attire neat   Hygiene neglected grooming - unclean body, hair, teeth   Suicidality other (see comments)  (None stated)   1. Wish to be Dead No   2. Non-Specific Active Suicidal Thoughts  No   Self Injury other (see comment)  (None stated)   Activity restless   Speech clear;coherent  (She did speak and communicate this evening)   Medication Sensitivity no observed side effects   Psychomotor / Gait balanced;steady   Activities of Daily Living   Hygiene/Grooming independent   Oral Hygiene independent   Dress scrubs (behavioral health)   Laundry with supervision   Room Organization prompts     She paced halls. She came out for snack and then turned around telling writer that she didn't deserve snack after what she did. Staff reassured her that all was well and that she could have a snack. After some persuading she went down to the lounge - writer is unsure whether she had a snack or not. For the beginning of the shift she talked with other patients and staff. She tracked conversation for the most part and was able to answer questions. At dinner time she started crying saying, \"I miss my wife. I miss my wife.\" Writer got another psych associate to come talk with Aj. Shortly afterwards Aj had a visit from Alana. This visit appeared to calm Aj down. Aj slept for the rest of the evening shift after the visit.   "

## 2019-06-04 NOTE — PROGRESS NOTES
06/04/19 1442   Patient Belongings   Did you bring any home meds/supplements to the hospital?  No   Patient Belongings returned to patient at discharge   Belongings Search Yes   Clothing Search Yes   Second Staff ESTHER Taylor     With patient: Clothes  In patient's locker: 2 pairs of pants, 2 shirts, underwear, dry shampoo, lotion  NO CELL PHONE, NO WALLET, NO ID, NO   NOTHING SENT TO SECURITY.    A               Admission:  I am responsible for any personal items that are not sent to the safe or pharmacy.  [facility] is not responsible for loss, theft or damage of any property in my possession.    Signature:  _________________________________ Date: _______  Time: _____                                              Staff Signature:  ____________________________ Date: ________  Time: _____      2nd Staff person, if patient is unable/unwilling to sign:    Signature: ________________________________ Date: ________  Time: _____     Discharge:  [facility] has returned all of my personal belongings:    Signature: _________________________________ Date: ________  Time: _____                                          Staff Signature:  ____________________________ Date: ________  Time: _____

## 2019-06-05 NOTE — DISCHARGE SUMMARY
[facility], [place]   Psychiatric Discharge Summary  Time: 39 minutes on encounter.    Sheri Fuller MRN# [id number]   Age: 18 year old YOB: 2000     Date of Admission:  5/26/2019  Date of Discharge:  06/04/19  Admitting Physician:    Discharge Physician:  Jorden Vargas         Event Leading to Hospitalization and Hospital Course:   Sheri Fuller was admitted for bizarre behavior possible psychotic symptoms possible manic episode.  Was using cannabis oil recently and not sleeping.       See Admission note by Philip on 5/27 for additional details.     Sheri Fuller was hospitalized on a 72-hour hold and sign involuntarily.  Initially it was thought that Aj was extremely psychotic possibly having bipolar disorder with catatonia.  Aj was started on Lorazepam 1 mg 3 times a day and olanzapine 10 mg at night receiving as needed medications of olanzapine.  She was discharged on 15 mg of olanzapine at night and given reduced dosage of Lorazepam to 0.5 mg 3 times a day.  During hospitalization she showed extreme anxiety and possible flashbacks from previous trauma.  According to significant other has frequent trauma and anxiety reactions at baseline.  During hospitalization was labile in terms of mood catatonia had improved and was sleeping more adequately at night with the olanzapine.  Did not seem to experience any side effects and was still suffering from rigidity and severe anxiety on the inpatient unit.  The decision was made to attempt to discharge from the hospital today with significant other.  Still has some times of appearing disorganized however that could be related to extreme anxiety and flashback to previous trauma with disassociation.  Is not having any thoughts of harming self or others any hallucinations or paranoia.  No hopelessness or helplessness does not have any current attention or concentration issues and is alert and oriented to current  circumstances.  Discussed safety planning with patient and significant other.           DIagnoses:   Bipolar 1 disorder  Catatonia secondary to bipolar 1  History of OCD  History of autism spectrum disorder  History of posttraumatic stress disorder  History of cannabis use         Labs:   No results found for this or any previous visit (from the past 24 hour(s)).         Consults:   No consultations were requested during this admission           Discharge Medications:        Review of your medicines      START taking      Dose / Directions   LORazepam 0.5 MG tablet  Commonly known as:  ATIVAN  Used for:  Catatonic schizophrenia (H)      Dose:  0.5 mg  Take 1 tablet (0.5 mg) by mouth 3 times daily  Quantity:  90 tablet  Refills:  0     OLANZapine 15 MG tablet  Commonly known as:  zyPREXA  Used for:  Catatonic schizophrenia (H)      Dose:  15 mg  Take 1 tablet (15 mg) by mouth At Bedtime  Quantity:  30 tablet  Refills:  0        CONTINUE these medicines which have NOT CHANGED      Dose / Directions   minocycline 100 MG capsule  Commonly known as:  MINOCIN/DYNACIN  Used for:  Acne vulgaris, Nodulocystic acne      Dose:  100 mg  Take 1 capsule (100 mg) by mouth 2 times daily  Quantity:  60 capsule  Refills:  3     vitamin D3 1000 units (25 mcg) tablet  Commonly known as:  CHOLECALCIFEROL  Used for:  Other fatigue      Dose:  1000 Units  Take 1 tablet (1,000 Units) by mouth daily  Quantity:  30 tablet  Refills:  11        STOP taking    CHILDRENS SILAPAP 160 MG/5ML liquid  Generic drug:  acetaminophen        clonazePAM 0.5 MG ODT  Commonly known as:  klonoPIN        fluticasone 50 MCG/ACT nasal spray  Commonly known as:  FLONASE        ibuprofen 600 MG tablet  Commonly known as:  ADVIL/MOTRIN              Where to get your medicines      These medications were sent to [place], [place] - [address]  [address] [address]    Phone:  [phone]     OLANZapine 15 MG  tablet     Some of these will need a paper prescription and others can be bought over the counter. Ask your nurse if you have questions.    Bring a paper prescription for each of these medications    LORazepam 0.5 MG tablet              Mental Status Examination:   Aj is a 18-year-old biologically female prefers male with black short hair.  Speech is simplistic and of an appropriate rate and tone.  Behavior is walking about the room.  Affect is labile.  Mood is better.  Thought content consists of the above without thoughts of harming self or others and possible delusions.  Thought process is disorganized with looseness of association.  May have abnormal perceptions.  Is alert and aware of current location but not completely of circumstances.  Attention and concentration is impaired.  Cognition and fund of knowledge is currently impaired.  Long-term/short-term/remote memory is currently impaired.  Insight and judgment are both limited.         Discharge Plan:        Reason for your hospital stay    Psychosis     Activity    Your activity upon discharge: activity as tolerated     Discharge Instructions    1. Please do not harm yourself or others.  2. Please continue to take your medications.  3. Please follow up with your outpatient care team.  4. Please do not take drugs or alcohol as this will worsen your condition.  5. Please do not take more than the prescribed doses of medications as this may make them dangerous.   6. Please follow your safety plan of action.  7. Please call crisis if having trouble.  8. If having thoughts of harming self or others please come in to the emergency department as soon as possible.     Full Code     Diet    Follow this diet upon discharge: Orders Placed This Encounter      Regular Diet Adult           Further instructions from your care team        Behavioral Discharge Planning and Instructions      Summary:    You were admitted on 5/26/2019  due to Disorganized Thinking/Behaviors.   You were treated by Ying Palacios NP and Dr. Saunders and discharged on 6/4/2019 from [facility]      Principal Diagnosis:     Unspecified psychosis, rule out bipolar disorder disorder type 1, manic, severe with psychosis  Possible catatonia  History of PTSD  History of OCD  History of autism spectrum disorder  Possible cannabis use disorder  Migraines      Health Care Follow-up Appointments:     PCP - Dr. Sabina Hogan - [facility]    Follow up appointments on Amita 10 at 2pm with the Lorin Armando PharmD and @ 2:20pm with Dr Lorena Khoury MD   Appointment with Dr Michele (psychologist)  June 13 @ 3pm  [facility]  Address: [address] [place] [id number]  Phone: [phone]        Therapy - STEVEN Cordova - [facility]  Appointment scheduled  on 6/6/2019 @ 6pm (other options are 2:30pm or 5pm)  ?[facility]  [address] [place], [place] [id number] [phone] (fax)      Attend all scheduled appointments with your outpatient providers. Call at least 24 hours in advance if you need to reschedule an appointment to ensure continued access to your outpatient providers.   Major Treatments, Procedures and Findings:  You were provided with: a psychiatric assessment, assessed for medical stability, medication evaluation and/or management and milieu management    Symptoms to Report: increased confusion, losing more sleep, mood getting worse or thoughts of suicide    Early warning signs can include: increased depression or anxiety sleep disturbances increased thoughts or behaviors of suicide or self-harm  increased unusual thinking, such as paranoia or hearing voices    Safety and Wellness:  Take all medicines as directed.  Make no changes unless your doctor suggests them.  Follow treatment recommendations.  Refrain from alcohol and non-prescribed drugs.  If there is a concern for safety, call 911.    Resources:   Crisis  "Intervention: 577.440.7039 or 002-559-7587 (TTY: 546.733.7867).  Call anytime for help.  [place] on Mental Illness (www.[place].nkechi.org): 112.652.4396 or 475-489-0501.  Suicide Awareness Voices of Education (SAVE) (www.save.org): 888-511-SAVE (7283)  Mental Health Association of [place] (www.mentalhealth.org): 291.188.3304 or 958-651-4966  [facility] Crisis (COPE) Response - Adult 256 262-7136  Text 4 Life: txt \"LIFE\" to 35681 for immediate support and crisis intervention  Crisis text line: Text \"[place]\" to 165642. Free, confidential, 24/7.  Crisis Intervention: 444.395.7783 or 043-812-4081. Call anytime for help.       The treatment team has appreciated the opportunity to work with you.     If you have any questions or concerns our unit number is [phone]  You may be receiving a follow-up phone call within the next three days from a representative from behavioral health.    You have identified the best phone number to reach you as [phone]              Please send copy to Sabina Hogan    During hospitalizations patients have perpetuating, complicating, situational, acute, and chronic conditions that lead to risk for suicidality or homicidality. During hospitalizations we mitigate any modifiable risk factors that may have been identified in the history and physical examination and throughout the hospitalization. Chronic non-modifiable risk factors are not able to be changed with acute hospitalization. As a patient that is discharging these risk factors have been modified as much as possible and a supportive network and safety plan has been put in place. Further modifications and assistance will have to be obtained in the outpatient setting and the inpatient hospitalization team is no longer responsible for outcomes.    Jorden Vargas  [facility] Psychiatry      The following document has been created with voice recognition software and may contain unintentional word " substitutions.      Non clinically relevant CMS requirements:  Clinical Global Impressions  First:  Considering your total clinical experience with this particular patient population, how severe are the patient's symptoms at this time?: 7 (05/27/19 0838)  Compared to the patient's condition at the START of treatment, this patient's condition is:: 4 (05/27/19 0838)  Most recent:  Considering your total clinical experience with this particular patient population, how severe are the patient's symptoms at this time?: 3 (06/04/19 1957)  Compared to the patient's condition at the START of treatment, this patient's condition is:: 3 (06/04/19 1957)

## 2019-06-05 NOTE — PROGRESS NOTES
Attended 2 of 2 OT groups offered. Labile and anxious requiring consistent redirection. Easily distracted/triggered by what others innocently were saying. Attention only focused x 2 minutes at the most. Concerned she can not return home and/or her fiance does not want her to return. Offered reassurance and distraction with little relief.

## 2019-06-10 ENCOUNTER — OFFICE VISIT (OUTPATIENT)
Dept: PHARMACY | Facility: CLINIC | Age: 19
End: 2019-06-10
Payer: COMMERCIAL

## 2019-06-10 ENCOUNTER — OFFICE VISIT (OUTPATIENT)
Dept: FAMILY MEDICINE | Facility: CLINIC | Age: 19
End: 2019-06-10
Payer: COMMERCIAL

## 2019-06-10 DIAGNOSIS — R45.1 RESTLESS: Primary | ICD-10-CM

## 2019-06-10 DIAGNOSIS — F41.9 ANXIETY: ICD-10-CM

## 2019-06-10 DIAGNOSIS — F31.9 BIPOLAR I DISORDER (H): Primary | ICD-10-CM

## 2019-06-10 RX ORDER — MULTIPLE VITAMINS W/ MINERALS TAB 9MG-400MCG
1 TAB ORAL DAILY
COMMUNITY
Start: 2019-06-10 | End: 2022-08-15

## 2019-06-10 ASSESSMENT — ANXIETY QUESTIONNAIRES
2. NOT BEING ABLE TO STOP OR CONTROL WORRYING: NEARLY EVERY DAY
5. BEING SO RESTLESS THAT IT IS HARD TO SIT STILL: NEARLY EVERY DAY
3. WORRYING TOO MUCH ABOUT DIFFERENT THINGS: NEARLY EVERY DAY
GAD7 TOTAL SCORE: 20
1. FEELING NERVOUS, ANXIOUS, OR ON EDGE: NEARLY EVERY DAY
7. FEELING AFRAID AS IF SOMETHING AWFUL MIGHT HAPPEN: NEARLY EVERY DAY
6. BECOMING EASILY ANNOYED OR IRRITABLE: MORE THAN HALF THE DAYS

## 2019-06-10 ASSESSMENT — PATIENT HEALTH QUESTIONNAIRE - PHQ9
SUM OF ALL RESPONSES TO PHQ QUESTIONS 1-9: 24
5. POOR APPETITE OR OVEREATING: NEARLY EVERY DAY

## 2019-06-10 NOTE — PROGRESS NOTES
"  Hospitalization Follow-up Visit         [facility]       Hospital Follow-up Visit:    Hospital:  [facility]   Date of Admission: 5/26/2019  Date of Discharge: 6/4/2019  Reason(s) for Admission: \" Bizarre behavior possible psychotic symptoms possible manic episodes\"            Problems taking medications regularly:  None       Post Discharge Medication Reconciliation: discharge medications reconciled, continue medications without change.       Problems adhering to non-medication therapy:  None       Medications reviewed by: by myself.    Summary of hospitalization:  [facility] discharge summary reviewed  Diagnostic Tests/Treatments reviewed.  Follow up needed: Psychiatry  Other Healthcare Providers Involved in Patient s Care:         None at this time  Update since discharge: stable.   Plan of care communicated with patient and partner who patient lives with.    Patient describes feeling down, very anxious, all of her body aches, and feels very badly about self.  Medication provides temporary relief.  During the day they are very sleepy.  Also having lots of agitation and anxiety throughout the day, feels restless.  Restlessness is not worsened, and is slightly improved since before patient was admitted to the hospital.    Patient's partner is concerned about bipolar, manic episode          Review of Systems:   Review of Systems   Constitutional: Positive for malaise/fatigue. Negative for chills, fever and weight loss.   Respiratory: Negative for shortness of breath.    Cardiovascular: Negative for chest pain.   Gastrointestinal: Negative.    Genitourinary: Negative.    Musculoskeletal: Negative.    Psychiatric/Behavioral: Negative for hallucinations and suicidal ideas. The patient is nervous/anxious.                Physical Exam:     There were no vitals filed for this visit.  There is no height or weight on file to calculate BMI.   Patient with heart rate in the 100s 110s  Physical Exam "   Constitutional: Aj Fuller is oriented to person, place, and time. No distress.   Eyes: Conjunctivae are normal.   Cardiovascular: Regular rhythm, normal heart sounds and intact distal pulses.   Pulmonary/Chest: Effort normal and breath sounds normal.   Neurological: Aj Fuller is alert and oriented to person, place, and time.   Skin: Aj Fuller is not diaphoretic.   Psychiatric: Aj Schmitts mood appears anxious. Aj Fuller is slowed (but also constantly moving lower extremity and fidgeting). Aj Fuller is not actively hallucinating. Thought content is not paranoid. Aj Fuller expresses no homicidal and no suicidal ideation.   Slowed speech Aj Fuller is attentive.   Vitals reviewed.         Results:   No testing ordered today    Assessment and Plan      Sheri was seen today for recheck.    Diagnoses and all orders for this visit:    Restless    Anxiety    Plan to keep scheduled appointment with psychiatry, for evaluation here in clinic this Thursday.  Recommended to patient to continue with current course, and have evaluation by psychiatry, explained that patient likely will need ongoing psychiatric care.  Discussed with patient that restlessness is unlikely to be from new medication, Zyprexa, as it has not worsened since she started the medication, and perhaps is improved.  Explained plan of care with patient and partner present during visit, both expressed understanding.    E&M code to be billed if TCM cannot be: 26089    Type of decision making: Moderate complexity (29992)    Options for treatment and follow-up care were reviewed with the patient  Sheri Fuller   engaged in the decision making process and verbalized understanding of the options discussed and agreed with the final plan.      Lorena Khoury, DO
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
## 2019-06-10 NOTE — PATIENT INSTRUCTIONS
Please come to appointment with Dr. Michele on Thursday. If you cannot make it please call our clinic RIGHT AWAY to reschedule or get a referral to another psychiatrist.    Continue medications, no changes pending further evaluation    Your emotional health is important to us!  If you feel that you may hurt yourself or others, please seek help through the hospital ER, or 9-1-1.  You may also call [place] Crisis [facility], toll-free, at 1.679.503.7010 for immediate support.     The National Suicide Prevention Lifeline at 1-188.494.1833 can be reached 24/7.    Trans Lifeline can be reached at 861-590-2892.   For LGBT youth (ages 24 and younger) contemplating suicide, the [employer] Lifeline can be reached at 1-838-4615.

## 2019-06-11 ASSESSMENT — ANXIETY QUESTIONNAIRES: GAD7 TOTAL SCORE: 20

## 2019-06-13 ENCOUNTER — MYC MEDICAL ADVICE (OUTPATIENT)
Dept: PSYCHOLOGY | Facility: CLINIC | Age: 19
End: 2019-06-13

## 2019-06-13 ENCOUNTER — OFFICE VISIT (OUTPATIENT)
Dept: PSYCHOLOGY | Facility: CLINIC | Age: 19
End: 2019-06-13
Payer: COMMERCIAL

## 2019-06-13 DIAGNOSIS — F31.10 BIPOLAR AFFECTIVE DISORDER, CURRENT EPISODE MANIC, CURRENT EPISODE SEVERITY UNSPECIFIED (H): Primary | ICD-10-CM

## 2019-06-13 RX ORDER — OLANZAPINE 2.5 MG/1
TABLET, FILM COATED ORAL
Qty: 30 TABLET | Refills: 0 | Status: SHIPPED | OUTPATIENT
Start: 2019-06-13 | End: 2019-06-20
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
## 2019-06-14 NOTE — PROGRESS NOTES
I have verified the content of the note, which accurately reflects my assessment of the patient and the plan of care.   Manuelito Sanders, PharmD

## 2019-06-18 ASSESSMENT — ENCOUNTER SYMPTOMS
MUSCULOSKELETAL NEGATIVE: 1
CHILLS: 0
HALLUCINATIONS: 0
GASTROINTESTINAL NEGATIVE: 1
NERVOUS/ANXIOUS: 1
WEIGHT LOSS: 0
SHORTNESS OF BREATH: 0
FEVER: 0

## 2019-06-20 ENCOUNTER — OFFICE VISIT (OUTPATIENT)
Dept: PSYCHIATRY | Facility: CLINIC | Age: 19
End: 2019-06-20
Attending: PSYCHIATRY & NEUROLOGY
Payer: COMMERCIAL

## 2019-06-20 VITALS
DIASTOLIC BLOOD PRESSURE: 75 MMHG | WEIGHT: 147 LBS | BODY MASS INDEX: 23.73 KG/M2 | SYSTOLIC BLOOD PRESSURE: 125 MMHG | HEART RATE: 92 BPM

## 2019-06-20 DIAGNOSIS — F31.10 BIPOLAR AFFECTIVE DISORDER, CURRENT EPISODE MANIC, CURRENT EPISODE SEVERITY UNSPECIFIED (H): ICD-10-CM

## 2019-06-20 DIAGNOSIS — F39 MOOD DISORDER (H): Primary | ICD-10-CM

## 2019-06-20 PROCEDURE — G0463 HOSPITAL OUTPT CLINIC VISIT: HCPCS | Mod: ZF

## 2019-06-20 RX ORDER — CLONAZEPAM 0.5 MG/1
TABLET ORAL
Qty: 30 TABLET | Refills: 0 | Status: SHIPPED | OUTPATIENT
Start: 2019-06-20 | End: 2019-06-20

## 2019-06-20 RX ORDER — CLONAZEPAM 0.5 MG/1
TABLET ORAL
Qty: 30 TABLET | Refills: 0 | Status: SHIPPED | OUTPATIENT
Start: 2019-06-20 | End: 2019-07-06

## 2019-06-20 RX ORDER — OLANZAPINE 2.5 MG/1
TABLET, FILM COATED ORAL
Qty: 30 TABLET | Refills: 0 | Status: CANCELLED | OUTPATIENT
Start: 2019-06-20

## 2019-06-20 RX ORDER — OLANZAPINE 15 MG/1
TABLET ORAL
Qty: 30 TABLET | Refills: 1 | Status: SHIPPED | OUTPATIENT
Start: 2019-06-20 | End: 2019-08-08

## 2019-06-20 ASSESSMENT — PAIN SCALES - GENERAL: PAINLEVEL: NO PAIN (0)

## 2019-06-20 ASSESSMENT — PATIENT HEALTH QUESTIONNAIRE - PHQ9: SUM OF ALL RESPONSES TO PHQ QUESTIONS 1-9: 18

## 2019-06-20 NOTE — NURSING NOTE
Chief Complaint   Patient presents with     Recheck Medication     Bipolar affective disorder, current episode manic, current episode severity unspecified
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
## 2019-07-05 DIAGNOSIS — F39 MOOD DISORDER (H): ICD-10-CM

## 2019-07-06 ENCOUNTER — MYC MEDICAL ADVICE (OUTPATIENT)
Dept: PSYCHIATRY | Facility: CLINIC | Age: 19
End: 2019-07-06

## 2019-07-06 ENCOUNTER — MYC REFILL (OUTPATIENT)
Dept: PSYCHIATRY | Facility: CLINIC | Age: 19
End: 2019-07-06

## 2019-07-06 DIAGNOSIS — F39 MOOD DISORDER (H): ICD-10-CM

## 2019-07-08 RX ORDER — CLONAZEPAM 0.5 MG/1
TABLET ORAL
Qty: 30 TABLET | Refills: 0 | Status: SHIPPED | OUTPATIENT
Start: 2019-07-08 | End: 2019-07-09

## 2019-07-08 NOTE — TELEPHONE ENCOUNTER
Last seen: 6/20/19  RTC: none   Cancel: none   No-show: none  Next appt:7/10/19     Incoming refill from patient via phone     Medication requested: clonazePAM (KLONOPIN) 0.5 MG tablet  Directions: One half tab twice a day  Qty: 30  Last refilled: 3/13/19, 6/20/19     Will route to provider for approval. Patient reports taking 1 tab BID instead of 0.5 tab BID. Per [employer] message on 7/6/19, she is completely out of the medication at this time.

## 2019-07-09 RX ORDER — CLONAZEPAM 0.5 MG/1
TABLET ORAL
Qty: 14 TABLET | Refills: 0
Start: 2019-07-09 | End: 2019-07-18 | Stop reason: DRUGHIGH

## 2019-07-09 RX ORDER — CLONAZEPAM 0.5 MG/1
TABLET ORAL
Qty: 14 TABLET | Refills: 0 | Status: CANCELLED | OUTPATIENT
Start: 2019-07-09

## 2019-07-09 NOTE — TELEPHONE ENCOUNTER
Question about medications      Cesilia Michele MD  You 11 hours ago (9:26 PM)      Hi Paulette,   Thanks for taking care of this.   Whatever is going on, she cannot stop taking the med (as is always true for benzos).   However, I did give her #30 tabs of Klonopin 0.5mg tabs on 6/20.   So whether she took 1 tab a day or 1/2 tab BID that is the same number of tabs a day and she should not be out.   She must have taken 1 tab BID if she is out.   Please clarify with her:   - take 0.5mg a day?   - take 0.25mg BID?   - take 0.5mg BID?   Whatever she was taking, of these choices, please give her a one week supply.   Thanks!    Routing comment      Writer received VORB from provider   1. Please continue Klonopin 0.5 mg- take 1 tab BID since she has been taking this dose and decreasing could cause withdrawal symptoms   2. VOID the script printed on 7/8/19 and place in the shredder     Placed a call to pharmacy at [phone] and called in the script with pharmacist Raquel.  Script printed on 7/8/19, VOID and placed in the shredder   Will notify patient via [employer]

## 2019-07-09 NOTE — TELEPHONE ENCOUNTER
Question about medications      Cesilia Michele MD  You 11 hours ago (9:26 PM)      Bradley Caldwell,   Thanks for taking care of this.   Whatever is going on, she cannot stop taking the med (as is always true for benzos).   However, I did give her #30 tabs of Klonopin 0.5mg tabs on 6/20.   So whether she took 1 tab a day or 1/2 tab BID that is the same number of tabs a day and she should not be out.   She must have taken 1 tab BID if she is out.   Please clarify with her:   - take 0.5mg a day?   - take 0.25mg BID?   - take 0.5mg BID?   Whatever she was taking, of these choices, please give her a one week supply.   Thanks!    Routing comment      Writer received VORPAOLA from provider   1. Please continue Klonopin 0.5 mg- take 1 tab BID since she has been taking this dose and decreasing could cause withdrawal symptoms     Placed a call to pharmacy at

## 2019-07-11 RX ORDER — CLONAZEPAM 0.5 MG/1
TABLET ORAL
Qty: 30 TABLET | Refills: 0 | OUTPATIENT
Start: 2019-07-11

## 2019-07-18 ENCOUNTER — OFFICE VISIT (OUTPATIENT)
Dept: PSYCHIATRY | Facility: CLINIC | Age: 19
End: 2019-07-18
Attending: PSYCHIATRY & NEUROLOGY
Payer: COMMERCIAL

## 2019-07-18 VITALS
SYSTOLIC BLOOD PRESSURE: 118 MMHG | DIASTOLIC BLOOD PRESSURE: 64 MMHG | WEIGHT: 163.2 LBS | HEART RATE: 93 BPM | BODY MASS INDEX: 26.34 KG/M2

## 2019-07-18 DIAGNOSIS — F39 MOOD DISORDER (H): Primary | ICD-10-CM

## 2019-07-18 PROCEDURE — G0463 HOSPITAL OUTPT CLINIC VISIT: HCPCS | Mod: ZF

## 2019-07-18 RX ORDER — CLONAZEPAM 0.5 MG/1
TABLET ORAL
Qty: 45 TABLET | Refills: 0 | Status: SHIPPED | OUTPATIENT
Start: 2019-07-18 | End: 2019-08-01

## 2019-07-18 ASSESSMENT — PAIN SCALES - GENERAL: PAINLEVEL: NO PAIN (0)
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
## 2019-07-18 NOTE — PATIENT INSTRUCTIONS
Thank you for coming to the PSYCHIATRY CLINIC.    Lab Testing:  If you had lab testing today and your results are reassuring or normal they will be mailed to you or sent through [email or web address] within 7 days.   If the lab tests need quick action we will call you with the results.  The phone number we will call with results is # [phone] (home) . If this is not the best number please call our clinic and change the number.    Medication Refills:  If you need any refills please call your pharmacy and they will contact us. Our fax number for refills is [phone]. Please allow three business for refill processing.   If you need to  your refill at a new pharmacy, please contact the new pharmacy directly. The new pharmacy will help you get your medications transferred.     Scheduling:  If you have any concerns about today's visit or wish to schedule another appointment please call our office during normal business hours [phone] (8-5:00 M-F)    Contact Us:  Please call [phone] during business hours (8-5:00 M-F).  If after clinic hours, or on the weekend, please call  [phone].    Financial Assistance [phone]  [facility] Billing [phone]  [place] Billing Office, [facility]: [phone]  [facility] Billing [phone]  Medical Records [phone]      MENTAL HEALTH CRISIS NUMBERS:  [facility]:   [facility] - [phone]   Crisis Residence [facility] - [phone]   Walk-In Counseling [facility] [phone]   COPE 24/7 [place] Mobile Team for Adults - [[phone]]; Child - [[phone]]        [facility]:   [facility] - [phone]   Walk-in counseling [facility] - [phone]   Walk-in counseling [facility] - [phone]   Crisis Residence [facility] - [phone]   Urgent Care Adult Mental Health:   --Drop-in, 24/7 crisis line, and \[facility]\"" Mobile Team  [[phone]]    CRISIS TEXT LINE: Text [phone] from anywhere, anytime, any crisis 24/7;    OR SEE [email or web address].org     Poison Control Center - [phone]    CHILD: [facility] needs assessment team - [phone]     [facility] - [phone]; or [facility] - [phone]    If you have a medical emergency please call 911or go to the nearest ER.                    _____________________________________________    Again thank you for choosing PSYCHIATRY CLINIC and please let us know how we can best partner with you to improve you and your family's health.  You may be receiving a survey in the mail regarding this appointment. We would love to have your feedback, both positive and negative, so please fill out the survey and return it using the provided envelope. The survey is done by an external company, so your answers are anonymous.

## 2019-08-01 ENCOUNTER — OFFICE VISIT (OUTPATIENT)
Dept: PSYCHIATRY | Facility: CLINIC | Age: 19
End: 2019-08-01
Attending: PSYCHIATRY & NEUROLOGY
Payer: COMMERCIAL

## 2019-08-01 VITALS
DIASTOLIC BLOOD PRESSURE: 79 MMHG | WEIGHT: 167.8 LBS | BODY MASS INDEX: 27.08 KG/M2 | SYSTOLIC BLOOD PRESSURE: 124 MMHG | HEART RATE: 88 BPM

## 2019-08-01 DIAGNOSIS — F39 MOOD DISORDER (H): ICD-10-CM

## 2019-08-01 PROCEDURE — G0463 HOSPITAL OUTPT CLINIC VISIT: HCPCS | Mod: ZF

## 2019-08-01 RX ORDER — CLONAZEPAM 0.5 MG/1
TABLET ORAL
Qty: 30 TABLET | Refills: 0 | Status: SHIPPED | OUTPATIENT
Start: 2019-08-01 | End: 2019-08-29

## 2019-08-01 ASSESSMENT — PAIN SCALES - GENERAL: PAINLEVEL: MILD PAIN (3)

## 2019-08-02 ENCOUNTER — TELEPHONE (OUTPATIENT)
Dept: PSYCHIATRY | Facility: CLINIC | Age: 19
End: 2019-08-02

## 2019-08-02 ASSESSMENT — PATIENT HEALTH QUESTIONNAIRE - PHQ9: SUM OF ALL RESPONSES TO PHQ QUESTIONS 1-9: 10

## 2019-08-05 ASSESSMENT — PATIENT HEALTH QUESTIONNAIRE - PHQ9: SUM OF ALL RESPONSES TO PHQ QUESTIONS 1-9: 13

## 2019-08-08 DIAGNOSIS — F39 MOOD DISORDER (H): ICD-10-CM

## 2019-08-08 RX ORDER — OLANZAPINE 15 MG/1
15 TABLET ORAL AT BEDTIME
Qty: 30 TABLET | Refills: 0 | Status: SHIPPED | OUTPATIENT
Start: 2019-08-08 | End: 2019-08-29 | Stop reason: DRUGHIGH

## 2019-08-08 NOTE — TELEPHONE ENCOUNTER
Medication requested: OLANZapine (ZYPREXA) 15 MG tablet  Last refilled: not given  Qty: not given      Last seen: 8/1/19  RTC: 4 weeks  Cancel: 0  No-show: 0  Next appt: 8/29/19    Refill decision:   Refilled for 30 days per protocol.

## 2019-08-09 DIAGNOSIS — F39 MOOD DISORDER (H): ICD-10-CM

## 2019-08-12 NOTE — TELEPHONE ENCOUNTER
Medication requested:  metFORMIN (GLUCOPHAGE) 500 MG   Last refilled: 7/18/19  Qty: 60  ** Cont Metformin 500mg BID with meals    Last seen: 8/1/19  RTC:  4 WEEKS  Cancel: 0  No-show: 0  Next appt: 8/29/19  Refill decision: Refilled for 30 days per protocol.

## 2019-08-29 ENCOUNTER — OFFICE VISIT (OUTPATIENT)
Dept: PSYCHIATRY | Facility: CLINIC | Age: 19
End: 2019-08-29
Attending: NURSE PRACTITIONER
Payer: COMMERCIAL

## 2019-08-29 VITALS
SYSTOLIC BLOOD PRESSURE: 109 MMHG | HEART RATE: 97 BPM | WEIGHT: 169 LBS | BODY MASS INDEX: 27.28 KG/M2 | DIASTOLIC BLOOD PRESSURE: 78 MMHG

## 2019-08-29 DIAGNOSIS — F43.10 PTSD (POST-TRAUMATIC STRESS DISORDER): Primary | ICD-10-CM

## 2019-08-29 DIAGNOSIS — F39 MOOD DISORDER (H): ICD-10-CM

## 2019-08-29 PROCEDURE — G0463 HOSPITAL OUTPT CLINIC VISIT: HCPCS | Mod: ZF

## 2019-08-29 RX ORDER — CLONAZEPAM 0.5 MG/1
TABLET ORAL
Qty: 30 TABLET | Refills: 0 | Status: SHIPPED | OUTPATIENT
Start: 2019-08-29 | End: 2019-10-14

## 2019-08-29 RX ORDER — PRAZOSIN HYDROCHLORIDE 1 MG/1
1 CAPSULE ORAL AT BEDTIME
Qty: 30 CAPSULE | Refills: 1 | Status: SHIPPED | OUTPATIENT
Start: 2019-08-29 | End: 2019-09-24

## 2019-08-29 RX ORDER — OLANZAPINE 7.5 MG/1
7.5 TABLET, FILM COATED ORAL AT BEDTIME
Qty: 30 TABLET | Refills: 1 | Status: SHIPPED | OUTPATIENT
Start: 2019-08-29 | End: 2019-10-14

## 2019-08-29 ASSESSMENT — PAIN SCALES - GENERAL: PAINLEVEL: NO PAIN (0)
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
## 2019-08-29 NOTE — PATIENT INSTRUCTIONS
-Start Prazosin 1 mg at bedtime for nightmares.  Pls check your pulse before taking the medication and if it's less than 60 per minute, hold the medication  -Continue Olanzapine 7.5 mg at bedtime,     Thank you for coming to the PSYCHIATRY CLINIC.    Lab Testing:  If you had lab testing today and your results are reassuring or normal they will be mailed to you or sent through [employer] within 7 days.   If the lab tests need quick action we will call you with the results.  The phone number we will call with results is # [phone] (home) . If this is not the best number please call our clinic and change the number.    Medication Refills:  If you need any refills please call your pharmacy and they will contact us. Our fax number for refills is [phone]. Please allow three business for refill processing.   If you need to  your refill at a new pharmacy, please contact the new pharmacy directly. The new pharmacy will help you get your medications transferred.     Scheduling:  If you have any concerns about today's visit or wish to schedule another appointment please call our office during normal business hours [phone] (8-5:00 M-F)    Contact Us:  Please call [phone] during business hours (8-5:00 M-F).  If after clinic hours, or on the weekend, please call  [phone].    Financial Assistance [phone]  [facility] Billing [phone]  [place] Billing Office, [facility]: [phone]  [place] Billing [phone]  Medical Records [phone]      MENTAL HEALTH CRISIS NUMBERS:  [facility]:   [facility] - [phone]   Crisis Residence [facility] - [phone]   Walk-In Counseling Center [facility] - [phone]   COPE 24/7 [place] Mobile Team for Adults - [[phone]]; Child - [[phone]]        [facility]:   [facility] - [phone]   Walk-in counseling [facility] - [phone]   Walk-in counseling [facility] - [phone]   Crisis Residence [facility] - [phone]   Urgent Care Adult Mental Health:   --Drop-in, 24/7 crisis line, and [facility] Mobile Team [[phone]]    CRISIS TEXT LINE: Text 534-616 from anywhere, anytime, any crisis 24/7;    OR SEE www.crisistextline.org     Poison Control Center - [phone]    CHILD: [facility] needs assessment team - [phone]     [facility] Lifeline - [phone]; or [employer] Lifeline - 1-439.839.5432    If you have a medical emergency please call 911or go to the nearest ER.                    _____________________________________________    Again thank you for choosing PSYCHIATRY CLINIC and please let us know how we can best partner with you to improve you and your family's health.  You may be receiving a survey in the mail regarding this appointment. We would love to have your feedback, both positive and negative, so please fill out the survey and return it using the provided envelope. The survey is done by an external company, so your answers are anonymous.

## 2019-08-30 ASSESSMENT — PATIENT HEALTH QUESTIONNAIRE - PHQ9: SUM OF ALL RESPONSES TO PHQ QUESTIONS 1-9: 6

## 2019-09-03 ENCOUNTER — TELEPHONE (OUTPATIENT)
Dept: PSYCHIATRY | Facility: CLINIC | Age: 19
End: 2019-09-03

## 2019-09-03 NOTE — TELEPHONE ENCOUNTER
Clarification     Kerrie Chen, GAYE CNP  You 10 minutes ago (4:11 PM)      Yes, 7.5 mg HS is the right dose as pt never went up to 15.     Thanks!    Routing comment      Writer placed a call to pharmacy at [phone] and relayed to pharmacist Cherelle that patient should be taking Olanzapine 7.5 mg at bedtime. She agreed with the plan.     No further action needed by this writer

## 2019-09-03 NOTE — TELEPHONE ENCOUNTER
[facility] Call Center    Phone Message    May a detailed message be left on voicemail: yes    Reason for Call: Medication Question or concern regarding medication   Prescription Clarification  Name of Medication: Olanzapine  Prescribing Provider: Kerrie Chen   Pharmacy: Online pharmacy    What on the order needs clarification? Pharmacy received two prescriptions one for 15mg () and 7.5mg (Kerrie)          Action Taken: Other: [facility] Pool

## 2019-09-09 ENCOUNTER — TELEPHONE (OUTPATIENT)
Dept: PSYCHIATRY | Facility: CLINIC | Age: 19
End: 2019-09-09

## 2019-09-09 NOTE — TELEPHONE ENCOUNTER
On 9/9/2019 the patient signed an AMIRAH authorizing medical records to be exchanged between [place] and [facility]  for the purpose of continuing care. I sent the request to medical records via the tube system and kept a copy in psychiatry until scanning is complete.  Sol Bhatia [facility]

## 2019-09-17 ENCOUNTER — OFFICE VISIT (OUTPATIENT)
Dept: FAMILY MEDICINE | Facility: CLINIC | Age: 19
End: 2019-09-17
Payer: COMMERCIAL

## 2019-09-17 VITALS
WEIGHT: 176 LBS | OXYGEN SATURATION: 96 % | RESPIRATION RATE: 16 BRPM | HEART RATE: 105 BPM | SYSTOLIC BLOOD PRESSURE: 104 MMHG | HEIGHT: 66 IN | TEMPERATURE: 97.4 F | BODY MASS INDEX: 28.28 KG/M2 | DIASTOLIC BLOOD PRESSURE: 72 MMHG

## 2019-09-17 DIAGNOSIS — Z30.015 ENCOUNTER FOR INITIAL PRESCRIPTION OF VAGINAL RING HORMONAL CONTRACEPTIVE: Primary | ICD-10-CM

## 2019-09-17 DIAGNOSIS — L70.0 CYSTIC ACNE: ICD-10-CM

## 2019-09-17 DIAGNOSIS — Z13.9 SCREENING FOR CONDITION: ICD-10-CM

## 2019-09-17 DIAGNOSIS — F31.9 BIPOLAR I DISORDER (H): ICD-10-CM

## 2019-09-17 LAB — HCG UR QL: NEGATIVE

## 2019-09-17 RX ORDER — ETONOGESTREL AND ETHINYL ESTRADIOL VAGINAL RING .015; .12 MG/D; MG/D
1 RING VAGINAL
Qty: 3 EACH | Refills: 3 | Status: SHIPPED | OUTPATIENT
Start: 2019-09-17 | End: 2020-01-13

## 2019-09-17 ASSESSMENT — MIFFLIN-ST. JEOR: SCORE: 1585.08

## 2019-09-17 NOTE — PROGRESS NOTES
Preceptor Attestation:   Patient seen, evaluated and discussed with the resident. I have verified the content of the note, which accurately reflects my assessment of the patient and the plan of care.   Supervising Physician:  Enio Ramirez MD MD

## 2019-09-17 NOTE — PATIENT INSTRUCTIONS
Here is the plan from today's visit    1. Screening for condition  You are not pregnant!!!    - HCG Qualitative Urine (UPT) ([facility])  - Chlamydia trachomatis PCR    2. Encounter for initial prescription of vaginal ring hormonal contraceptive  Follow the instructions printed out here    - etonogestrel-ethinyl estradiol (NUVARING) 0.12-0.015 MG/24HR vaginal ring; Place 1 each vaginally every 28 days  Dispense: 3 each; Refill: 3      Please call or return to clinic if your symptoms don't go away.    Follow up plan  Follow up with derm  Thank you for coming to [facility] today.  Lab Testing:  **If you had lab testing today and your results are reassuring or normal they will be mailed to you or sent through [employer] within 7 days.   **If the lab tests need quick action we will call you with the results.  The phone number we will call with results is # [phone] (home) . If this is not the best number please call our clinic and change the number.  Medication Refills:  If you need any refills please call your pharmacy and they will contact us.   If you need to  your refill at a new pharmacy, please contact the new pharmacy directly. The new pharmacy will help you get your medications transferred faster.   Scheduling:  If you have any concerns about today's visit or wish to schedule another appointment please call our office during normal business hours [phone] (8-5:00 M-F)  If a referral was made to a [facility] and you don't get a call from central scheduling please call [phone].  If a Mammogram was ordered for you at [facility] call [phone] to schedule or change your appointment.  If you had an XRay/CT/Ultrasound/MRI ordered the number is [phone] to schedule or change your radiology appointment.   Medical Concerns:  If you have urgent medical concerns please call [phone] at any time of the day.    Sabina Hogan,
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
## 2019-09-24 ENCOUNTER — OFFICE VISIT (OUTPATIENT)
Dept: PSYCHIATRY | Facility: CLINIC | Age: 19
End: 2019-09-24
Attending: NURSE PRACTITIONER
Payer: COMMERCIAL

## 2019-09-24 VITALS
WEIGHT: 177 LBS | BODY MASS INDEX: 28.57 KG/M2 | HEART RATE: 89 BPM | DIASTOLIC BLOOD PRESSURE: 75 MMHG | SYSTOLIC BLOOD PRESSURE: 111 MMHG

## 2019-09-24 DIAGNOSIS — F39 MOOD DISORDER (H): ICD-10-CM

## 2019-09-24 DIAGNOSIS — F43.10 PTSD (POST-TRAUMATIC STRESS DISORDER): Primary | ICD-10-CM

## 2019-09-24 PROCEDURE — G0463 HOSPITAL OUTPT CLINIC VISIT: HCPCS | Mod: ZF

## 2019-09-24 RX ORDER — OLANZAPINE 7.5 MG/1
7.5 TABLET, FILM COATED ORAL AT BEDTIME
Qty: 30 TABLET | Refills: 1 | Status: CANCELLED | OUTPATIENT
Start: 2019-09-24

## 2019-09-24 RX ORDER — CLONAZEPAM 0.5 MG/1
TABLET ORAL
Qty: 30 TABLET | Refills: 0 | Status: CANCELLED | OUTPATIENT
Start: 2019-09-24

## 2019-09-24 RX ORDER — PRAZOSIN HYDROCHLORIDE 1 MG/1
1 CAPSULE ORAL AT BEDTIME
Qty: 30 CAPSULE | Refills: 1 | Status: SHIPPED | OUTPATIENT
Start: 2019-09-24 | End: 2019-10-22

## 2019-09-24 ASSESSMENT — PAIN SCALES - GENERAL: PAINLEVEL: NO PAIN (0)

## 2019-09-24 ASSESSMENT — PATIENT HEALTH QUESTIONNAIRE - PHQ9: SUM OF ALL RESPONSES TO PHQ QUESTIONS 1-9: 2

## 2019-09-24 NOTE — PATIENT INSTRUCTIONS
-Continue Prazosin 1 mg at bedtime.  Continue pulse monitoring.  -Restart Metformin 500 mg 2 times a day  -OK to hold Klonopin and Olanzapine at this time.  But monitor sleep closely if there's any sign of changes in sleep, start taking Olanzapine.    Thank you for coming to the PSYCHIATRY CLINIC.    Lab Testing:  If you had lab testing today and your results are reassuring or normal they will be mailed to you or sent through [employer] within 7 days.   If the lab tests need quick action we will call you with the results.  The phone number we will call with results is # [phone] (home) . If this is not the best number please call our clinic and change the number.    Medication Refills:  If you need any refills please call your pharmacy and they will contact us. Our fax number for refills is [phone]. Please allow three business for refill processing.   If you need to  your refill at a new pharmacy, please contact the new pharmacy directly. The new pharmacy will help you get your medications transferred.     Scheduling:  If you have any concerns about today's visit or wish to schedule another appointment please call our office during normal business hours [phone] (8-5:00 M-F)    Contact Us:  Please call [phone] during business hours (8-5:00 M-F).  If after clinic hours, or on the weekend, please call  [phone].    Financial Assistance [phone]  [facility] Billing [phone]  [facility] Billing Office, [facility]: [phone]  [place] Billing [phone]  Medical Records [phone]      MENTAL HEALTH CRISIS NUMBERS:  [facility]:   [facility] - [phone]   Crisis Residence [facility] [place] [facility] - [phone]   Walk-In Counseling [facility] - [phone]   COPE 24/7 [place] Mobile Team for Adults - [[phone]]; Child - [[phone]]        [facility]:   [facility] - [phone]   Walk-in counseling [facility] -  [phone]   Walk-in counseling [facility] - [phone]   Crisis Residence [facility] - [phone]   Urgent Care Adult Mental Health:   --Drop-in, 24/7 crisis line, [facility] Mobile Team [[phone]]    CRISIS TEXT LINE: Text [phone] from anywhere, anytime, any crisis 24/7;    OR SEE www.crisistextline.org     Poison Control Center - [phone]    CHILD: [facility] needs assessment team - [phone]     [employer] Lifeline - [phone]; or [employer] Lifeline - 1-810.377.9808    If you have a medical emergency please call 911or go to the nearest ER.                    _____________________________________________    Again thank you for choosing PSYCHIATRY CLINIC and please let us know how we can best partner with you to improve you and your family's health.  You may be receiving a survey in the mail regarding this appointment. We would love to have your feedback, both positive and negative, so please fill out the survey and return it using the provided envelope. The survey is done by an external company, so your answers are anonymous.
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
## 2019-10-10 ENCOUNTER — TRANSFERRED RECORDS (OUTPATIENT)
Dept: HEALTH INFORMATION MANAGEMENT | Facility: CLINIC | Age: 19
End: 2019-10-10

## 2019-10-14 ENCOUNTER — OFFICE VISIT (OUTPATIENT)
Dept: DERMATOLOGY | Facility: CLINIC | Age: 19
End: 2019-10-14
Payer: COMMERCIAL

## 2019-10-14 DIAGNOSIS — L70.0 ACNE VULGARIS: Primary | ICD-10-CM

## 2019-10-14 RX ORDER — DOXYCYCLINE 100 MG/1
100 CAPSULE ORAL 2 TIMES DAILY
Qty: 60 CAPSULE | Refills: 2 | Status: SHIPPED | OUTPATIENT
Start: 2019-10-14 | End: 2020-02-14

## 2019-10-14 RX ORDER — TRETINOIN 0.5 MG/G
CREAM TOPICAL AT BEDTIME
Qty: 45 G | Refills: 3 | Status: SHIPPED | OUTPATIENT
Start: 2019-10-14 | End: 2020-11-30

## 2019-10-14 ASSESSMENT — PAIN SCALES - GENERAL: PAINLEVEL: NO PAIN (0)

## 2019-10-14 NOTE — NURSING NOTE
Dermatology Rooming Note    Kolby Arriaga's goals for this visit include:   Chief Complaint   Patient presents with     Derm Problem     Acne, Kolby would like to discuss tx options. Not interested instarting accutane.     Soo Torres LPN
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
## 2019-10-14 NOTE — PATIENT INSTRUCTIONS
For acne:  In the morning:  Wash face with a benzoyl peroxide wash in the shower. Recommend Clear Pore by [employer]. Be sure to rinse completely off, because benzoyl peroxide may bleach towels.   After washing face with a benzoyl peroxide wash.  Apply a noncomedogenic moisturizer compounded with an SPF of at least 30.  In the evening:  Wash face with a gentle cleanser (such as Cetaphil)  Apply a pea-sized amount of tretinoin 0.05% cream thinly to the entire face. See handout below for more information on tretinoin. Okay to apply a moisturizer after.     Topical Retinoids Info    What are topical retinoids?    These are medicines that are related to Vitamin A. They are used on the skin.    Retin-A , Renova , Differin , and Tazorac  are brand names.    Come in creams and clear gels    Used to treat skin conditions like pimples (acne), face wrinkling, or dark-colored sunspots    How do I use these medicines?    Wash face and let dry for 15 to 30 minutes.    Use a large pea-size amount of medicine to cover the whole face. Do not put on close to the eyes and lips. Rub in gently.     Start by using every other day. If you have no irritation after a few days, start to use it daily.     You might have too much irritation with daily use. Use it less often until the irritation goes away. Then try to increase slowly to daily use.     Irritation improves over time.    You may use moisturizer if your skin becomes dry. Look for  non-comedogenic  (non-pore plugging) and oil free products.     What are the side effects?    Dryness     Peeling and flaking     Irritation of the skin     Possible increased chance of sunburns. Protect your skin from sunlight. Wear a hat and use a sunscreen with SPF 30 or higher. Your sunscreen should have both UVA and UVB (broad-spectrum) protection.    Who should I call with questions?    [facility]: [phone]     [facility]:  [phone]    For urgent needs outside of business hours call the [facility] at [phone] and ask for the dermatology resident on call

## 2019-10-22 ENCOUNTER — OFFICE VISIT (OUTPATIENT)
Dept: PSYCHIATRY | Facility: CLINIC | Age: 19
End: 2019-10-22
Attending: NURSE PRACTITIONER
Payer: COMMERCIAL

## 2019-10-22 VITALS
HEART RATE: 108 BPM | WEIGHT: 175 LBS | SYSTOLIC BLOOD PRESSURE: 119 MMHG | BODY MASS INDEX: 28.25 KG/M2 | DIASTOLIC BLOOD PRESSURE: 80 MMHG

## 2019-10-22 DIAGNOSIS — F43.10 PTSD (POST-TRAUMATIC STRESS DISORDER): ICD-10-CM

## 2019-10-22 DIAGNOSIS — F31.10 BIPOLAR AFFECTIVE DISORDER, CURRENT EPISODE MANIC, CURRENT EPISODE SEVERITY UNSPECIFIED (H): Primary | ICD-10-CM

## 2019-10-22 PROCEDURE — G0463 HOSPITAL OUTPT CLINIC VISIT: HCPCS | Mod: ZF

## 2019-10-22 RX ORDER — RISPERIDONE 0.5 MG/1
0.5 TABLET ORAL DAILY
Qty: 30 TABLET | Refills: 1 | Status: SHIPPED | OUTPATIENT
Start: 2019-10-22 | End: 2019-12-16

## 2019-10-22 RX ORDER — PRAZOSIN HYDROCHLORIDE 1 MG/1
2 CAPSULE ORAL AT BEDTIME
Qty: 60 CAPSULE | Refills: 1 | Status: SHIPPED | OUTPATIENT
Start: 2019-10-22 | End: 2019-12-16

## 2019-10-22 ASSESSMENT — PAIN SCALES - GENERAL: PAINLEVEL: NO PAIN (0)
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
## 2019-10-25 DIAGNOSIS — F31.10 BIPOLAR AFFECTIVE DISORDER, CURRENT EPISODE MANIC, CURRENT EPISODE SEVERITY UNSPECIFIED (H): ICD-10-CM

## 2019-10-25 RX ORDER — OLANZAPINE 2.5 MG/1
TABLET, FILM COATED ORAL
Qty: 30 TABLET | Refills: 0 | OUTPATIENT
Start: 2019-10-25

## 2019-10-29 ASSESSMENT — PATIENT HEALTH QUESTIONNAIRE - PHQ9: SUM OF ALL RESPONSES TO PHQ QUESTIONS 1-9: 3

## 2019-12-16 DIAGNOSIS — F31.10 BIPOLAR AFFECTIVE DISORDER, CURRENT EPISODE MANIC, CURRENT EPISODE SEVERITY UNSPECIFIED (H): ICD-10-CM

## 2019-12-16 DIAGNOSIS — F43.10 PTSD (POST-TRAUMATIC STRESS DISORDER): ICD-10-CM

## 2019-12-18 RX ORDER — RISPERIDONE 0.5 MG/1
0.5 TABLET ORAL DAILY
Qty: 30 TABLET | Refills: 0 | Status: SHIPPED | OUTPATIENT
Start: 2019-12-18 | End: 2019-12-19 | Stop reason: DRUGHIGH

## 2019-12-18 RX ORDER — PRAZOSIN HYDROCHLORIDE 1 MG/1
CAPSULE ORAL
Qty: 60 CAPSULE | Refills: 0 | Status: SHIPPED | OUTPATIENT
Start: 2019-12-18 | End: 2019-12-19

## 2019-12-18 NOTE — TELEPHONE ENCOUNTER
"Medication requested:  PRAZOSIN 1MG CAPSULES  TAKE 2 CAPSULES BY MOUTH EVERY NIGHT AT BEDTIME. IF PULSE LESS THAN 60/MIN HOLD THE MEDICATION  Last refilled: 10/22/2019  Qty: 60/1  Medication requested:  RISPERIDONE 0.5MG TABLETS  Last refilled: 10/22/2019  Qty: 30/1      Last seen: 10/22/2019  \"-Increase Prazosin to 2 mg at bedtime for nightmares.  Pls continue to monitor pulse, if it's less than 60, pls hold the medication or take just 1 mg.  -Start Risperdal 0.5 mg at bedtime for sleep and mood stabilization\"  RTC: 4 weeks  Cancel: 1  No-show: 0  Next appt: 12/19/19    Refill decision:   Refill pended and routed to the provider for review/determination due to  outside of time frame, cx x 1, appt tomorrow         "

## 2019-12-19 ENCOUNTER — OFFICE VISIT (OUTPATIENT)
Dept: PSYCHIATRY | Facility: CLINIC | Age: 19
End: 2019-12-19
Attending: NURSE PRACTITIONER
Payer: COMMERCIAL

## 2019-12-19 VITALS
HEART RATE: 86 BPM | DIASTOLIC BLOOD PRESSURE: 84 MMHG | BODY MASS INDEX: 29.02 KG/M2 | SYSTOLIC BLOOD PRESSURE: 123 MMHG | WEIGHT: 179.8 LBS

## 2019-12-19 DIAGNOSIS — F43.10 PTSD (POST-TRAUMATIC STRESS DISORDER): ICD-10-CM

## 2019-12-19 DIAGNOSIS — F31.10 BIPOLAR AFFECTIVE DISORDER, CURRENT EPISODE MANIC, CURRENT EPISODE SEVERITY UNSPECIFIED (H): Primary | ICD-10-CM

## 2019-12-19 PROCEDURE — G0463 HOSPITAL OUTPT CLINIC VISIT: HCPCS | Mod: ZF

## 2019-12-19 RX ORDER — RISPERIDONE 1 MG/1
1 TABLET ORAL DAILY
Qty: 30 TABLET | Refills: 1 | Status: SHIPPED | OUTPATIENT
Start: 2019-12-19 | End: 2020-01-16 | Stop reason: DRUGHIGH

## 2019-12-19 ASSESSMENT — PAIN SCALES - GENERAL: PAINLEVEL: NO PAIN (0)
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
## 2019-12-19 NOTE — PATIENT INSTRUCTIONS
-Increase Risperdal to 1 mg daily  -Discontinue Metformin and Prazosin for now as you are taking it consistently.    Thank you for coming to the PSYCH[facility].    Lab Testing:  If you had lab testing today and your results are reassuring or normal they will be mailed to you or sent through [facility] within 7 days.   If the lab tests need quick action we will call you with the results.  The phone number we will call with results is # [phone] (home) . If this is not the best number please call our clinic and change the number.    Medication Refills:  If you need any refills please call your pharmacy and they will contact us. Our fax number for refills is [phone]. Please allow three business for refill processing.   If you need to  your refill at a new pharmacy, please contact the new pharmacy directly. The new pharmacy will help you get your medications transferred.     Scheduling:  If you have any concerns about today's visit or wish to schedule another appointment please call our office during normal business hours [phone] (8-5:00 M-F)    Contact Us:  Please call [phone] during business hours (8-5:00 M-F).  If after clinic hours, or on the weekend, please call  [phone].    Financial Assistance [phone]  [facility]illing [phone]  [facility], [facility]: [phone]  [facility] [phone]  Medical Records [phone]      MENTAL HEALTH CRISIS NUMBERS:  [facility]:   [facility] - [phone]   Crisis Residence [facility] - [phone]   Walk-In Counseling [facility] - [phone]   COPE 24/7 [place] Mobile Team for Adults - [[phone]]; Child - [[phone]]        [facility]:   [facility] - [phone]   Walk-in counseling [facility] - [phone]   Walk-in counseling [facility] - [phone]   Crisis Residence [facility] -  [phone]   Urgent Care Adult Mental Health:   --Drop-in, 24/7 crisis line, and [facility] Mobile Team [[phone]]    CRISIS TEXT LINE: Text [phone] from anywhere, anytime, any crisis 24/7;    OR SEE [email or web address]     Poison Control Center - [phone]    CHILD: [facility] needs assessment team - [phone]     [facility] - [phone]; or [facility] - [phone]    If you have a medical emergency please call 911or go to the nearest ER.                    _____________________________________________    Again thank you for choosing [facility] and please let us know how we can best partner with you to improve you and your family's health.  You may be receiving a survey in the mail regarding this appointment. We would love to have your feedback, both positive and negative, so please fill out the survey and return it using the provided envelope. The survey is done by an external company, so your answers are anonymous.

## 2020-01-13 ENCOUNTER — OFFICE VISIT (OUTPATIENT)
Dept: DERMATOLOGY | Facility: CLINIC | Age: 20
End: 2020-01-13
Payer: COMMERCIAL

## 2020-01-13 DIAGNOSIS — L70.0 ACNE VULGARIS: Primary | ICD-10-CM

## 2020-01-13 RX ORDER — CLINDAMYCIN PHOSPHATE 10 UG/ML
LOTION TOPICAL DAILY
Qty: 60 ML | Refills: 11 | Status: SHIPPED | OUTPATIENT
Start: 2020-01-13 | End: 2021-03-01

## 2020-01-13 ASSESSMENT — PAIN SCALES - GENERAL: PAINLEVEL: NO PAIN (0)

## 2020-01-13 NOTE — NURSING NOTE
"Chief Complaint   Patient presents with     Acne     Kolby notes Acne has been \"going pretty good\". No concerns today.      Chayito Hale LPN    "
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
## 2020-01-15 DIAGNOSIS — F31.10 BIPOLAR AFFECTIVE DISORDER, CURRENT EPISODE MANIC, CURRENT EPISODE SEVERITY UNSPECIFIED (H): ICD-10-CM

## 2020-01-15 DIAGNOSIS — F43.10 PTSD (POST-TRAUMATIC STRESS DISORDER): ICD-10-CM

## 2020-01-16 ENCOUNTER — OFFICE VISIT (OUTPATIENT)
Dept: PSYCHIATRY | Facility: CLINIC | Age: 20
End: 2020-01-16
Attending: NURSE PRACTITIONER
Payer: COMMERCIAL

## 2020-01-16 VITALS
WEIGHT: 180 LBS | SYSTOLIC BLOOD PRESSURE: 125 MMHG | BODY MASS INDEX: 29.05 KG/M2 | HEART RATE: 106 BPM | DIASTOLIC BLOOD PRESSURE: 84 MMHG

## 2020-01-16 DIAGNOSIS — F43.10 PTSD (POST-TRAUMATIC STRESS DISORDER): ICD-10-CM

## 2020-01-16 DIAGNOSIS — F31.10 BIPOLAR AFFECTIVE DISORDER, CURRENT EPISODE MANIC, CURRENT EPISODE SEVERITY UNSPECIFIED (H): ICD-10-CM

## 2020-01-16 DIAGNOSIS — F39 MOOD DISORDER (H): Primary | ICD-10-CM

## 2020-01-16 PROCEDURE — G0463 HOSPITAL OUTPT CLINIC VISIT: HCPCS | Mod: ZF

## 2020-01-16 RX ORDER — RISPERIDONE 0.5 MG/1
0.5 TABLET ORAL 2 TIMES DAILY
Qty: 30 TABLET | Refills: 1 | Status: SHIPPED | OUTPATIENT
Start: 2020-01-16 | End: 2020-02-21

## 2020-01-16 RX ORDER — RISPERIDONE 0.5 MG/1
TABLET ORAL
Qty: 30 TABLET | Refills: 0 | OUTPATIENT
Start: 2020-01-16

## 2020-01-16 RX ORDER — PRAZOSIN HYDROCHLORIDE 1 MG/1
1 CAPSULE ORAL AT BEDTIME
Qty: 30 CAPSULE | Refills: 1 | Status: SHIPPED | OUTPATIENT
Start: 2020-01-16 | End: 2020-02-21

## 2020-01-16 RX ORDER — PRAZOSIN HYDROCHLORIDE 1 MG/1
CAPSULE ORAL
Qty: 60 CAPSULE | Refills: 0 | OUTPATIENT
Start: 2020-01-16

## 2020-01-16 ASSESSMENT — PAIN SCALES - GENERAL: PAINLEVEL: NO PAIN (0)
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
## 2020-01-16 NOTE — PATIENT INSTRUCTIONS
-Restart Risperdal 0.5 mg at bedtime  -Continue Prazosin 1 mg at bedtime for nightmares, but monitor dizziness, if your pulse is less than 60 per minute, hold the dose

## 2020-02-11 DIAGNOSIS — L70.0 ACNE VULGARIS: ICD-10-CM

## 2020-02-13 NOTE — TELEPHONE ENCOUNTER
MONODOX 100 MG   Last refilled: 10/14/19  Qty: 60  : 2      Last seen: 1/13/20  RTC: 3  MOS   Next appt:   NONE   Refill decision:     Complete doxycycline 100 mg,

## 2020-02-14 RX ORDER — DOXYCYCLINE 100 MG/1
CAPSULE ORAL
Qty: 60 CAPSULE | Refills: 2 | Status: SHIPPED | OUTPATIENT
Start: 2020-02-14 | End: 2020-10-13

## 2020-02-14 NOTE — TELEPHONE ENCOUNTER
* Scheduling has been notified to contact the pt for appointment.    Refill pended and routed to the provider for review/determination due to : DO YOU WANT TO CONTINUE MED? / RF?

## 2020-02-21 ENCOUNTER — OFFICE VISIT (OUTPATIENT)
Dept: PSYCHIATRY | Facility: CLINIC | Age: 20
End: 2020-02-21
Attending: NURSE PRACTITIONER
Payer: COMMERCIAL

## 2020-02-21 VITALS
SYSTOLIC BLOOD PRESSURE: 104 MMHG | BODY MASS INDEX: 29.21 KG/M2 | WEIGHT: 181 LBS | HEART RATE: 103 BPM | DIASTOLIC BLOOD PRESSURE: 69 MMHG

## 2020-02-21 DIAGNOSIS — F43.10 PTSD (POST-TRAUMATIC STRESS DISORDER): ICD-10-CM

## 2020-02-21 DIAGNOSIS — F31.10 BIPOLAR AFFECTIVE DISORDER, CURRENT EPISODE MANIC, CURRENT EPISODE SEVERITY UNSPECIFIED (H): Primary | ICD-10-CM

## 2020-02-21 PROCEDURE — G0463 HOSPITAL OUTPT CLINIC VISIT: HCPCS | Mod: ZF

## 2020-02-21 RX ORDER — RISPERIDONE 0.5 MG/1
0.5 TABLET ORAL 2 TIMES DAILY
Qty: 30 TABLET | Refills: 1 | Status: SHIPPED | OUTPATIENT
Start: 2020-02-21 | End: 2020-05-28

## 2020-02-21 RX ORDER — PRAZOSIN HYDROCHLORIDE 1 MG/1
1 CAPSULE ORAL AT BEDTIME
Qty: 30 CAPSULE | Refills: 2 | Status: SHIPPED | OUTPATIENT
Start: 2020-02-21 | End: 2020-05-28

## 2020-02-21 ASSESSMENT — PAIN SCALES - GENERAL: PAINLEVEL: NO PAIN (0)

## 2020-02-21 NOTE — PATIENT INSTRUCTIONS
-Continue Prazosin 1 mg at bedtime  -OK to hold Risperdal for now, but pls monitor cautiously and if sleep or mood has changed, contact Kerrie immidiately
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
## 2020-02-24 ENCOUNTER — TELEPHONE (OUTPATIENT)
Dept: PSYCHIATRY | Facility: CLINIC | Age: 20
End: 2020-02-24

## 2020-02-24 NOTE — TELEPHONE ENCOUNTER
On 2/21/2020, 39 pages of records were received from [facility]. This writer put a copy of the records in Kerrie Chen's folder upfront and this was routed to Kerrie, please shred your copy when finished.  I sent the original documents to scanning on 2/24/2020 and held a copy in Psychiatry until scanning is confirmed. Debbi Bhatia, CMA

## 2020-03-11 ENCOUNTER — HEALTH MAINTENANCE LETTER (OUTPATIENT)
Age: 20
End: 2020-03-11

## 2020-04-10 DIAGNOSIS — F31.10 BIPOLAR AFFECTIVE DISORDER, CURRENT EPISODE MANIC, CURRENT EPISODE SEVERITY UNSPECIFIED (H): ICD-10-CM

## 2020-04-11 DIAGNOSIS — F31.10 BIPOLAR AFFECTIVE DISORDER, CURRENT EPISODE MANIC, CURRENT EPISODE SEVERITY UNSPECIFIED (H): ICD-10-CM

## 2020-04-14 RX ORDER — OLANZAPINE 2.5 MG/1
2.5 TABLET, FILM COATED ORAL AT BEDTIME
Qty: 30 TABLET | Refills: 0 | Status: SHIPPED | OUTPATIENT
Start: 2020-04-14 | End: 2020-05-12

## 2020-04-14 NOTE — TELEPHONE ENCOUNTER
OLANZapine (ZYPREXA) 2.5 MG  Last refilled: 3/3/20  Qty: 30      Last seen: 2/21/20  RTC 3-4 WKS  Cancel: X1  3/20  V.VISIT   No-show: 0  Next appt: NONE  30 day sebastian refill PENDING - including instructions for patient to call the clinic and schedule an appointment  Scheduling has been notified to contact the pt for appointment.     *  Refill decision: Refill pended and routed to the provider for review/determination due to Trial of low dose Zyprexa?  Switch to Geodon? DO YOU WANT TO CONTINUE /  RF MED

## 2020-04-15 NOTE — TELEPHONE ENCOUNTER
risperiDONE 0.5 MG  Last refilled: 2/21/20  Qty: 30  : 1    Last seen: 2/21/20  RTC: 1 MOS  Cancel:   X 1  No-show: 0  Next appt: NONE  30 day sebastian refill PENDING - including instructions for patient to call the clinic and schedule an appointment.  Scheduling has been notified to contact the pt for appointment.      * Refill pended and routed to the provider for review/determination due to  IS PT TAKING MED? DOSE?  2/21/20  OK to hold Risperdal for now, but pls monitor cautiously and if sleep or mood has changed, contact Kerrie immidiately

## 2020-04-17 NOTE — TELEPHONE ENCOUNTER
Writer left voice mail message for Alana ([phone]) requesting callback to confirm that the pt is taking this medication and get pt re-scheduled.

## 2020-05-11 DIAGNOSIS — F31.10 BIPOLAR AFFECTIVE DISORDER, CURRENT EPISODE MANIC, CURRENT EPISODE SEVERITY UNSPECIFIED (H): ICD-10-CM

## 2020-05-12 RX ORDER — OLANZAPINE 2.5 MG/1
TABLET, FILM COATED ORAL
Qty: 30 TABLET | Refills: 0 | Status: SHIPPED | OUTPATIENT
Start: 2020-05-12 | End: 2020-05-28

## 2020-05-12 NOTE — TELEPHONE ENCOUNTER
Medication requested: OLANZAPINE 2.5MG TABLETS    Last refilled: 4/14/20  Qty: 30/0      Last seen: 2/21/20  RTC: 4 weeks  Cancel: 1  No-show: 0  Next appt: None    Refill decision:   Refill pended and routed to the provider for review/determination due to    outside of time frame, cancel x 1, no appt in place  Scheduling has been notified to contact the pt for appointment.

## 2020-05-28 ENCOUNTER — TELEPHONE (OUTPATIENT)
Dept: PSYCHIATRY | Facility: CLINIC | Age: 20
End: 2020-05-28

## 2020-05-28 ENCOUNTER — VIRTUAL VISIT (OUTPATIENT)
Dept: PSYCHIATRY | Facility: CLINIC | Age: 20
End: 2020-05-28
Attending: NURSE PRACTITIONER
Payer: COMMERCIAL

## 2020-05-28 DIAGNOSIS — F43.10 PTSD (POST-TRAUMATIC STRESS DISORDER): ICD-10-CM

## 2020-05-28 DIAGNOSIS — F31.10 BIPOLAR AFFECTIVE DISORDER, CURRENT EPISODE MANIC, CURRENT EPISODE SEVERITY UNSPECIFIED (H): Primary | ICD-10-CM

## 2020-05-28 RX ORDER — OLANZAPINE 7.5 MG/1
7.5 TABLET, FILM COATED ORAL 2 TIMES DAILY
Qty: 60 TABLET | Refills: 1 | Status: SHIPPED | OUTPATIENT
Start: 2020-05-28 | End: 2020-06-01

## 2020-05-28 RX ORDER — RISPERIDONE 0.5 MG/1
0.5 TABLET ORAL 2 TIMES DAILY
Qty: 30 TABLET | Refills: 0 | OUTPATIENT
Start: 2020-05-28

## 2020-05-28 NOTE — PATIENT INSTRUCTIONS
-Take Olanzapine 7.5 mg 2 times a day, monitor sedation, sleep and mood    Your next appointment is scheduled on 6/11 (Thu) at 11:30am.    To access your telemedicine visit:     Open a web browser, like OANDA, and type [email or web address]     You will see a box asking you to check in to let Kerrie Chen know that you are here.     Type in your name and press Check In. That will let Kerrie see you in the virtual waiting room. At your scheduled appointment time, your provider will initiate the visit and connect you.     When your visit is done, you can simply close the browser window.        Please Note:  Ideally, you will connect from a desktop, laptop, or tablet with a WiFi connection. Your computer/tablet must have a camera and microphone. You can use a cell phone, if it has a camera, and if you can connect to WiFi. However, if you connect your phone over a cellular network, it is of lower quality and less reliable.    Thank you for coming to the PSYCHIATRY CLINIC.    Lab Testing:  **If you had lab testing today and your results are reassuring or normal they will be mailed to you or sent through FTRANS within 7 days.   **If the lab tests need quick action we will call you with the results.  The phone number we will call with results is # [phone] (home) . If this is not the best number please call our clinic and change the number.    Medication Refills:  If you need any refills please call your pharmacy and they will contact us. Please allow three business for refill processing.   If you need to  your refill at a new pharmacy, please contact the new pharmacy directly. The new pharmacy will help you get your medications transferred.     Scheduling:  If you have any concerns about today's visit or wish to schedule another appointment please call our office during normal business hours [phone] (8-5:00 M-F)    Contact Us:  Please call [phone] during business hours (8-5:00 M-F).  If after  clinic hours, or on the weekend, please call  [phone].      MENTAL HEALTH CRISIS NUMBERS:  [facility]:   [facility] - [phone]   Crisis Residence [facility] - [phone]   Walk-In Counseling Center [facility] - [phone]   COPE 24/7 [place] Mobile Team for Adults - [[phone]]; Child - [[phone]]     Crisis Connection - [phone]     [facility] - [phone]   Walk-in counseling [facility] - [phone]   Walk-in counseling [facility] - [phone]   Crisis Residence [facility] - [phone]   Urgent Care Adult Mental Health:   --Drop-in, 24/7 crisis line, and [place] Mobile Team [[phone]]    CRISIS TEXT LINE: Text [phone] from anywhere, anytime, any crisis 24/7;    OR SEE [email or web address]     Poison Control Center - [phone]    CHILD: [facility] needs assessment team - [phone]     [facility] - [phone]; or [facility] - [phone]    If you have a medical emergency please call 911or go to the nearest ER.                    _____________________________________________    Again thank you for choosing PSYCHIATRY CLINIC and please let us know how we can best partner with you to improve you and your family's health.  You may be receiving a survey in the mail regarding this appointment. We would love to have your feedback, both positive and negative, so please fill out the survey and return it using the provided envolpe. The survey is done by an external company, so your answers are anonymous.
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
## 2020-05-28 NOTE — TELEPHONE ENCOUNTER
On 5/28/2020, at 1047, writer called patient at mobile to confirm Virtual Visit. Writer unable to make contact with patient. Writer left detailed voice message for callback. [phone], left as call back number. HANNAH Gaona, EMT

## 2020-06-01 DIAGNOSIS — F31.10 BIPOLAR AFFECTIVE DISORDER, CURRENT EPISODE MANIC, CURRENT EPISODE SEVERITY UNSPECIFIED (H): ICD-10-CM

## 2020-06-01 RX ORDER — OLANZAPINE 7.5 MG/1
7.5 TABLET, FILM COATED ORAL 2 TIMES DAILY
Qty: 60 TABLET | Refills: 1 | Status: SHIPPED | OUTPATIENT
Start: 2020-06-01 | End: 2020-06-03

## 2020-06-03 ENCOUNTER — TELEPHONE (OUTPATIENT)
Dept: PSYCHIATRY | Facility: CLINIC | Age: 20
End: 2020-06-03

## 2020-06-03 DIAGNOSIS — F31.10 BIPOLAR AFFECTIVE DISORDER, CURRENT EPISODE MANIC, CURRENT EPISODE SEVERITY UNSPECIFIED (H): ICD-10-CM

## 2020-06-03 RX ORDER — OLANZAPINE 15 MG/1
15 TABLET ORAL DAILY
Qty: 30 TABLET | Refills: 0 | Status: SHIPPED | OUTPATIENT
Start: 2020-06-03 | End: 2020-07-14

## 2020-06-03 NOTE — TELEPHONE ENCOUNTER
"Alternative Requested - OLANZapine (ZYPREXA) 7.5 MG tablet   [facility], \"Can we do 15 MG olanzapine - 1 tablet daily instead of 7.5 MG - 1 tablet BID? The insurance will cover 15 MG tablet once daily but not 2 of the 7.5 MG tablets\".   OLANZapine (ZYPREXA) 7.5 MG tablet  60 tablet  1  6/1/2020   No    Sig - Route: Take 1 tablet (7.5 mg) by mouth 2 times daily - Oral      LVD 5/28/20, NVD 6/11/20.  Refill pended and routed to the provider for review/determination due to change in order requested.    "

## 2020-06-11 ENCOUNTER — VIRTUAL VISIT (OUTPATIENT)
Dept: PSYCHIATRY | Facility: CLINIC | Age: 20
End: 2020-06-11
Attending: NURSE PRACTITIONER
Payer: COMMERCIAL

## 2020-06-11 ENCOUNTER — TELEPHONE (OUTPATIENT)
Dept: PSYCHIATRY | Facility: CLINIC | Age: 20
End: 2020-06-11

## 2020-06-11 DIAGNOSIS — F43.10 PTSD (POST-TRAUMATIC STRESS DISORDER): ICD-10-CM

## 2020-06-11 DIAGNOSIS — F31.10 BIPOLAR AFFECTIVE DISORDER, CURRENT EPISODE MANIC, CURRENT EPISODE SEVERITY UNSPECIFIED (H): Primary | ICD-10-CM
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
## 2020-06-11 NOTE — TELEPHONE ENCOUNTER
On 6/11/2020, at 1102, writer called patient at mobile to confirm Virtual Visit. Writer unable to make contact with patient. Writer left detailed voice message for callback. [phone], left as call back number. HANNAH Gaona, EMT

## 2020-06-11 NOTE — PATIENT INSTRUCTIONS
-Continue on current medication regimen.  Bottle would say Zyprexa 15 mg daily because insurance didn't cover 7.5 mg 2 times a day, but you can continue to take it as 7.5 mg 2 times a day    Your next appointment is scheduled on 7/14 (Tue) at 2:30pm    To access your telemedicine visit:     Open a web browser, like Panoratio, and type [email or web address]     You will see a box asking you to check in to let Kerrie Chen know that you are here.     Type in your name and press Check In. That will let Kerrie see you in the virtual waiting room. At your scheduled appointment time, your provider will initiate the visit and connect you.     When your visit is done, you can simply close the browser window.        Please Note:  Ideally, you will connect from a desktop, laptop, or tablet with a WiFi connection. Your computer/tablet must have a camera and microphone. You can use a cell phone, if it has a camera, and if you can connect to WiFi. However, if you connect your phone over a cellular network, it is of lower quality and less reliable.    Thank you for coming to the PSYCHIATRY CLINIC.    Lab Testing:  **If you had lab testing today and your results are reassuring or normal they will be mailed to you or sent through Resermap within 7 days.   **If the lab tests need quick action we will call you with the results.  The phone number we will call with results is # [phone] (home) . If this is not the best number please call our clinic and change the number.    Medication Refills:  If you need any refills please call your pharmacy and they will contact us. Please allow three business for refill processing.   If you need to  your refill at a new pharmacy, please contact the new pharmacy directly. The new pharmacy will help you get your medications transferred.     Scheduling:  If you have any concerns about today's visit or wish to schedule another appointment please call our office during normal business hours  [phone] (8-5:00 M-F)    Contact Us:  Please call [phone] during business hours (8-5:00 M-F).  If after clinic hours, or on the weekend, please call  [phone].      MENTAL HEALTH CRISIS NUMBERS:  [facility]:   [facility] - [phone]   Crisis Residence [facility] - [phone]   Walk-In Counseling Center [facility] - [phone]   COPE 24/7 [place] Mobile Team for Adults - [[phone]]; Child - [[phone]]     Crisis Connection - [phone]     [facility] - [phone]   Walk-in counseling [facility] - [phone]   Walk-in counseling [facility] - [phone]   Crisis Residence [facility] - [phone]   Urgent Care Adult Mental Health:   --Drop-in, 24/7 crisis line, and [place] Mobile Team [[phone]]    CRISIS TEXT LINE: Text [phone] from anywhere, anytime, any crisis 24/7;    OR SEE [email or web address]     Poison Control Center - [phone]    CHILD: [facility] needs assessment team - [phone]     [facility] - [phone]; or [facility] - [phone]    If you have a medical emergency please call 911or go to the nearest ER.                    _____________________________________________    Again thank you for choosing PSYCHIATRY CLINIC and please let us know how we can best partner with you to improve you and your family's health.  You may be receiving a survey in the mail regarding this appointment. We would love to have your feedback, both positive and negative, so please fill out the survey and return it using the provided envolpe. The survey is done by an external company, so your answers are anonymous.

## 2020-06-25 DIAGNOSIS — F31.10 BIPOLAR AFFECTIVE DISORDER, CURRENT EPISODE MANIC, CURRENT EPISODE SEVERITY UNSPECIFIED (H): ICD-10-CM

## 2020-06-29 RX ORDER — OLANZAPINE 2.5 MG/1
TABLET, FILM COATED ORAL
Qty: 30 TABLET | Refills: 0 | OUTPATIENT
Start: 2020-06-29

## 2020-07-03 ENCOUNTER — TELEPHONE (OUTPATIENT)
Dept: PSYCHIATRY | Facility: CLINIC | Age: 20
End: 2020-07-03

## 2020-07-03 DIAGNOSIS — F39 MOOD DISORDER (H): ICD-10-CM

## 2020-07-03 NOTE — TELEPHONE ENCOUNTER
On 7/3/2020, at 8:54, writer called patient at [phone] to confirm Virtual Visit. Writer unable to make contact with patient. Writer left detailed voice message for call back. [phone] left as call back number. Debbi Bhatia, [facility]

## 2020-07-07 RX ORDER — OLANZAPINE 7.5 MG/1
TABLET, FILM COATED ORAL
Qty: 30 TABLET | Refills: 1 | OUTPATIENT
Start: 2020-07-07

## 2020-07-14 ENCOUNTER — VIRTUAL VISIT (OUTPATIENT)
Dept: PSYCHIATRY | Facility: CLINIC | Age: 20
End: 2020-07-14
Attending: NURSE PRACTITIONER
Payer: COMMERCIAL

## 2020-07-14 ENCOUNTER — TELEPHONE (OUTPATIENT)
Dept: PSYCHIATRY | Facility: CLINIC | Age: 20
End: 2020-07-14

## 2020-07-14 DIAGNOSIS — Z79.899 MEDICATION MANAGEMENT: ICD-10-CM

## 2020-07-14 DIAGNOSIS — F43.10 PTSD (POST-TRAUMATIC STRESS DISORDER): ICD-10-CM

## 2020-07-14 DIAGNOSIS — F31.75 BIPOLAR DISORDER, IN PARTIAL REMISSION, MOST RECENT EPISODE DEPRESSED (H): Primary | ICD-10-CM

## 2020-07-14 RX ORDER — OLANZAPINE 15 MG/1
TABLET ORAL
Qty: 45 TABLET | Refills: 0 | Status: SHIPPED | OUTPATIENT
Start: 2020-07-14 | End: 2020-09-02

## 2020-07-14 NOTE — TELEPHONE ENCOUNTER
On 7/14/2020, at 1401, writer called patient at mobile to confirm Virtual Visit. Writer unable to make contact with patient. Writer left detailed voice message for callback. [phone], left as call back number. HANNAH Gaona, EMT

## 2020-07-14 NOTE — PATIENT INSTRUCTIONS
-Continue on current medication regimen  -Complete labs when you are able.  One of the lab needs 8 hours fasting prior to lab.    Your next appointment is scheduled on 10/13 (Tue) at 2pm.    To access your telemedicine visit:     Open a web browser, like Admitly, and type [email or web address]     You will see a box asking you to check in to let Kerrie Chen know that you are here.     Type in your name and press Check In. That will let Kerrie see you in the virtual waiting room. At your scheduled appointment time, your provider will initiate the visit and connect you.     When your visit is done, you can simply close the browser window.        Please Note:  Ideally, you will connect from a desktop, laptop, or tablet with a WiFi connection. Your computer/tablet must have a camera and microphone. You can use a cell phone, if it has a camera, and if you can connect to WiFi. However, if you connect your phone over a cellular network, it is of lower quality and less reliable.    Thank you for coming to the PSYCHIATRY CLINIC.    Lab Testing:  **If you had lab testing today and your results are reassuring or normal they will be mailed to you or sent through Veeva within 7 days.   **If the lab tests need quick action we will call you with the results.  The phone number we will call with results is # [phone] (home) . If this is not the best number please call our clinic and change the number.    Medication Refills:  If you need any refills please call your pharmacy and they will contact us. Please allow three business for refill processing.   If you need to  your refill at a new pharmacy, please contact the new pharmacy directly. The new pharmacy will help you get your medications transferred.     Scheduling:  If you have any concerns about today's visit or wish to schedule another appointment please call our office during normal business hours [phone] (8-5:00 M-F)    Contact Us:  Please call  [phone] during business hours (8-5:00 M-F).  If after clinic hours, or on the weekend, please call  [phone].      MENTAL HEALTH CRISIS NUMBERS:  [facility]:   [facility] - [phone]   Crisis Residence [facility] - [phone]   Walk-In Counseling Center [facility] - [phone]   COPE 24/7 [place] Mobile Team for Adults - [[phone]]; Child - [[phone]]     Crisis Connection - [phone]     [facility]:   [facility] - [phone]   Walk-in counseling [facility] - [phone]   Walk-in counseling [facility] - [phone]   Crisis Residence [facility] - [phone]   Urgent Care Adult Mental Health:   --Drop-in, 24/7 crisis line, and [place] Mobile Team [[phone]]    CRISIS TEXT LINE: Text [phone] from anywhere, anytime, any crisis 24/7;    OR SEE [email or web address]     Poison Control Center - [phone]    CHILD: [facility] needs assessment team - [phone]     [facility] - [phone]; or [employer] [facility] - [phone]    If you have a medical emergency please call 911or go to the nearest ER.                    _____________________________________________    Again thank you for choosing PSYCHIATRY CLINIC and please let us know how we can best partner with you to improve you and your family's health.  You may be receiving a survey in the mail regarding this appointment. We would love to have your feedback, both positive and negative, so please fill out the survey and return it using the provided envolpe. The survey is done by an external company, so your answers are anonymous.
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
## 2020-07-30 DIAGNOSIS — F31.75 BIPOLAR DISORDER, IN PARTIAL REMISSION, MOST RECENT EPISODE DEPRESSED (H): ICD-10-CM

## 2020-08-03 RX ORDER — OLANZAPINE 15 MG/1
TABLET ORAL
Qty: 30 TABLET | OUTPATIENT
Start: 2020-08-03

## 2020-08-03 NOTE — TELEPHONE ENCOUNTER
Refill denied   [facility][id number] was notified that last  script sent to [facility]#[id number] 7/14/20 #45

## 2020-08-27 DIAGNOSIS — F31.75 BIPOLAR DISORDER, IN PARTIAL REMISSION, MOST RECENT EPISODE DEPRESSED (H): ICD-10-CM

## 2020-09-02 RX ORDER — OLANZAPINE 15 MG/1
TABLET ORAL
Qty: 30 TABLET | Refills: 0 | Status: SHIPPED | OUTPATIENT
Start: 2020-09-02 | End: 2020-10-13

## 2020-09-02 NOTE — TELEPHONE ENCOUNTER
OLANZapine (ZYPREXA) 15 MG   Last refilled: 7/14/20  Qty: 45    Last seen: 7/14/20  RTC:3  MOS  Cancel: 0  No-show: 0  Next appt: 10/13/20  Refill decision: Refilled for 30 days per protocol.

## 2020-10-13 ENCOUNTER — TELEPHONE (OUTPATIENT)
Dept: PSYCHIATRY | Facility: CLINIC | Age: 20
End: 2020-10-13

## 2020-10-13 ENCOUNTER — VIRTUAL VISIT (OUTPATIENT)
Dept: PSYCHIATRY | Facility: CLINIC | Age: 20
End: 2020-10-13
Attending: NURSE PRACTITIONER
Payer: COMMERCIAL

## 2020-10-13 DIAGNOSIS — F31.75 BIPOLAR DISORDER, IN PARTIAL REMISSION, MOST RECENT EPISODE DEPRESSED (H): ICD-10-CM

## 2020-10-13 DIAGNOSIS — F43.10 PTSD (POST-TRAUMATIC STRESS DISORDER): ICD-10-CM

## 2020-10-13 DIAGNOSIS — F31.75 BIPOLAR DISORDER, IN PARTIAL REMISSION, MOST RECENT EPISODE DEPRESSED (H): Primary | ICD-10-CM

## 2020-10-13 PROCEDURE — 99214 OFFICE O/P EST MOD 30 MIN: CPT | Mod: 95 | Performed by: NURSE PRACTITIONER

## 2020-10-13 RX ORDER — OLANZAPINE 15 MG/1
15 TABLET ORAL AT BEDTIME
Qty: 90 TABLET | Refills: 0 | Status: SHIPPED | OUTPATIENT
Start: 2020-10-13 | End: 2021-01-12 | Stop reason: DRUGHIGH

## 2020-10-13 RX ORDER — OLANZAPINE 15 MG/1
TABLET ORAL
Qty: 30 TABLET | Refills: 0 | Status: SHIPPED | OUTPATIENT
Start: 2020-10-13 | End: 2020-10-13

## 2020-10-13 NOTE — TELEPHONE ENCOUNTER
On 10/13/2020, at 1335, writer called patient at [phone] to confirm Virtual Visit. Writer unable to make contact with patient. Writer left detailed voice message for call back. [phone] left as call back number. Erum Whitaker MA
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
## 2020-10-13 NOTE — TELEPHONE ENCOUNTER
Last Seen 07/14/20    RTC 3 months    Cancel 0  No-Show 0    Next Appt 10/13/20 1400    Incoming Refill From [facility] via Fax    Medication Requested Olanzapine 15 mg Tablets     Directions Take 1/2 Tablet by mouth twice a day    Qty 30    Last Refill 09/06/20 Qty 30 with no refills       Medication Refill Approved Per Refill Protocol

## 2020-10-13 NOTE — PATIENT INSTRUCTIONS
-Continue on current medication regimen  -Complete labs when you are able    Your next appointment is scheduled on 1/12/2021 (Tue) at 2pm    Thank you for coming to the [facility] [place] [facility].    Lab Testing:  If you had lab testing today and your results are reassuring or normal they will be mailed to you or sent through [employer] within 7 days. If the lab tests need quick action we will call you with the results. The phone number we will call with results is # [phone] (home) . If this is not the best number please call our clinic and change the number.    Medication Refills:  If you need any refills please call your pharmacy and they will contact us. Our fax number for refills is [phone]. Please allow three business for refill processing. If you need to  your refill at a new pharmacy, please contact the new pharmacy directly. The new pharmacy will help you get your medications transferred.     Scheduling:  If you have any concerns about today's visit or wish to schedule another appointment please call our office during normal business hours [phone] (8-5:00 M-F)    Contact Us:  Please call [phone] during business hours (8-5:00 M-F).  If after clinic hours, or on the weekend, please call  [phone].    Financial Assistance [phone]  [facility] Billing [phone]  Central Billing Office, [facility]: [phone]  [facility] Billing [phone]  Medical Records [phone]      MENTAL HEALTH CRISIS NUMBERS:  For a medical emergency please call  911 or go to the nearest ER.     [facility]:   [facility] -[phone]   Crisis Residence [facility] -[phone]   Walk-In Counseling Center [facility] -[phone]   COPE 24/7 [place] Mobile Team -[phone] (adults)/[phone] (child)  CHILD: [facility] needs assessment team - [phone]      [facility]:   [facility] - [phone]   Walk-in counseling  [facility] - [phone]   Walk-in counseling [facility] - [phone]   Crisis Residence [facility] - [phone]  Urgent Care Adult Mental [facility]-[phone] mobile unit/ 24/7 crisis line    National Crisis Numbers:   National Suicide Prevention Lifeline: 5-551-038-TALK (836-942-4332)  Poison Control Center - 2-122-531-5051  [employer]/resources for a list of additional resources (SOS)  Trans Lifeline a hotline for transgender people 0-306-391-7583  [employer] a hotline for LGBT youth 1-366.483.2892  Crisis Text Line: For any crisis 24/7   To: 839202  see www.crisistextline.org  - IF MAKING A CALL FEELS TOO HARD, send a text!         Again thank you for choosing [facility] [place] [facility] and please let us know how we can best partner with you to improve you and your family's health.    You may be receiving a survey regarding this appointment. We would love to have your feedback, both positive and negative. The survey is done by an external company, so your answers are anonymous.

## 2020-10-19 DIAGNOSIS — L70.0 ACNE VULGARIS: ICD-10-CM

## 2020-10-22 RX ORDER — TRETINOIN 0.5 MG/G
CREAM TOPICAL
Qty: 45 G | Refills: 3 | OUTPATIENT
Start: 2020-10-22

## 2020-11-30 ENCOUNTER — MYC REFILL (OUTPATIENT)
Dept: DERMATOLOGY | Facility: CLINIC | Age: 20
End: 2020-11-30

## 2020-11-30 DIAGNOSIS — L70.0 ACNE VULGARIS: ICD-10-CM

## 2020-12-01 RX ORDER — TRETINOIN 0.5 MG/G
CREAM TOPICAL AT BEDTIME
Qty: 45 G | Refills: 0 | Status: SHIPPED | OUTPATIENT
Start: 2020-12-01 | End: 2021-02-19

## 2020-12-01 NOTE — TELEPHONE ENCOUNTER
Last visit date 1/13/20.     tretinoin (RETIN-A) 0.05 % external cream  Refill process # 1 refill to one year from appt.

## 2020-12-16 DIAGNOSIS — L70.0 ACNE VULGARIS: ICD-10-CM

## 2020-12-17 RX ORDER — DOXYCYCLINE 100 MG/1
CAPSULE ORAL
Qty: 60 CAPSULE | Refills: 2 | OUTPATIENT
Start: 2020-12-17

## 2020-12-18 NOTE — TELEPHONE ENCOUNTER
doxycycline monohydrate (MONODOX) 100 MG capsule  Last Written Prescription Date:  2/14/20  Last Fill Quantity: 60,   # refills: 2  Last Office Visit : 1/13/20  Future Office visit:  None      Denied:  Discontinued by patient per 10/13/20 encounter.  Patient would need appt for refill per refill process #4 and last note recommendations    [id number] [address] AT [facility] [phone], left message.

## 2021-01-03 ENCOUNTER — HEALTH MAINTENANCE LETTER (OUTPATIENT)
Age: 21
End: 2021-01-03

## 2021-01-12 ENCOUNTER — VIRTUAL VISIT (OUTPATIENT)
Dept: PSYCHIATRY | Facility: CLINIC | Age: 21
End: 2021-01-12
Attending: NURSE PRACTITIONER
Payer: COMMERCIAL

## 2021-01-12 ENCOUNTER — TELEPHONE (OUTPATIENT)
Dept: PSYCHIATRY | Facility: CLINIC | Age: 21
End: 2021-01-12

## 2021-01-12 DIAGNOSIS — F31.75 BIPOLAR DISORDER, IN PARTIAL REMISSION, MOST RECENT EPISODE DEPRESSED (H): Primary | ICD-10-CM

## 2021-01-12 DIAGNOSIS — F43.10 PTSD (POST-TRAUMATIC STRESS DISORDER): ICD-10-CM

## 2021-01-12 PROCEDURE — 99213 OFFICE O/P EST LOW 20 MIN: CPT | Mod: 95 | Performed by: NURSE PRACTITIONER

## 2021-01-12 RX ORDER — OLANZAPINE 7.5 MG/1
7.5 TABLET, FILM COATED ORAL AT BEDTIME
Qty: 30 TABLET | Refills: 1 | Status: SHIPPED | OUTPATIENT
Start: 2021-01-12 | End: 2021-03-31

## 2021-01-12 NOTE — TELEPHONE ENCOUNTER
On January 12, 2021, at 12:40 PM, writer called patient at [phone] to confirm Virtual Visit. Writer unable to make contact with patient. Writer left detailed voice message for call back. [phone] left as call back number. Erum Whitaker, [facility]
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
## 2021-01-12 NOTE — PATIENT INSTRUCTIONS
-Continue on current medication regimen (Olanzapine 7.5 mg at bedtime).  Monitor anxiety, sleep and mood.    -Complete labs when you are able.    Your next appointment is scheduled on 3/30/3021 (Tue) at 2pm.    Thank you for coming to the [facility] [place] [facility].    Lab Testing:  If you had lab testing today and your results are reassuring or normal they will be mailed to you or sent through [employer] within 7 days. If the lab tests need quick action we will call you with the results. The phone number we will call with results is # [phone] (home) . If this is not the best number please call our clinic and change the number.    Medication Refills:  If you need any refills please call your pharmacy and they will contact us. Our fax number for refills is [phone]. Please allow three business for refill processing. If you need to  your refill at a new pharmacy, please contact the new pharmacy directly. The new pharmacy will help you get your medications transferred.     Scheduling:  If you have any concerns about today's visit or wish to schedule another appointment please call our office during normal business hours [phone] (8-5:00 M-F)    Contact Us:  Please call [phone] during business hours (8-5:00 M-F).  If after clinic hours, or on the weekend, please call  [phone].    Financial Assistance [phone]  [facility] Billing [phone]  Central Billing Office, [facility]: [phone]  [facility] Billing [phone]  Medical Records [phone]      MENTAL HEALTH CRISIS NUMBERS:  For a medical emergency please call  911 or go to the nearest ER.     [facility]:   [facility] -[phone]   Crisis Residence [facility] -[phone]   Walk-In Counseling Center [facility] -[phone]   COPE 24/7 [place] Mobile Team -[phone] (adults)/[phone] (child)  CHILD: [facility] needs assessment team - [phone]       [facility]:   [facility] - [phone]   Walk-in counseling [facility] - [phone]   Walk-in counseling [facility] - [phone]   Crisis Residence [facility] - [phone]  Urgent Care Adult Mental Ohtgwt-[phone] mobile unit/ 24/7 crisis line    National Crisis Numbers:   National Suicide Prevention Lifeline: 5-760-637-TALK (094-417-8253)  Poison Control Center - 1-287-286-7668  [place]/resources for a list of additional resources (SOS)  Trans Lifeline a hotline for transgender people 0-779-416-5886  The [employer]oni Project a hotline for LGBT youth 1-626.688.4292  Crisis Text Line: For any crisis 24/7   To: 448818  see www.crisistextline.org  - IF MAKING A CALL FEELS TOO HARD, send a text!         Again thank you for choosing [facility] M[facility] ADDI[facility] and please let us know how we can best partner with you to improve you and your family's health.    You may be receiving a survey regarding this appointment. We would love to have your feedback, both positive and negative. The survey is done by an external company, so your answers are anonymous.

## 2021-02-19 DIAGNOSIS — F31.75 BIPOLAR DISORDER, IN PARTIAL REMISSION, MOST RECENT EPISODE DEPRESSED (H): ICD-10-CM

## 2021-02-21 RX ORDER — OLANZAPINE 15 MG/1
15 TABLET ORAL AT BEDTIME
Qty: 90 TABLET | Refills: 0 | OUTPATIENT
Start: 2021-02-21

## 2021-02-25 ENCOUNTER — MYC MEDICAL ADVICE (OUTPATIENT)
Dept: DERMATOLOGY | Facility: CLINIC | Age: 21
End: 2021-02-25

## 2021-02-25 DIAGNOSIS — L70.0 ACNE VULGARIS: Primary | ICD-10-CM

## 2021-02-27 RX ORDER — DOXYCYCLINE 100 MG/1
100 CAPSULE ORAL 2 TIMES DAILY
Qty: 60 CAPSULE | Refills: 2 | Status: SHIPPED | OUTPATIENT
Start: 2021-02-27 | End: 2021-05-12

## 2021-03-01 DIAGNOSIS — L70.0 ACNE VULGARIS: ICD-10-CM

## 2021-03-01 RX ORDER — CLINDAMYCIN PHOSPHATE 10 UG/ML
LOTION TOPICAL DAILY
Qty: 60 ML | Refills: 1 | Status: SHIPPED | OUTPATIENT
Start: 2021-03-01 | End: 2021-05-12

## 2021-03-01 NOTE — TELEPHONE ENCOUNTER
Received refill request for clindamycin lotion. Refill request approved. Next appointment May 2021

## 2021-03-01 NOTE — TELEPHONE ENCOUNTER
clindamycin (CLEOCIN T) 1 % external lotion      Last Written Prescription Date:  1/13/20  Last Fill Quantity: 60 ml,   # refills: 11  Last Office Visit : 1/13/20  Future Office visit:  5/12/21    Routing refill request to provider for review/approval because:  Per protocol refill is to be denied as greater than 1 year since last visit.  Has upcoming appointment in place, can med be refilled to get to appointment date?

## 2021-03-31 DIAGNOSIS — F31.75 BIPOLAR DISORDER, IN PARTIAL REMISSION, MOST RECENT EPISODE DEPRESSED (H): ICD-10-CM

## 2021-03-31 RX ORDER — OLANZAPINE 7.5 MG/1
7.5 TABLET, FILM COATED ORAL AT BEDTIME
Qty: 30 TABLET | Refills: 0 | Status: SHIPPED | OUTPATIENT
Start: 2021-03-31 | End: 2021-05-10

## 2021-03-31 NOTE — TELEPHONE ENCOUNTER
Medication requested: OLANZapine (ZYPREXA) 7.5 MG tablet  Last refilled: 2/28/21  Qty: 30      Last seen: 1/12/21  RTC: 2 months  Cancel: x1-MD not available  No-show: 0  Next appt: 0    Refill decision:   30 day sebastian refill sent to the pharmacy - including instructions for patient to call the clinic and schedule an appointment.  Scheduling has been notified to contact the pt for appointment.

## 2021-04-25 ENCOUNTER — HEALTH MAINTENANCE LETTER (OUTPATIENT)
Age: 21
End: 2021-04-25

## 2021-05-07 DIAGNOSIS — F31.75 BIPOLAR DISORDER, IN PARTIAL REMISSION, MOST RECENT EPISODE DEPRESSED (H): ICD-10-CM

## 2021-05-10 RX ORDER — OLANZAPINE 7.5 MG/1
7.5 TABLET, FILM COATED ORAL AT BEDTIME
Qty: 30 TABLET | Refills: 0 | Status: SHIPPED | OUTPATIENT
Start: 2021-05-10 | End: 2021-05-19 | Stop reason: DRUGHIGH

## 2021-05-10 NOTE — TELEPHONE ENCOUNTER
OLANZapine (ZYPREXA) 7.5 MG   Last refilled: 3/31/21  Qty: 30    Last seen: 11/12/21  RTC: 3/30  Cancel: 3/30  MD  OUT  No-show: 0  Next appt: 5/19/21  Refill pended and routed to the provider for review/determination due to Pt outside of RTC timeframe ( April)

## 2021-05-11 DIAGNOSIS — F43.10 PTSD (POST-TRAUMATIC STRESS DISORDER): ICD-10-CM

## 2021-05-12 ENCOUNTER — VIRTUAL VISIT (OUTPATIENT)
Dept: DERMATOLOGY | Facility: CLINIC | Age: 21
End: 2021-05-12
Payer: COMMERCIAL

## 2021-05-12 DIAGNOSIS — L70.0 ACNE VULGARIS: ICD-10-CM

## 2021-05-12 PROCEDURE — 99213 OFFICE O/P EST LOW 20 MIN: CPT | Mod: GQ | Performed by: PHYSICIAN ASSISTANT

## 2021-05-12 RX ORDER — TRETINOIN 1 MG/G
CREAM TOPICAL AT BEDTIME
Qty: 45 G | Refills: 3 | Status: SHIPPED | OUTPATIENT
Start: 2021-05-12 | End: 2021-10-25

## 2021-05-12 RX ORDER — CLINDAMYCIN PHOSPHATE 10 UG/ML
LOTION TOPICAL DAILY
Qty: 60 ML | Refills: 11 | Status: SHIPPED | OUTPATIENT
Start: 2021-05-12 | End: 2021-10-25

## 2021-05-12 RX ORDER — PRAZOSIN HYDROCHLORIDE 1 MG/1
1 CAPSULE ORAL AT BEDTIME
Qty: 30 CAPSULE | Refills: 2 | OUTPATIENT
Start: 2021-05-12

## 2021-05-12 RX ORDER — DOXYCYCLINE 100 MG/1
100 CAPSULE ORAL 2 TIMES DAILY
Qty: 180 CAPSULE | Refills: 0 | Status: SHIPPED | OUTPATIENT
Start: 2021-05-12 | End: 2021-08-25

## 2021-05-12 ASSESSMENT — PAIN SCALES - GENERAL: PAINLEVEL: NO PAIN (0)

## 2021-05-12 NOTE — NURSING NOTE
"Dermatology Rooming Note    Kolby Arriaga's goals for this visit include:   Chief Complaint   Patient presents with     Acne     Kolby states that things are \"going great.\" There are no specific concerns mentioned.      Janusz Mejía, EMT      "
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
## 2021-05-19 ENCOUNTER — VIRTUAL VISIT (OUTPATIENT)
Dept: PSYCHIATRY | Facility: CLINIC | Age: 21
End: 2021-05-19
Attending: NURSE PRACTITIONER
Payer: COMMERCIAL

## 2021-05-19 ENCOUNTER — TELEPHONE (OUTPATIENT)
Dept: PSYCHIATRY | Facility: CLINIC | Age: 21
End: 2021-05-19

## 2021-05-19 DIAGNOSIS — F31.71 BIPOLAR DISORDER, IN PARTIAL REMISSION, MOST RECENT EPISODE HYPOMANIC (H): Primary | ICD-10-CM

## 2021-05-19 DIAGNOSIS — F43.10 PTSD (POST-TRAUMATIC STRESS DISORDER): ICD-10-CM

## 2021-05-19 PROCEDURE — 99214 OFFICE O/P EST MOD 30 MIN: CPT | Mod: 95 | Performed by: NURSE PRACTITIONER

## 2021-05-19 RX ORDER — OLANZAPINE 10 MG/1
10 TABLET ORAL AT BEDTIME
Qty: 30 TABLET | Refills: 1 | Status: SHIPPED | OUTPATIENT
Start: 2021-05-19 | End: 2021-07-13 | Stop reason: DRUGHIGH

## 2021-05-19 RX ORDER — PRAZOSIN HYDROCHLORIDE 1 MG/1
1 CAPSULE ORAL AT BEDTIME
Qty: 90 CAPSULE | Refills: 0 | Status: SHIPPED | OUTPATIENT
Start: 2021-05-19 | End: 2021-07-13

## 2021-05-19 NOTE — PATIENT INSTRUCTIONS
-Decrease Olanzapine (Zyprexa) to 10 mg at bedtime for your mood.  Monitor your sleep and mood.  If you are noting you are only needing to sleep 4-5 hours or rising mood, please let mckenzie know immediately via [employer] so that we can try Olanzapine 12.5 mg dose.  -Prazosin 1 mg at bedtime has been restarted as you have been taking the medication.    Your next appointment is scheduled on 6/11/2021 (Fri) at 1pm.    Thank you for coming to the [facility] [place] [facility].    Lab Testing:  If you had lab testing today and your results are reassuring or normal they will be mailed to you or sent through [employer] within 7 days. If the lab tests need quick action we will call you with the results. The phone number we will call with results is # [phone] (home) . If this is not the best number please call our clinic and change the number.    Medication Refills:  If you need any refills please call your pharmacy and they will contact us. Our fax number for refills is [phone]. Please allow three business for refill processing. If you need to  your refill at a new pharmacy, please contact the new pharmacy directly. The new pharmacy will help you get your medications transferred.     Scheduling:  If you have any concerns about today's visit or wish to schedule another appointment please call our office during normal business hours [phone] (8-5:00 M-F)    Contact Us:  Please call [phone] during business hours (8-5:00 M-F).  If after clinic hours, or on the weekend, please call  [phone].    Financial Assistance [phone]  [facility] Billing [phone]  Central Billing Office, [facility]: [phone]  [facility]illing [phone]  Medical Records [phone]      MENTAL HEALTH CRISIS NUMBERS:  For a medical emergency please call  911 or go to the nearest ER.     [facility]:   [facility] -[phone]   Crisis Residence [facility] -[phone]   Walk-In Counseling Center [facility] -[phone]   COPE 24/7 [facility] Mobile Team -[phone] (adults)/[phone] (child)  CHILD: [facility] needs assessment team - [phone]      [facility]:   [facility] - [phone]   Walk-in counseling [facility] - [phone]   Walk-in counseling [facility] - [phone]   Crisis Residence [facility] - [phone]  Urgent Care Adult Mental [facility]-[phone] mobile unit/ 24/7 crisis line    National Crisis Numbers:   National Suicide Prevention Lifeline: 8-604-896-TALK (552-970-9164)  Poison Control Center - 5-509-017-7886  [employer]/resources for a list of additional resources (SOS)  Trans Lifeline a hotline for transgender people 4-417-228-7321  The [employer] Project a hotline for LGBT youth 3-395-564-5743  Crisis Text Line: For any crisis 24/7   To: 823259  see www.crisistextline.org  - IF MAKING A CALL FEELS TOO HARD, send a text!         Again thank you for choosing [facility] M[facility] ADDI[facility] and please let us know how we can best partner with you to improve you and your family's health.    You may be receiving a survey regarding this appointment. We would love to have your feedback, both positive and negative. The survey is done by an external company, so your answers are anonymous.

## 2021-05-19 NOTE — TELEPHONE ENCOUNTER
On May 19, 2021, at 3:48 PM, writer called patient at [phone] to confirm Virtual Visit. Writer unable to make contact with patient. Writer left detailed voice message for call back. [phone] left as call back number. rEum Whitaker, [facility]

## 2021-05-19 NOTE — PROGRESS NOTES
Start Time:  1630         End Time: 1646    Telemedicine Visit: The patient's condition can be safely assessed and treated via synchronous audio and visual telemedicine encounter.      Reason for Telemedicine Visit: Due to COVID 19 pandemic, clinic switching all appointments to telemedicine     Originating Site (Patient Location): Patient's home    Distant Site (Provider Location): Provider Remote Setting    Consent:  The patient/guardian has verbally consented to: the potential risks and benefits of telemedicine (video visit) versus in person care; bill my insurance or make self-payment for services provided; and responsibility for payment of non-covered services.     Mode of Communication:  Video Conference via [employer]    As the provider I attest to compliance with applicable laws and regulations related to telemedicine.    Psychiatry Clinic Progress Note                                                                  Patient Name: Kolby Arriaga  YOB: 2000  MRN: [id number]  Date of Service:  5/19/2021  Last Seen:1/12/2021    Kolby Arriaga is a 20 year old person assigned female at birth, identifies as nonbinary who uses the name Aj and pronoun edilson.       Aj Arriaga is a 20 year old year old adult who presents for ongoing psychiatric care.  Aj Arriaga was last seen on 1/12/2021.    At that time,     Medication Ordered/Consults/Labs/tests Ordered:      Medication: Continue on current medication regimen (Olanzapine 7.5 mg at bedtime).  Monitor anxiety, sleep and mood.  OTC Recommendations: none  Lab Orders:  Antipsychotic lab already ordered  Referrals: none  Release of Information: none  Future Treatment Considerations: Per symptoms.   Return for Follow Up: in 2 months per pt's request    Pertinent Background: Long history of feeling traumatized by biologic family, frequent trauma and anxiety reactions at baseline. Had unremarkable neurology eval for MS in spring 2019. Previous diagnoses  "include possible bipolar disorder type I, rule out PTSD, ASD and OCD.  Psych critical item history includes suicidal ideation, trauma hx, psych hosp (<3) and SUBSTANCE USE: cannabis. H/O head banging with loud noises. Possible catatonia during May 2019 hospitalization.     [All pronouns should read as \"they\"]     Previous medication trials: Zyprexa, Klonopin, Ativan, Prazosin (nausea), Risperdal (increased depression?)     Pt was seen with their partner, Alana with their consent during entire appointment.    Interim History                                                                                                        4, 4     Since the last visit,  -Noted doing well. Denies SI, SIB or HI. Has been taking Zyprexa 15 mg HS since March as they noted hypomania.  Since they have increased Zyprexa, noted mood is stable without any hypomania or depression.  -Sleeping 10-12 hours/night however and feels good, but thinks they do not need to sleep this long.  Wondering if Zyprexa dose can be decreased, but not to 7.5 mg.  -also restarted Prazosin 1 mg HS for couple months as nightmares restarted.  But with current dose of Prazosin, has not had any nightmares.  Denies any dizziness.    Denies any symptoms suggestive of hypomania or psychosis.    Current Suicidality/Hx of Suicide Attempts: Denies both  CoCominent Medical concerns: Denies    Medication Side Effects: The patient denies all medication side effects.      Medical Review of Systems     Apart from the symptoms mentioned int he HPI, the 14 point review of systems, including constitutional, HEENT, cardiovascular, respiratory, gastrointestinal, genitourinary, musculoskeletal, integumentary, endocrine, neurological, hematologic and allergic is entirely negative.    Pregnant: None. Nursing: None, Contraception: withdrawal (partner also on estrogen).    Substance Use   Pt has been staying substance free since last seen.     Social/ Family History                    " "              [per patient report]                                 1ea,1ea   Living arrangements: living with spouse and spouse's parents, feels safe  Social Support: spouse, therapist  Access to gun: denies    Allergy                                Patient has no known allergies.    Current Medications                                                                                                       Current Outpatient Medications   Medication Sig Dispense Refill     clindamycin (CLEOCIN T) 1 % external lotion Apply topically daily 60 mL 11     doxycycline monohydrate (MONODOX) 100 MG capsule Take 1 capsule (100 mg) by mouth 2 times daily 180 capsule 0     multivitamin w/minerals (THERA-VIT-M) tablet Take 1 tablet by mouth daily       OLANZapine (ZYPREXA) 7.5 MG tablet Take 1 tablet (7.5 mg) by mouth At Bedtime For more refills,schedule an appointment at [phone] 30 tablet 0     tretinoin (RETIN-A) 0.1 % external cream Apply topically At Bedtime 45 g 3        Mental Status Exam                                                                                   9, 14 cog      Alertness: alert  and oriented  Appearance:  Casually dressed and Adequately groomed  Behavior/Demeanor: cooperative, pleasant and calm, with good  eye contact   Speech: regular rate and rhythm  Mood :  \"good\"  Affect: full range and appropriate; was congruent to mood; was congruent to content  Thought Process (Associations):  Logical, Linear and Goal directed  Thought process (Rate):  Normal  Thought content:  no overt psychosis, denies suicidal ideation, intent or thoughts and patient does not appear to be responding to internal stimuli  Perception:  Reports none;  Denies auditory hallucinations and visual hallucinations  Attention/Concentration:  Normal  Memory:  Immediate recall intact, Short-term memory intact and Long-term memory intact  Language: intact  Fund of Knowledge/Intelligence:  Average  Abstraction:  Normal  Insight:  Good " and Fair  Judgment:  Good and Fair  Cognition: (6) does  appear grossly intact; formal cognitive testing was not done    Physical Exam     Motor activity/EPS:  Normal  Psychomotor: normal or unremarkable    Labs and Results      Pertinent findings on review include: Review of records with relevant information reported in the HPI.  Reviewed pt's past medical record and obtained collateral information.      [place] PRES[place] MONITORING [place] [] was checked today:  indicates no controlled prescriptions reported in previous 12 months.    PHQ9 Today:  N/A  PHQ 8/29/2019 9/24/2019 10/22/2019   PHQ-9 Total Score 6 2 3   Q9: Thoughts of better off dead/self-harm past 2 weeks Not at all Not at all Not at all   F/U: Thoughts of suicide or self-harm - - -   F/U: Self harm-plan - - -   F/U: Self-harm action - - -   F/U: Safety concerns - - -       ROE 7 Today: N/A  ROE-7 SCORE 2/6/2019 5/24/2019 6/10/2019   Total Score 21 12 20       Recent Labs   Lab Test 05/28/19  0739 05/26/19  0747 01/24/19  1513   CR 0.98 0.98 0.7   GFRESTIMATED 83 82 >90     Recent Labs   Lab Test 05/28/19  0739 02/01/19  0956   AST 18 26   ALT 18 35   ALKPHOS 86 77       PSYCHOTROPIC DRUG INTERACTIONS:    no.  MANAGEMENT:  N/A    Impression/Assessment      Aj Arriaga is a 20 year old adult  who presents for med management follow up.  Pt appears stable in their mood and anxiety, denies SI, SIB or HI during the appointment.  Pt noted that they have increased Zyprexa to 15 mg HS from 7.5 mg HS in March as they noted hypomania, but now sleeping 10-12hr/s week while mood is stable.  Discussed possibility of decreasing Zyprexa to 12.5 or 10 mg HS to see if this would continue stabilization of mood but with less sedation.  Pt decided to try Zyprexa 10 mg HS while monitoring for mood and sleep changes.  Will also refill Prazosin 1 mg HS as pt restarted the medication and nightmares are well managed.    Will recommend antipsychotic labs in next  visit.    Diagnosis                                                                    Bipolar Disorder type I (hx of catatonia)  PTSD  Autism Spectrum Disorder  Rule out OCD  History of cannabis use    Treatment Recommendation & Plan       Medication Ordered/Consults/Labs/tests Ordered:     Medication:   -Decrease Olanzapine (Zyprexa) to 10 mg at bedtime for your mood.  Monitor your sleep and mood.  If you are noting you are only needing to sleep 4-5 hours or rising mood, please let mckenzie know immediately via Mychart so that we can try Olanzapine 12.5 mg dose.  -Prazosin 1 mg at bedtime has been restarted as you have been taking the medication.  OTC Recommendations: none  Lab Orders:  Already ordered  Referrals: none  Release of Information: none  Future Treatment Considerations: Per symptoms.   Return for Follow Up: in 3 weeks    -Discussed safety plan for suicidal thoughts  -Discussed plan for suicidality  -Discussed available emergency services  -Patient agrees with the treatment plan  -Encouraged to continue outpatient therapy to gain more coping mechanism for stress.    Treatment Risk Statement: Discussed with the patient my impressions, as well as recommended studies. I educated patient on the differential diagnosis and prognosis. I discussed with the patient the risks and benefits of medications versus no interventions, including efficacy, dose, possible side effects and length of treatment and the importance of medication compliance.  The patient understands the risks, benefits, adverse effects and alternatives. Agrees to treatment with the capacity to do so. No medical contraindications to treatment. The patient also understands the risks of using street drugs or alcohol. I also discussed the potential metabolic side effects of antipsychotics including weight gain, diabetes and lipid abnormalities, risk of tardive dyskinesia and indicates understanding of this and agrees to regular medical  monitoring      CRISIS NUMBERS:   Provided routinely in AVS.    Diagnosis or treatment significantly limited by social determinants of health.    31 minutes spent on the date of the encounter doing chart review, history and exam, documentation and further activities per the note      Kerrie Chen CNP,  5/19/2021

## 2021-07-13 ENCOUNTER — TELEPHONE (OUTPATIENT)
Dept: PSYCHIATRY | Facility: CLINIC | Age: 21
End: 2021-07-13

## 2021-07-13 ENCOUNTER — VIRTUAL VISIT (OUTPATIENT)
Dept: PSYCHIATRY | Facility: CLINIC | Age: 21
End: 2021-07-13
Attending: NURSE PRACTITIONER
Payer: COMMERCIAL

## 2021-07-13 DIAGNOSIS — F43.10 PTSD (POST-TRAUMATIC STRESS DISORDER): ICD-10-CM

## 2021-07-13 DIAGNOSIS — F31.32 BIPOLAR AFFECTIVE DISORDER, CURRENTLY DEPRESSED, MODERATE (H): Primary | ICD-10-CM

## 2021-07-13 PROCEDURE — 99214 OFFICE O/P EST MOD 30 MIN: CPT | Mod: 95 | Performed by: NURSE PRACTITIONER

## 2021-07-13 RX ORDER — PRAZOSIN HYDROCHLORIDE 1 MG/1
2 CAPSULE ORAL AT BEDTIME
Qty: 180 CAPSULE | Refills: 0 | Status: SHIPPED | OUTPATIENT
Start: 2021-07-13 | End: 2021-10-21

## 2021-07-13 RX ORDER — OLANZAPINE 7.5 MG/1
7.5 TABLET, FILM COATED ORAL AT BEDTIME
Qty: 30 TABLET | Refills: 1 | Status: SHIPPED | OUTPATIENT
Start: 2021-07-13 | End: 2021-08-25 | Stop reason: DRUGHIGH

## 2021-07-13 NOTE — PROGRESS NOTES
Start Time:  1230         End Time: 1242    Telemedicine Visit: The patient's condition can be safely assessed and treated via synchronous audio and visual telemedicine encounter.      Reason for Telemedicine Visit: Due to COVID 19 pandemic, clinic switching all appointments to telemedicine     Originating Site (Patient Location): Patient's home    Distant Site (Provider Location): Provider Remote Setting    Consent:  The patient/guardian has verbally consented to: the potential risks and benefits of telemedicine (video visit) versus in person care; bill my insurance or make self-payment for services provided; and responsibility for payment of non-covered services.     Mode of Communication:  Video Conference via [employer]    As the provider I attest to compliance with applicable laws and regulations related to telemedicine.    Psychiatry Clinic Progress Note                                                                  Patient Name: Kolby Arriaga  YOB: 2000  MRN: [id number]  Date of Service:  7/13/2021  Last Seen:5/19/2021    Kolby Arriaga is a 20 year old person assigned female at birth, identifies as nonbinary who uses the name Aj and pronoun edilson.       Aj Arriaga is a 20 year old year old adult who presents for ongoing psychiatric care.  Aj Arriaga was last seen on 5/19/2021.    At that time,     Medication Ordered/Consults/Labs/tests Ordered:      Medication:   -Decrease Olanzapine (Zyprexa) to 10 mg at bedtime for your mood.  Monitor your sleep and mood.  If you are noting you are only needing to sleep 4-5 hours or rising mood, please let mckenzie know immediately via [employer] so that we can try Olanzapine 12.5 mg dose.  -Prazosin 1 mg at bedtime has been restarted as you have been taking the medication.  OTC Recommendations: none  Lab Orders:  Already ordered  Referrals: none  Release of Information: none  Future Treatment Considerations: Per symptoms.   Return for Follow Up: in 3  "weeks    Pertinent Background: Long history of feeling traumatized by biologic family, frequent trauma and anxiety reactions at baseline. Had unremarkable neurology eval for MS in spring 2019. Previous diagnoses include possible bipolar disorder type I, rule out PTSD, ASD and OCD.  Psych critical item history includes suicidal ideation, trauma hx, psych hosp (<3) and SUBSTANCE USE: cannabis. H/O head banging with loud noises. Possible catatonia during May 2019 hospitalization.     [All pronouns should read as \"they\"]     Previous medication trials: Zyprexa, Klonopin, Ativan, Prazosin (nausea), Risperdal (increased depression?)     Pt was seen with their partner, Alana with their consent during entire appointment.    Interim History                                                                                                        4, 4     Since the last visit,  -Took Zyprexa 10 mg daily x 1 month, noticed increased depressed mood, ahnedonia and low energy, thus, reduced further to 7.5 mg x 2 weeks.  Alana noticed increased energy and mood.  -Continues to sleep 10 hours/night at least.  -Nightmares is better, not having daily, but still frequently occurring.  Alana notes pt is having dreams of being murdered. Denies any dizziness.    Denies any symptoms suggestive of hypomania or psychosis.    Current Suicidality/Hx of Suicide Attempts: Denies both  CoCominent Medical concerns: Denies    Medication Side Effects: The patient denies all medication side effects.      Medical Review of Systems     Apart from the symptoms mentioned int he HPI, the 14 point review of systems, including constitutional, HEENT, cardiovascular, respiratory, gastrointestinal, genitourinary, musculoskeletal, integumentary, endocrine, neurological, hematologic and allergic is entirely negative.    Pregnant: None. Nursing: None, Contraception: withdrawal (partner also on estrogen).    Substance Use   Pt has been staying substance free since " last seen.      Social/ Family History                                  [per patient report]                                 1ea,1ea   Living arrangements: living with spouse and spouse's parents, feels safe  Social Support: spouse, therapist  Access to gun: denies    Allergy                                Patient has no known allergies.    Current Medications                                                                                                       Current Outpatient Medications   Medication Sig Dispense Refill     clindamycin (CLEOCIN T) 1 % external lotion Apply topically daily 60 mL 11     doxycycline monohydrate (MONODOX) 100 MG capsule Take 1 capsule (100 mg) by mouth 2 times daily 180 capsule 0     multivitamin w/minerals (THERA-VIT-M) tablet Take 1 tablet by mouth daily       OLANZapine (ZYPREXA) 10 MG tablet Take 1 tablet (10 mg) by mouth At Bedtime 30 tablet 1     prazosin (MINIPRESS) 1 MG capsule Take 1 capsule (1 mg) by mouth At Bedtime 90 capsule 0     tretinoin (RETIN-A) 0.1 % external cream Apply topically At Bedtime 45 g 3        Mental Status Exam                                                                                   9, 14 cog        Alertness: alert  and oriented  Appearance:  Casually dressed and Adequately groomed  Behavior/Demeanor: cooperative, pleasant and calm with good  eye contact   Speech: regular rate and rhythm  Mood :  depressed and anhedonia  Affect: slightly subdued; was congruent to mood; was congruent to content  Thought Process (Associations):  Logical, Linear and Goal directed  Thought process (Rate):  Normal  Thought content:  no overt psychosis, denies suicidal ideation, intent or thoughts and patient does not appear to be responding to internal stimuli  Perception:  Reports none;  Denies depersonalization and derealization  Attention/Concentration:  Fair  Memory:  Immediate recall intact, Short-term memory intact and Long-term memory intact  Language:  intact  Fund of Knowledge/Intelligence:  Average  Abstraction:  Normal  Insight:  Good and Fair  Judgment:  Good and Fair  Cognition: (6) does  appear grossly intact; formal cognitive testing was not done    Physical Exam     Motor activity/EPS:  Normal  Psychomotor: normal or unremarkable    Labs and Results      Pertinent findings on review include: Review of records with relevant information reported in the HPI.  Reviewed pt's past medical record and obtained collateral information.      [place] [] was checked today:  not using controlled substances.    PHQ9 Today:  N/A  PHQ 8/29/2019 9/24/2019 10/22/2019   PHQ-9 Total Score 6 2 3   Q9: Thoughts of better off dead/self-harm past 2 weeks Not at all Not at all Not at all   F/U: Thoughts of suicide or self-harm - - -   F/U: Self harm-plan - - -   F/U: Self-harm action - - -   F/U: Safety concerns - - -       ROE 7 Today: N/A  ROE-7 SCORE 2/6/2019 5/24/2019 6/10/2019   Total Score 21 12 20       Recent Labs   Lab Test 05/28/19  0739 05/26/19  0747 01/24/19  1513   CR 0.98 0.98 0.7   GFRESTIMATED 83 82 >90     Recent Labs   Lab Test 05/28/19  0739 02/01/19  0956   AST 18 26   ALT 18 35   ALKPHOS 86 77       PSYCHOTROPIC DRUG INTERACTIONS:    no.  MANAGEMENT:  N/A    Impression/Assessment      Aj Arriaga is a 20 year old adult  who presents for med management follow up.  Pt appears slightly subdued, but not anxious, denies SI, SIB or HI.  Noted Zyprexa 10 mg daily caused more depressive sxs, thus pt decreased the medication further to 7.5 mg daily x 2 weeks for now.  Pt has not noted significant changes, but the partner noted improvement in mood and energy.  Discussed to continue Zyprexa at 7.5 mg daily for now as it has been only 2 weeks since they changed the medication.  Pt also noted some improvement of nightmares, but still frequently occurring.  Discussed possibly going back to Prazosin 2 mg HS (pt tried this in 2019) while  monitoring for dizziness. Instruct pt to monitor pulse prior to the medication administration and if pulse <60/min to just to take 1 mg HS.      Diagnosis                                                                   Bipolar Disorder type I (hx of catatonia)  PTSD  Autism Spectrum Disorder  Rule out OCD  History of cannabis use    Treatment Recommendation & Plan       Medication Ordered/Consults/Labs/tests Ordered:     Medication:   -Increase Prazosin to 2 mg at bedtime for nightmares.  Monitor for dizziness.  If you could, please monitor pulse before you take the medication, if your pulse is less than 60 per minute, just take 1 mg.  -Continue Olanzapine 7.5 mg daily for now  OTC Recommendations: none  Lab Orders:  none  Referrals: none  Release of Information: none  Future Treatment Considerations: Per symptoms.   Return for Follow Up: in 3 weeks    -Discussed safety plan for suicidal thoughts  -Discussed plan for suicidality  -Discussed available emergency services  -Patient agrees with the treatment plan  -Encouraged to continue outpatient therapy to gain more coping mechanism for stress.    Treatment Risk Statement: Discussed with the patient my impressions, as well as recommended studies. I educated patient on the differential diagnosis and prognosis. I discussed with the patient the risks and benefits of medications versus no interventions, including efficacy, dose, possible side effects and length of treatment and the importance of medication compliance.  The patient understands the risks, benefits, adverse effects and alternatives. Agrees to treatment with the capacity to do so. No medical contraindications to treatment. The patient also understands the risks of using street drugs or alcohol. I also discussed the potential metabolic side effects of antipsychotics including weight gain, diabetes and lipid abnormalities, risk of tardive dyskinesia and indicates understanding of this and agrees to regular  medical monitoring      CRISIS NUMBERS:   Provided routinely in AVS.    Diagnosis or treatment significantly limited by social determinants of health.    Kerrie Chen, MARTY,  7/13/2021

## 2021-07-13 NOTE — TELEPHONE ENCOUNTER
On July 13, 2021, at 12:00 PM, writer called patient at [phone] to confirm Virtual Visit. Writer unable to make contact with patient. Writer left detailed voice message for call back. [phone] left as call back number. Erum Whitaker, [facility]

## 2021-07-13 NOTE — PATIENT INSTRUCTIONS
-Increase Prazosin to 2 mg at bedtime for nightmares.  Monitor for dizziness.  If you could, please monitor pulse before you take the medication, if your pulse is less than 60 per minute, just take 1 mg.  -Continue Olanzapine 7.5 mg daily for now    Your next appointment is scheduled on 8/4/2021 (Wed) at 2:30pm.    Thank you for coming to the [facility] M[facility]ALTH & [facility]DI[facility] [place] [facility].    Lab Testing:  If you had lab testing today and your results are reassuring or normal they will be mailed to you or sent through [employer] within 7 days. If the lab tests need quick action we will call you with the results. The phone number we will call with results is # [phone] (home) . If this is not the best number please call our clinic and change the number.    Medication Refills:  If you need any refills please call your pharmacy and they will contact us. Our fax number for refills is [phone]. Please allow three business for refill processing. If you need to  your refill at a new pharmacy, please contact the new pharmacy directly. The new pharmacy will help you get your medications transferred.     Scheduling:  If you have any concerns about today's visit or wish to schedule another appointment please call our office during normal business hours [phone] (8-5:00 M-F)    Contact Us:  Please call [phone] during business hours (8-5:00 M-F).  If after clinic hours, or on the weekend, please call  [phone].    Financial Assistance [phone]  [facility] Billing [phone]  Central Billing Office, [facility]: [phone]  [place] Billing [phone]  Medical Records [phone]      MENTAL HEALTH CRISIS NUMBERS:  For a medical emergency please call  911 or go to the nearest ER.     [facility]:   [facility] -[phone]   Crisis Residence [facility] Residence -[phone]   Walk-In Counseling Center [facility] -[phone]   COPE 24/7 [facility] Team  -[phone] (adults)/[phone] (child)  CHILD: [facility] needs [facility] team - [phone]      [facility]:   [facility] - [phone]   Walk-in counseling [facility] - [phone]   Walk-in counseling [facility] - [phone]   Crisis Residence [facility] Residence - [phone]  Urgent Care Adult Mental Xbjlwn-[phone] mobile unit/ 24/7 crisis line    National Crisis Numbers:   National Suicide Prevention Lifeline: 1-647-148-TALK (936-241-5794)  Poison Control Center - 1-938.108.5955  [employer]/resources for a list of additional resources (SOS)  Trans Lifeline a hotline for transgender people 4-383-177-7938  The Roni Project a hotline for LGBT youth 7-692-174-3639  Crisis Text Line: For any crisis 24/7   To: 522753  see www.crisistextline.org  - IF MAKING A CALL FEELS TOO HARD, send a text!         Again thank you for choosing [facility] [place] [facility] and please let us know how we can best partner with you to improve you and your family's health.    You may be receiving a survey regarding this appointment. We would love to have your feedback, both positive and negative. The survey is done by an external company, so your answers are anonymous.

## 2021-08-25 ENCOUNTER — TELEPHONE (OUTPATIENT)
Dept: PSYCHIATRY | Facility: CLINIC | Age: 21
End: 2021-08-25

## 2021-08-25 ENCOUNTER — VIRTUAL VISIT (OUTPATIENT)
Dept: PSYCHIATRY | Facility: CLINIC | Age: 21
End: 2021-08-25
Attending: NURSE PRACTITIONER
Payer: COMMERCIAL

## 2021-08-25 DIAGNOSIS — F84.0 AUTISM: ICD-10-CM

## 2021-08-25 DIAGNOSIS — F31.32 BIPOLAR AFFECTIVE DISORDER, CURRENTLY DEPRESSED, MODERATE (H): Primary | ICD-10-CM

## 2021-08-25 DIAGNOSIS — F43.10 PTSD (POST-TRAUMATIC STRESS DISORDER): ICD-10-CM

## 2021-08-25 PROCEDURE — 99214 OFFICE O/P EST MOD 30 MIN: CPT | Mod: 95 | Performed by: NURSE PRACTITIONER

## 2021-08-25 RX ORDER — OLANZAPINE 2.5 MG/1
2.5 TABLET, FILM COATED ORAL AT BEDTIME
Qty: 30 TABLET | Refills: 1 | Status: SHIPPED | OUTPATIENT
Start: 2021-08-25 | End: 2021-11-02

## 2021-08-25 NOTE — TELEPHONE ENCOUNTER
On August 25, 2021, at 3:55 PM, writer called patient at [phone] to confirm Virtual Visit. Writer unable to make contact with patient. Writer left detailed voice message for call back. [phone] left as call back number. Erum Whitaekr, [facility]

## 2021-08-25 NOTE — TELEPHONE ENCOUNTER
On August 25, 2021 at 4:15 PM writer called patient at [phone] to confirm Virtual Visit. Writer unable to make contact with patient. No voice mail left due to call back. Writer  Erum Whitaker CMA

## 2021-08-25 NOTE — PATIENT INSTRUCTIONS
-Decrease Olanzapine to 2.5 mg daily for your mood.  Monitor sedation, anxiety and mood.  -Continue current Prazosin dose.    Your next appointment is scheduled on 9/29/2021 (Wed) at 4pm.    Thank you for coming to the [facility] M[facility]AL HEALTH & [facility]DI[facility] [place] [facility].    Lab Testing:  If you had lab testing today and your results are reassuring or normal they will be mailed to you or sent through [employer] within 7 days. If the lab tests need quick action we will call you with the results. The phone number we will call with results is # [phone] (home) . If this is not the best number please call our clinic and change the number.    Medication Refills:  If you need any refills please call your pharmacy and they will contact us. Our fax number for refills is [phone]. Please allow three business for refill processing. If you need to  your refill at a new pharmacy, please contact the new pharmacy directly. The new pharmacy will help you get your medications transferred.     Scheduling:  If you have any concerns about today's visit or wish to schedule another appointment please call our office during normal business hours [phone] (8-5:00 M-F)    Contact Us:  Please call [phone] during business hours (8-5:00 M-F).  If after clinic hours, or on the weekend, please call  [phone].    Financial Assistance [phone]  [facility] Billing [phone]  Central Billing Office, [facility]: [phone]  [place] Billing [phone]  Medical Records [phone]      MENTAL HEALTH CRISIS NUMBERS:  For a medical emergency please call  911 or go to the nearest ER.     [facility]:   [facility] -[phone]   Crisis Residence [facility] Residence -[phone]   Walk-In Counseling Center [facility] -[phone]   COPE 24/7 [place] Mobile Team -[phone] (adults)/[phone] (child)  CHILD: [facility] needs assessment team - [phone]      [facility]:   [facility]  [facility] - [phone]   Walk-in counseling [facility] - [phone]   Walk-in counseling [facility] - [phone]   Crisis Residence [facility] Residence - [phone]  Urgent Care Adult Mental Nahxkm-[phone] mobile unit/ 24/7 crisis line    National Crisis Numbers:   National Suicide Prevention Lifeline: 2-972-528-TALK (761-399-0880)  Poison Control Center - 1-145-380-6744  [employer]/resources for a list of additional resources (SOS)  Trans Lifeline a hotline for transgender people 3-556-601-6782  The [employer]oni Project a hotline for LGBT youth 1-402.264.8269  Crisis Text Line: For any crisis 24/7   To: 215312  see www.crisistextline.org  - IF MAKING A CALL FEELS TOO HARD, send a text!         Again thank you for choosing [facility] MENTAL HEALTH & ADDICTION [facility] and please let us know how we can best partner with you to improve you and your family's health.    You may be receiving a survey regarding this appointment. We would love to have your feedback, both positive and negative. The survey is done by an external company, so your answers are anonymous.
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
## 2021-09-22 DIAGNOSIS — F31.32 BIPOLAR AFFECTIVE DISORDER, CURRENTLY DEPRESSED, MODERATE (H): ICD-10-CM

## 2021-09-22 RX ORDER — OLANZAPINE 7.5 MG/1
TABLET, FILM COATED ORAL
Qty: 30 TABLET | Refills: 1 | OUTPATIENT
Start: 2021-09-22

## 2021-09-29 DIAGNOSIS — L70.0 ACNE VULGARIS: ICD-10-CM

## 2021-09-29 RX ORDER — DOXYCYCLINE 100 MG/1
100 CAPSULE ORAL 2 TIMES DAILY
Qty: 60 CAPSULE | Refills: 0 | Status: SHIPPED | OUTPATIENT
Start: 2021-09-29 | End: 2021-10-25

## 2021-09-29 NOTE — TELEPHONE ENCOUNTER
doxycycline monohydrate (MONODOX) 100 MG capsule  Last Written Prescription Date:  8/30/2021  Last Fill Quantity: 120,   # refills: 0  Last Office Visit : 5/12/2021  Future Office visit:  10/25/2021  60 Tabs to cover Pt until office visit in October    Kassie Jackson RN  Central Triage Red Flags/Med Refills    Warnings Override History for doxycycline monohydrate (MONODOX) 100 MG capsule [[id number]]    Overridden by Alejandra Walker MD on Aug 30, 2021 10:32 AM   Drug-Drug   1. RETINOIC ACID DERIVATIVES / TETRACYCLINES [Level: Major] [Reason: Tolerated medication/side effects in past]   Other Orders: tretinoin (RETIN-A) 0.1 % external cream      2. DOXYCYCLINE / IRON SALTS(ORAL) [Level: Moderate] [Reason: Tolerated medication/side effects in past]   Other Orders: multivitamin w/minerals (THERA-VIT-M) tablet      3. MAGNESIUM SALTS / TETRACYCLINES [Level: Moderate] [Reason: Tolerated medication/side effects in past]   Other Orders: multivitamin w/minerals (THERA-VIT-M) tablet

## 2021-10-10 ENCOUNTER — HEALTH MAINTENANCE LETTER (OUTPATIENT)
Age: 21
End: 2021-10-10

## 2021-10-18 DIAGNOSIS — F43.10 PTSD (POST-TRAUMATIC STRESS DISORDER): ICD-10-CM

## 2021-10-20 ENCOUNTER — MYC MEDICAL ADVICE (OUTPATIENT)
Dept: DERMATOLOGY | Facility: CLINIC | Age: 21
End: 2021-10-20

## 2021-10-21 RX ORDER — PRAZOSIN HYDROCHLORIDE 1 MG/1
2 CAPSULE ORAL AT BEDTIME
Qty: 60 CAPSULE | Refills: 0 | Status: SHIPPED | OUTPATIENT
Start: 2021-10-21 | End: 2022-08-15

## 2021-10-21 NOTE — TELEPHONE ENCOUNTER
Medication requested: PRAZOSIN 1MG CAPSULES  Last refilled: 7/13/21  Qty: 180      Last seen: 8/25/21  RTC: 1 month  Cancel: 0  No-show: 0  Next appt: 0    Refill decision:   30 day sebastian refill sent to the pharmacy - including instructions for patient to call the clinic and schedule an appointment.  Scheduling has been notified to contact the pt for appointment.

## 2021-10-25 ENCOUNTER — VIRTUAL VISIT (OUTPATIENT)
Dept: DERMATOLOGY | Facility: CLINIC | Age: 21
End: 2021-10-25
Payer: COMMERCIAL

## 2021-10-25 DIAGNOSIS — L70.0 ACNE VULGARIS: ICD-10-CM

## 2021-10-25 PROCEDURE — 99214 OFFICE O/P EST MOD 30 MIN: CPT | Mod: GQ | Performed by: PHYSICIAN ASSISTANT

## 2021-10-25 RX ORDER — SPIRONOLACTONE 50 MG/1
50 TABLET, FILM COATED ORAL DAILY
Qty: 30 TABLET | Refills: 3 | Status: SHIPPED | OUTPATIENT
Start: 2021-10-25 | End: 2022-03-04

## 2021-10-25 RX ORDER — TRETINOIN 1 MG/G
CREAM TOPICAL AT BEDTIME
Qty: 45 G | Refills: 3 | Status: SHIPPED | OUTPATIENT
Start: 2021-10-25 | End: 2022-08-15

## 2021-10-25 RX ORDER — DOXYCYCLINE 100 MG/1
100 CAPSULE ORAL 2 TIMES DAILY
Qty: 60 CAPSULE | Refills: 3 | Status: SHIPPED | OUTPATIENT
Start: 2021-10-25 | End: 2022-04-11

## 2021-10-25 RX ORDER — CLINDAMYCIN PHOSPHATE 10 UG/ML
LOTION TOPICAL DAILY
Qty: 60 ML | Refills: 11 | Status: SHIPPED | OUTPATIENT
Start: 2021-10-25 | End: 2022-04-11

## 2021-10-25 ASSESSMENT — PAIN SCALES - GENERAL: PAINLEVEL: NO PAIN (0)
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
## 2021-11-02 ENCOUNTER — VIRTUAL VISIT (OUTPATIENT)
Dept: PSYCHIATRY | Facility: CLINIC | Age: 21
End: 2021-11-02
Attending: NURSE PRACTITIONER
Payer: COMMERCIAL

## 2021-11-02 ENCOUNTER — TELEPHONE (OUTPATIENT)
Dept: PSYCHIATRY | Facility: CLINIC | Age: 21
End: 2021-11-02

## 2021-11-02 DIAGNOSIS — F43.10 PTSD (POST-TRAUMATIC STRESS DISORDER): ICD-10-CM

## 2021-11-02 DIAGNOSIS — F84.0 AUTISM: ICD-10-CM

## 2021-11-02 DIAGNOSIS — Z79.899 MEDICATION MANAGEMENT: ICD-10-CM

## 2021-11-02 DIAGNOSIS — F31.75 BIPOLAR DISORDER, IN PARTIAL REMISSION, MOST RECENT EPISODE DEPRESSED (H): Primary | ICD-10-CM

## 2021-11-02 PROCEDURE — 99214 OFFICE O/P EST MOD 30 MIN: CPT | Mod: 95 | Performed by: NURSE PRACTITIONER

## 2021-11-02 RX ORDER — PRAZOSIN HYDROCHLORIDE 1 MG/1
3 CAPSULE ORAL AT BEDTIME
Qty: 270 CAPSULE | Refills: 0 | Status: SHIPPED | OUTPATIENT
Start: 2021-11-02 | End: 2022-02-16

## 2021-11-02 RX ORDER — OLANZAPINE 7.5 MG/1
TABLET, FILM COATED ORAL
Status: SHIPPED | DISCHARGE
Start: 2021-11-02 | End: 2021-11-02

## 2021-11-02 NOTE — PATIENT INSTRUCTIONS
-Continue on current medication regimen.  Olanzapine is back to 3.75 mg daily and Prazosin is 3 mg at bedtime as this is how you have been taking the medication.    Your next appointment is scheduled on 12/28/2021 (Tue) at 2:30pm.    Thank you for coming to the [facility] [place] [facility].    Lab Testing:  If you had lab testing today and your results are reassuring or normal they will be mailed to you or sent through [employer] within 7 days. If the lab tests need quick action we will call you with the results. The phone number we will call with results is # [phone] (home) . If this is not the best number please call our clinic and change the number.    Medication Refills:  If you need any refills please call your pharmacy and they will contact us. Our fax number for refills is [phone]. Please allow three business for refill processing. If you need to  your refill at a new pharmacy, please contact the new pharmacy directly. The new pharmacy will help you get your medications transferred.     Scheduling:  If you have any concerns about today's visit or wish to schedule another appointment please call our office during normal business hours [phone] (8-5:00 M-F)    Contact Us:  Please call [phone] during business hours (8-5:00 M-F).  If after clinic hours, or on the weekend, please call  [phone].    Financial Assistance [phone]  [facility] Billing [phone]  Central Billing Office, ealth: [phone]  [facility] Billing [phone]  Medical Records [phone]      MENTAL HEALTH CRISIS NUMBERS:  For a medical emergency please call  911 or go to the nearest ER.     [facility]:   [facility] -[phone]   Crisis Residence [facility] Residence -[phone]   Walk-In Counseling Center \[facility] [place] [facility]\"" -[phone]   COPE 24/7 [facility] Mobile Team -[phone] (adults)/[phone] (child)  CHILD: [facility] Care needs assessment team -  [phone]      [facility]:   [facility] - [phone]   Walk-in counseling [facility] - [phone]   Walk-in counseling [facility] - [phone]   Crisis Residence [facility] Residence - [phone]  Urgent Care Adult Mental [facility]-[phone] mobile unit/ 24/7 crisis line    National Crisis Numbers:   National Suicide Prevention Lifeline: 9-353-860-TALK (442-583-4911)  Poison Control Center - 3-830-889-5610  [employer]/resources for a list of additional resources (SOS)  Trans Lifeline a hotline for transgender people 7-827-923-5199  The [employer] a hotline for LGBT youth 7-408-477-0586  Crisis Text Line: For any crisis 24/7   To: 911856  see www.crisistextline.org  - IF MAKING A CALL FEELS TOO HARD, send a text!         Again thank you for choosing [facility] MENTAL HEALTH & ADDICTION [facility] and please let us know how we can best partner with you to improve you and your family's health.    You may be receiving a survey regarding this appointment. We would love to have your feedback, both positive and negative. The survey is done by an external company, so your answers are anonymous.

## 2021-11-02 NOTE — TELEPHONE ENCOUNTER
On November 2, 2021, at 1:33 PM, writer called patient at mobile to confirm Virtual Visit. Writer unable to make contact with patient. Writer left detailed voice message for call back. [phone] left as call back number. DANAY Potter    On November 2, 2021, at 2:15 PM, writer called patient at mobile to confirm Virtual Visit. Writer unable to make contact with patient. A link to the video visit was sent to the patient's email address and mobile phone number. Dar Live, EMT
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
## 2021-11-21 DIAGNOSIS — F43.10 PTSD (POST-TRAUMATIC STRESS DISORDER): ICD-10-CM

## 2021-11-23 RX ORDER — PRAZOSIN HYDROCHLORIDE 1 MG/1
CAPSULE ORAL
Qty: 60 CAPSULE | Refills: 0 | OUTPATIENT
Start: 2021-11-23

## 2022-02-16 DIAGNOSIS — F43.10 PTSD (POST-TRAUMATIC STRESS DISORDER): ICD-10-CM

## 2022-02-16 RX ORDER — PRAZOSIN HYDROCHLORIDE 1 MG/1
3 CAPSULE ORAL AT BEDTIME
Qty: 270 CAPSULE | Refills: 0 | Status: SHIPPED | OUTPATIENT
Start: 2022-02-16 | End: 2022-06-02

## 2022-02-16 NOTE — TELEPHONE ENCOUNTER
Last Seen: 11/02/21  RTC: 1mo  Cancel: 1  No-Show: 0  Next Appt: None    Incoming Refill From pharmacy via fax    Medication Requested: prazosin (MINIPRESS) 1 MG capsule  Directions: Take 3 capsules (3 mg) by mouth At Bedtime  Qty: 270  Last Refill: 11/20/21 #270    Medication Refill Pended Per Refill Protocol   Message routed to Kerrie Chen for approval of 90 d/s

## 2022-03-01 DIAGNOSIS — L70.0 ACNE VULGARIS: ICD-10-CM

## 2022-03-04 RX ORDER — SPIRONOLACTONE 50 MG/1
50 TABLET, FILM COATED ORAL DAILY
Qty: 30 TABLET | Refills: 1 | Status: SHIPPED | OUTPATIENT
Start: 2022-03-04 | End: 2022-04-11

## 2022-03-04 NOTE — TELEPHONE ENCOUNTER
SPIRONOLACTONE 50MG TABLETS  Last Written Prescription Date:  10/25/2021  Last Fill Quantity: 30,   # refills: 3  Last Office Visit :  10/25/2021  Future Office visit:   4/11/2022   30 tabs, 1 Refill sent to pharm       Kassie Jackosn RN  Central Triage Red Flags/Med Refills

## 2022-04-11 ENCOUNTER — OFFICE VISIT (OUTPATIENT)
Dept: DERMATOLOGY | Facility: CLINIC | Age: 22
End: 2022-04-11
Payer: COMMERCIAL

## 2022-04-11 VITALS — DIASTOLIC BLOOD PRESSURE: 66 MMHG | HEART RATE: 88 BPM | SYSTOLIC BLOOD PRESSURE: 94 MMHG

## 2022-04-11 DIAGNOSIS — L24.9 IRRITANT DERMATITIS: ICD-10-CM

## 2022-04-11 DIAGNOSIS — L70.0 ACNE VULGARIS: Primary | ICD-10-CM

## 2022-04-11 PROCEDURE — 99214 OFFICE O/P EST MOD 30 MIN: CPT | Performed by: PHYSICIAN ASSISTANT

## 2022-04-11 RX ORDER — TRIAMCINOLONE ACETONIDE 0.25 MG/G
OINTMENT TOPICAL 2 TIMES DAILY
Qty: 30 G | Refills: 0 | Status: SHIPPED | OUTPATIENT
Start: 2022-04-11 | End: 2022-11-01

## 2022-04-11 RX ORDER — SPIRONOLACTONE 25 MG/1
25 TABLET ORAL DAILY
Qty: 30 TABLET | Refills: 3 | Status: SHIPPED | OUTPATIENT
Start: 2022-04-11 | End: 2022-08-15

## 2022-04-11 ASSESSMENT — PAIN SCALES - GENERAL: PAINLEVEL: NO PAIN (0)
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
## 2022-05-21 ENCOUNTER — HOSPITAL ENCOUNTER (EMERGENCY)
Facility: CLINIC | Age: 22
Discharge: HOME | End: 2022-05-21
Attending: EMERGENCY MEDICINE | Admitting: EMERGENCY MEDICINE
Payer: COMMERCIAL

## 2022-05-21 ENCOUNTER — HEALTH MAINTENANCE LETTER (OUTPATIENT)
Age: 22
End: 2022-05-21

## 2022-05-21 VITALS
WEIGHT: 155 LBS | OXYGEN SATURATION: 100 % | TEMPERATURE: 96.5 F | BODY MASS INDEX: 24.33 KG/M2 | HEART RATE: 73 BPM | SYSTOLIC BLOOD PRESSURE: 110 MMHG | HEIGHT: 67 IN | DIASTOLIC BLOOD PRESSURE: 74 MMHG | RESPIRATION RATE: 19 BRPM

## 2022-05-21 DIAGNOSIS — R00.2 PALPITATIONS: ICD-10-CM

## 2022-05-21 LAB
ANION GAP SERPL CALCULATED.3IONS-SCNC: 4 MMOL/L (ref 3–14)
ATRIAL RATE - MUSE: 81 BPM
B-HCG SERPL-ACNC: 19 IU/L (ref 0–5)
BASOPHILS # BLD AUTO: 0 10E3/UL (ref 0–0.2)
BASOPHILS NFR BLD AUTO: 1 %
BUN SERPL-MCNC: 10 MG/DL (ref 7–30)
CALCIUM SERPL-MCNC: 9.1 MG/DL (ref 8.5–10.1)
CHLORIDE BLD-SCNC: 106 MMOL/L (ref 94–109)
CO2 SERPL-SCNC: 28 MMOL/L (ref 20–32)
CREAT SERPL-MCNC: 0.73 MG/DL (ref 0.52–1.25)
D DIMER PPP FEU-MCNC: <0.27 UG/ML FEU (ref 0–0.5)
DIASTOLIC BLOOD PRESSURE - MUSE: NORMAL MMHG
EOSINOPHIL # BLD AUTO: 0.1 10E3/UL (ref 0–0.7)
EOSINOPHIL NFR BLD AUTO: 2 %
ERYTHROCYTE [DISTWIDTH] IN BLOOD BY AUTOMATED COUNT: 11.9 % (ref 10–15)
GFR SERPL CREATININE-BSD FRML MDRD: NORMAL ML/MIN/{1.73_M2}
GLUCOSE BLD-MCNC: 79 MG/DL (ref 70–99)
HCT VFR BLD AUTO: 39.2 % (ref 35–53)
HGB BLD-MCNC: 12.5 G/DL (ref 11.7–17.7)
HOLD SPECIMEN: NORMAL
IMM GRANULOCYTES # BLD: 0 10E3/UL
IMM GRANULOCYTES NFR BLD: 0 %
INTERPRETATION ECG - MUSE: NORMAL
LYMPHOCYTES # BLD AUTO: 1.5 10E3/UL (ref 0.8–5.3)
LYMPHOCYTES NFR BLD AUTO: 27 %
MCH RBC QN AUTO: 30.3 PG (ref 26.5–33)
MCHC RBC AUTO-ENTMCNC: 31.9 G/DL (ref 31.5–36.5)
MCV RBC AUTO: 95 FL (ref 78–100)
MONOCYTES # BLD AUTO: 0.4 10E3/UL (ref 0–1.3)
MONOCYTES NFR BLD AUTO: 8 %
NEUTROPHILS # BLD AUTO: 3.5 10E3/UL (ref 1.6–8.3)
NEUTROPHILS NFR BLD AUTO: 62 %
NRBC # BLD AUTO: 0 10E3/UL
NRBC BLD AUTO-RTO: 0 /100
P AXIS - MUSE: 54 DEGREES
PLATELET # BLD AUTO: 405 10E3/UL (ref 150–450)
POTASSIUM BLD-SCNC: 3.9 MMOL/L (ref 3.4–5.3)
PR INTERVAL - MUSE: 112 MS
QRS DURATION - MUSE: 82 MS
QT - MUSE: 356 MS
QTC - MUSE: 413 MS
R AXIS - MUSE: 70 DEGREES
RBC # BLD AUTO: 4.13 10E6/UL (ref 3.8–5.9)
SODIUM SERPL-SCNC: 138 MMOL/L (ref 133–144)
SYSTOLIC BLOOD PRESSURE - MUSE: NORMAL MMHG
T AXIS - MUSE: 37 DEGREES
TROPONIN I SERPL HS-MCNC: <3 NG/L
VENTRICULAR RATE- MUSE: 81 BPM
WBC # BLD AUTO: 5.6 10E3/UL (ref 4–11)

## 2022-05-21 PROCEDURE — 93005 ELECTROCARDIOGRAM TRACING: CPT

## 2022-05-21 PROCEDURE — 85379 FIBRIN DEGRADATION QUANT: CPT | Performed by: EMERGENCY MEDICINE

## 2022-05-21 PROCEDURE — 36415 COLL VENOUS BLD VENIPUNCTURE: CPT | Performed by: EMERGENCY MEDICINE

## 2022-05-21 PROCEDURE — 84484 ASSAY OF TROPONIN QUANT: CPT | Performed by: EMERGENCY MEDICINE

## 2022-05-21 PROCEDURE — 80048 BASIC METABOLIC PNL TOTAL CA: CPT | Performed by: EMERGENCY MEDICINE

## 2022-05-21 PROCEDURE — 99284 EMERGENCY DEPT VISIT MOD MDM: CPT

## 2022-05-21 PROCEDURE — 85025 COMPLETE CBC W/AUTO DIFF WBC: CPT | Performed by: EMERGENCY MEDICINE

## 2022-05-21 PROCEDURE — 84702 CHORIONIC GONADOTROPIN TEST: CPT | Performed by: EMERGENCY MEDICINE

## 2022-05-21 NOTE — ED NOTES
Patient states today she was sitting on the couch and all of a sudden felt heart palpitations, and weak.prompted patient to come in.

## 2022-05-21 NOTE — ED TRIAGE NOTES
Pt took her heart rate at home today - stated it was 190 -   right now HR is 94 -  Stated her HR goes up above 100 a lot at home pt has not been seen for this in the past.      Triage Assessment     Row Name 05/21/22 3697       Triage Assessment (Adult)    Airway WDL WDL       Respiratory WDL    Respiratory WDL WDL       Skin Circulation/Temperature WDL    Skin Circulation/Temperature WDL WDL       Cardiac WDL    Cardiac WDL X  pt sttaed she had a heart rate of 190 about an hour ago - ( around 330pm )        Peripheral/Neurovascular WDL    Peripheral Neurovascular WDL X  pale and clammy        Cognitive/Neuro/Behavioral WDL    Cognitive/Neuro/Behavioral WDL WDL

## 2022-05-21 NOTE — ED PROVIDER NOTES
"  History   Chief Complaint:  Palpitations and Chest Pain    HPI   Kolby Arriaga is a 21 year old adult who presents with palpitations.  The patient states that when they checked their heart rate on a pulse oximeter it was in the 120s to 130s.  They note that they had a recent  and has been following with [facility] for this.  hCG has been appropriately downtrending and they have not had complications.  No fever.  Does not currently have any chest pain, shortness of breath, nausea, vomiting.  Nothing seems to make this better or worse.  No other complaints.    Review of Systems  10 point review of systems performed and is negative except as above and in HPI.    Allergies:  The patient has no known allergies.     Medications:  Zyprexa  Minipress  Aldactone    Past Medical History:     Migraine with aura  Bipolar 1 disorder  Psychosis   Active autistic disorder  Depression     Social History:  The patient was accompanied to the emergency department by their wife.    Physical Exam     Patient Vitals for the past 24 hrs:   BP Temp Temp src Pulse Resp SpO2 Height Weight   22 1656 128/72 (!) 96.5  F (35.8  C) Temporal 108 16 100 % 1.702 m (5' 7\") 70.3 kg (155 lb)     Physical Exam  General: Resting on the gurney, appears comfortable  Head:  The scalp, face, and head appear normal  Mouth/Throat: Mucus membranes are moist  CV:  Regular rate    Normal S1 and S2  No pathological murmur   Resp:  Breath sounds clear and equal bilaterally    Non-labored, no retractions or accessory muscle use    No coarseness    No wheezing   GI:  Abdomen is soft, no rigidity    No tenderness to palpation  MS:  Normal motor assessment of all extremities.    Good capillary refill noted.  Skin:  No rash or lesions noted.  Neuro: Speech is normal and fluent. No apparent deficit.  Psych: Awake. Alert.  Normal affect.      Appropriate interactions.      Emergency Department Course   ECG  ECG results from 22   EKG 12 lead "     Value    Systolic Blood Pressure     Diastolic Blood Pressure     Ventricular Rate 81    Atrial Rate 81    SD Interval 112    QRS Duration 82        QTc 413    P Axis 54    R AXIS 70    T Axis 37    Interpretation ECG      Sinus rhythm with sinus arrhythmia  Normal ECG  No previous ECGs available  Confirmed by GENERATED REPORT, COMPUTER ([id number]),  PETER MONTERO ([id number]) on 5/21/2022 5:15:48 PM       Imaging:  Holter Monitor 48 hour Adult Pediatric    (Results Pending)   Report per radiology    Laboratory:  Labs Ordered and Resulted from Time of ED Arrival to Time of ED Departure   HCG QUANTITATIVE PREGNANCY - Abnormal       Result Value    hCG Quantitative 19 (*)    TROPONIN I - Normal    Troponin I High Sensitivity <3     D DIMER QUANTITATIVE - Normal    D-Dimer Quantitative <0.27     BASIC METABOLIC PANEL    Sodium 138      Potassium 3.9      Chloride 106      Carbon Dioxide (CO2) 28      Anion Gap 4      Urea Nitrogen 10      Creatinine 0.73      Calcium 9.1      Glucose 79      GFR Estimate       CBC WITH PLATELETS AND DIFFERENTIAL    WBC Count 5.6      RBC Count 4.13      Hemoglobin 12.5      Hematocrit 39.2      MCV 95      MCH 30.3      MCHC 31.9      RDW 11.9      Platelet Count 405      % Neutrophils 62      % Lymphocytes 27      % Monocytes 8      % Eosinophils 2      % Basophils 1      % Immature Granulocytes 0      NRBCs per 100 WBC 0      Absolute Neutrophils 3.5      Absolute Lymphocytes 1.5      Absolute Monocytes 0.4      Absolute Eosinophils 0.1      Absolute Basophils 0.0      Absolute Immature Granulocytes 0.0      Absolute NRBCs 0.0          Emergency Department Course:     Reviewed:  I reviewed nursing notes, vitals, past medical history and Care Everywhere    Assessments:   I obtained history and examined the patient as noted above.    I rechecked the patient and explained findings.     Disposition:  The patient was discharged to home.     Impression & Plan     Medical Decision  Making:  Kolby Arriaga is a 21 year old adult presented with a sensation of palpitations.  The work up in the Emergency Department is negative, monitoring and EKG have not shown any abnormal heart rhythms or concerning morphologies.  I considered a broad differential diagnosis including acute coronary syndrome, myocardial infarction, pulmonary embolism, electrolyte abnormalities, drug reaction, anxiety, amongst others.  Electrolytes are normal and the patient is not anemic.  The patient is not pregnant - had recent  and hcg is down trending.  No EKG changes or troponin elevation concerning for acute coronary syndrome.  D-dimer is negative so I doubt PE. A Holter monitor was ordered.  No serious etiology for the palpitations were detected today during this visit and I feel the patient is safe for discharge.   Close follow up with primary care is indicated should the symptoms continue, as further work up may be performed; this was made clear to the patient, who understands.       Diagnosis:    ICD-10-CM    1. Palpitations  R00.2      Scribe Disclosure:  I, Aracelis Connors, am serving as a scribe at 5:53 PM on 2022 to document services personally performed by Allyn Ramos MD based on my observations and the provider's statements to me.     Allyn Ramos MD  22 [id number]

## 2022-05-31 ENCOUNTER — ANCILLARY PROCEDURE (OUTPATIENT)
Dept: CARDIOLOGY | Facility: CLINIC | Age: 22
End: 2022-05-31
Attending: EMERGENCY MEDICINE
Payer: COMMERCIAL

## 2022-05-31 PROCEDURE — 93227 XTRNL ECG REC<48 HR R&I: CPT | Performed by: INTERNAL MEDICINE

## 2022-05-31 PROCEDURE — 93225 XTRNL ECG REC<48 HRS REC: CPT

## 2022-05-31 NOTE — PROGRESS NOTES
Per Allyn Lund patient to have 48 hour holter monitor placed.  Diagnosis: Palpitations   Monitor placed: Yes  Patient Instructed: Yes  Patient verbalized understanding: Yes  Holter # 2  Placed by Alexis Caro

## 2022-06-02 DIAGNOSIS — F43.10 PTSD (POST-TRAUMATIC STRESS DISORDER): ICD-10-CM

## 2022-06-02 RX ORDER — PRAZOSIN HYDROCHLORIDE 1 MG/1
3 CAPSULE ORAL AT BEDTIME
Qty: 270 CAPSULE | Refills: 0 | Status: SHIPPED | OUTPATIENT
Start: 2022-06-02 | End: 2022-08-15

## 2022-06-02 NOTE — TELEPHONE ENCOUNTER
Last seen: 11/2  RTC: 1 month  Cancel: 12/28  No-show: 0  Next appt: None - Scheduling has been notified    Incoming refill from pharmacy via fax    Medication requested: prazosin (MINIPRESS) 1 MG capsule  Directions: Sig - Route: Take 3 capsules (3 mg) by mouth At Bedtime - Oral  Qty: 270  Last refilled: 3/4    Medication refill pended and routed to provider for approval
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
## 2022-06-07 NOTE — PATIENT INSTRUCTIONS
We will check a urine pregnancy test and get a pelvic US today for pelvic cramping.  I will mychart your results.  Referral was placed for IUD at [facility]. They will call to get this set up.  Referral was placed for therapy. They will call to set this up.  If you can get your immunization records from [place], please get them and send to [facility].    Sorry we did not get to all your concerns today. Please make a follow up visit at the  to discuss these further.    Dr. Mckinney       Preventive Health Recommendations  21 to 25     Yearly exam:   See your health care provider every year in order to  Review health changes.   Discuss preventive care.    Review your medicines if your doctor has prescribed any.    You should be tested each year for STDs (sexually transmitted diseases).     Talk to your provider about how often you should have cholesterol testing.    Get a Pap test every three years. If you have an abnormal result, your doctor may have you test more often.    If you are at risk for diabetes, you should have a diabetes test (fasting glucose).     Shots:   Get a flu shot each year.   Get a tetanus shot every 10 years.   Consider getting the shot (vaccine) that prevents cervical cancer (Gardasil).    Nutrition:   Eat at least 5 servings of fruits and vegetables each day.  Eat whole-grain bread, whole-wheat pasta and brown rice instead of white grains and rice.  Get adequate Calcium and Vitamin D.     Lifestyle  Exercise at least 150 minutes a week each week (30 minutes a day, 5 days a week). This will help you control your weight and prevent disease.  Limit alcohol to one drink per day.  No smoking.   Wear sunscreen to prevent skin cancer.  See your dentist every six months for an exam and cleaning.

## 2022-06-08 ENCOUNTER — OFFICE VISIT (OUTPATIENT)
Dept: FAMILY MEDICINE | Facility: CLINIC | Age: 22
End: 2022-06-08
Payer: COMMERCIAL

## 2022-06-08 VITALS
BODY MASS INDEX: 24.27 KG/M2 | OXYGEN SATURATION: 98 % | SYSTOLIC BLOOD PRESSURE: 109 MMHG | HEART RATE: 114 BPM | HEIGHT: 66 IN | TEMPERATURE: 98.6 F | WEIGHT: 151 LBS | RESPIRATION RATE: 16 BRPM | DIASTOLIC BLOOD PRESSURE: 71 MMHG

## 2022-06-08 DIAGNOSIS — Z71.85 VACCINE COUNSELING: ICD-10-CM

## 2022-06-08 DIAGNOSIS — F43.10 PTSD (POST-TRAUMATIC STRESS DISORDER): ICD-10-CM

## 2022-06-08 DIAGNOSIS — Z30.014 ENCOUNTER FOR INITIAL PRESCRIPTION OF INTRAUTERINE CONTRACEPTIVE DEVICE (IUD): ICD-10-CM

## 2022-06-08 DIAGNOSIS — F32.A DEPRESSION, UNSPECIFIED DEPRESSION TYPE: ICD-10-CM

## 2022-06-08 DIAGNOSIS — Z00.00 ROUTINE GENERAL MEDICAL EXAMINATION AT A HEALTH CARE FACILITY: Primary | ICD-10-CM

## 2022-06-08 DIAGNOSIS — R10.2 PELVIC CRAMPING: ICD-10-CM

## 2022-06-08 LAB
ALBUMIN UR-MCNC: NEGATIVE MG/DL
APPEARANCE UR: CLEAR
BACTERIA #/AREA URNS HPF: ABNORMAL /HPF
BILIRUB UR QL STRIP: NEGATIVE
CLUE CELLS: NORMAL
COLOR UR AUTO: YELLOW
GLUCOSE UR STRIP-MCNC: NEGATIVE MG/DL
HCG UR QL: NEGATIVE
HGB UR QL STRIP: NEGATIVE
KETONES UR STRIP-MCNC: NEGATIVE MG/DL
LEUKOCYTE ESTERASE UR QL STRIP: ABNORMAL
NITRATE UR QL: NEGATIVE
PH UR STRIP: 6 [PH] (ref 5–8)
RBC #/AREA URNS AUTO: ABNORMAL /HPF
SP GR UR STRIP: 1.01 (ref 1–1.03)
SQUAMOUS #/AREA URNS AUTO: ABNORMAL /LPF
TRICHOMONAS, WET PREP: NORMAL
UROBILINOGEN UR STRIP-ACNC: 0.2 E.U./DL
WBC #/AREA URNS AUTO: ABNORMAL /HPF
WBC'S/HIGH POWER FIELD, WET PREP: NORMAL
YEAST, WET PREP: NORMAL

## 2022-06-08 PROCEDURE — 81001 URINALYSIS AUTO W/SCOPE: CPT | Performed by: STUDENT IN AN ORGANIZED HEALTH CARE EDUCATION/TRAINING PROGRAM

## 2022-06-08 PROCEDURE — 99395 PREV VISIT EST AGE 18-39: CPT | Mod: GC | Performed by: STUDENT IN AN ORGANIZED HEALTH CARE EDUCATION/TRAINING PROGRAM

## 2022-06-08 PROCEDURE — 87210 SMEAR WET MOUNT SALINE/INK: CPT | Performed by: STUDENT IN AN ORGANIZED HEALTH CARE EDUCATION/TRAINING PROGRAM

## 2022-06-08 PROCEDURE — 87591 N.GONORRHOEAE DNA AMP PROB: CPT | Performed by: STUDENT IN AN ORGANIZED HEALTH CARE EDUCATION/TRAINING PROGRAM

## 2022-06-08 PROCEDURE — 81025 URINE PREGNANCY TEST: CPT | Performed by: STUDENT IN AN ORGANIZED HEALTH CARE EDUCATION/TRAINING PROGRAM

## 2022-06-08 PROCEDURE — 87491 CHLMYD TRACH DNA AMP PROBE: CPT | Performed by: STUDENT IN AN ORGANIZED HEALTH CARE EDUCATION/TRAINING PROGRAM

## 2022-06-08 PROCEDURE — 99214 OFFICE O/P EST MOD 30 MIN: CPT | Mod: 25 | Performed by: STUDENT IN AN ORGANIZED HEALTH CARE EDUCATION/TRAINING PROGRAM

## 2022-06-08 NOTE — PROGRESS NOTES
Preceptor Attestation:   Patient seen, evaluated and discussed with the resident. I have verified the content of the note, which accurately reflects my assessment of the patient and the plan of care.   Supervising Physician:  Nelly Katz MD     Per [facility] record review  - US at [facility] 4/28/22 prior to MAB confirmed IUP with fetal pole.   - Opted for bhcg follow-up. First Beta 14,000, 4 days later 636, with greater than 80% drop indicating complete AB.

## 2022-06-09 LAB
C TRACH DNA SPEC QL PROBE+SIG AMP: NEGATIVE
N GONORRHOEA DNA SPEC QL NAA+PROBE: NEGATIVE

## 2022-08-15 ENCOUNTER — OFFICE VISIT (OUTPATIENT)
Dept: DERMATOLOGY | Facility: CLINIC | Age: 22
End: 2022-08-15
Payer: COMMERCIAL

## 2022-08-15 DIAGNOSIS — L24.9 IRRITANT DERMATITIS: ICD-10-CM

## 2022-08-15 DIAGNOSIS — L70.0 ACNE VULGARIS: Primary | ICD-10-CM

## 2022-08-15 PROCEDURE — 99214 OFFICE O/P EST MOD 30 MIN: CPT | Performed by: PHYSICIAN ASSISTANT

## 2022-08-15 RX ORDER — TRETINOIN 1 MG/G
CREAM TOPICAL AT BEDTIME
Qty: 45 G | Refills: 3 | Status: SHIPPED | OUTPATIENT
Start: 2022-08-15 | End: 2023-09-12

## 2022-08-15 RX ORDER — DAPSONE 50 MG/G
GEL TOPICAL
Qty: 60 G | Refills: 3 | Status: SHIPPED | OUTPATIENT
Start: 2022-08-15 | End: 2022-11-01

## 2022-08-15 ASSESSMENT — PAIN SCALES - GENERAL: PAINLEVEL: NO PAIN (0)
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
## 2022-08-15 NOTE — NURSING NOTE
Dermatology Rooming Note    Kolby Arriaga's goals for this visit include:   Chief Complaint   Patient presents with     Acne     Rocha is here to follow up for acne.     Selma Vasquez, Facilitator

## 2022-08-19 ENCOUNTER — TELEPHONE (OUTPATIENT)
Dept: DERMATOLOGY | Facility: CLINIC | Age: 22
End: 2022-08-19

## 2022-08-22 NOTE — TELEPHONE ENCOUNTER
Central Prior Authorization Team   Phone: [phone]    PA Initiation    Medication: dapsone 5 % topical gel   Insurance Company: [facility] - Phone [phone] Fax [phone]  Pharmacy Filling the Rx: [id number] [place], [place] - [address] AT [facility]  Filling Pharmacy Phone: [phone]  Filling Pharmacy Fax:    Start Date: 8/22/2022

## 2022-08-23 NOTE — TELEPHONE ENCOUNTER
Prior Authorization Approval    Authorization Effective Date: 5/24/2022  Authorization Expiration Date: 8/22/2023  Medication: dapsone 5 % topical gel   Approved Dose/Quantity:   Reference #:     Insurance Company: [employer] - Phone [phone] Fax [phone]  Expected CoPay:       CoPay Card Available:      Foundation Assistance Needed:    Which Pharmacy is filling the prescription (Not needed for infusion/clinic administered): [id number] - [facility] [address] AT [address]  Pharmacy Notified: Yes  Patient Notified: Yes

## 2022-09-06 ENCOUNTER — HOSPITAL ENCOUNTER (EMERGENCY)
Facility: CLINIC | Age: 22
Discharge: LEFT BEFORE DISCHARGE | End: 2022-09-06
Payer: COMMERCIAL

## 2022-09-06 VITALS
OXYGEN SATURATION: 99 % | BODY MASS INDEX: 23.3 KG/M2 | HEART RATE: 74 BPM | WEIGHT: 145 LBS | RESPIRATION RATE: 16 BRPM | HEIGHT: 66 IN | SYSTOLIC BLOOD PRESSURE: 116 MMHG | TEMPERATURE: 97.6 F | DIASTOLIC BLOOD PRESSURE: 72 MMHG

## 2022-09-07 NOTE — ED TRIAGE NOTES
Triage Assessment     Row Name 09/06/22 [id number]       Triage Assessment (Adult)    Airway WDL WDL       Respiratory WDL    Respiratory WDL WDL       Skin Circulation/Temperature WDL    Skin Circulation/Temperature WDL WDL       Cardiac WDL    Cardiac WDL WDL       Peripheral/Neurovascular WDL    Peripheral Neurovascular WDL WDL       Cognitive/Neuro/Behavioral WDL    Cognitive/Neuro/Behavioral WDL WDL            Pt. States having intermittant frontal headache for the last few weeks. Pt. Has slight swelling to forehead after running area. Pt. Is alert and orient. times 3.

## 2022-09-18 ENCOUNTER — HEALTH MAINTENANCE LETTER (OUTPATIENT)
Age: 22
End: 2022-09-18

## 2022-11-01 ENCOUNTER — OFFICE VISIT (OUTPATIENT)
Dept: FAMILY MEDICINE | Facility: CLINIC | Age: 22
End: 2022-11-01
Payer: COMMERCIAL

## 2022-11-01 VITALS
DIASTOLIC BLOOD PRESSURE: 72 MMHG | BODY MASS INDEX: 22.21 KG/M2 | OXYGEN SATURATION: 99 % | SYSTOLIC BLOOD PRESSURE: 107 MMHG | TEMPERATURE: 98 F | WEIGHT: 138.2 LBS | HEART RATE: 89 BPM | HEIGHT: 66 IN | RESPIRATION RATE: 16 BRPM

## 2022-11-01 DIAGNOSIS — R93.89 ENDOMETRIAL THICKENING ON ULTRASOUND: ICD-10-CM

## 2022-11-01 DIAGNOSIS — L68.0 HIRSUTISM: ICD-10-CM

## 2022-11-01 DIAGNOSIS — Z71.85 VACCINE COUNSELING: ICD-10-CM

## 2022-11-01 DIAGNOSIS — N92.1 MENOMETRORRHAGIA: Primary | ICD-10-CM

## 2022-11-01 DIAGNOSIS — L70.0 CYSTIC ACNE: ICD-10-CM

## 2022-11-01 PROCEDURE — 99214 OFFICE O/P EST MOD 30 MIN: CPT | Mod: GC | Performed by: STUDENT IN AN ORGANIZED HEALTH CARE EDUCATION/TRAINING PROGRAM

## 2022-11-01 RX ORDER — TRETINOIN 1 MG/G
1 CREAM TOPICAL DAILY
COMMUNITY
Start: 2022-08-15 | End: 2022-11-01

## 2022-11-01 RX ORDER — DAPSONE 50 MG/G
GEL TOPICAL
COMMUNITY
Start: 2022-08-15 | End: 2022-11-01

## 2022-11-01 ASSESSMENT — ANXIETY QUESTIONNAIRES
3. WORRYING TOO MUCH ABOUT DIFFERENT THINGS: NOT AT ALL
GAD7 TOTAL SCORE: 0
7. FEELING AFRAID AS IF SOMETHING AWFUL MIGHT HAPPEN: NOT AT ALL
5. BEING SO RESTLESS THAT IT IS HARD TO SIT STILL: NOT AT ALL
2. NOT BEING ABLE TO STOP OR CONTROL WORRYING: NOT AT ALL
1. FEELING NERVOUS, ANXIOUS, OR ON EDGE: NOT AT ALL
6. BECOMING EASILY ANNOYED OR IRRITABLE: NOT AT ALL
IF YOU CHECKED OFF ANY PROBLEMS ON THIS QUESTIONNAIRE, HOW DIFFICULT HAVE THESE PROBLEMS MADE IT FOR YOU TO DO YOUR WORK, TAKE CARE OF THINGS AT HOME, OR GET ALONG WITH OTHER PEOPLE: NOT DIFFICULT AT ALL
GAD7 TOTAL SCORE: 0

## 2022-11-01 ASSESSMENT — PATIENT HEALTH QUESTIONNAIRE - PHQ9: 5. POOR APPETITE OR OVEREATING: NOT AT ALL

## 2022-11-01 NOTE — PATIENT INSTRUCTIONS
Patient Education   Here is the plan from today's visit    1. Menometrorrhagia  Lab only visit during beginning of period (first 1-14 days)  Get ultrasound within the first 10 days of having your period  (So lab and US around the same time)   See me and we can discuss all the lab results and US results together  I think you could have PCOS (polycystic ovarian syndrome)     If you can, keep your own personal diary to track periods so we can review the timing of your periods together. I want to see how regular they are.     - TSH with free T4 reflex; Future  - US Pelvic Complete with Transvaginal; Future  - Testosterone total; Future  - 17 OH progesterone; Future  - Prolactin; Future  - CBC with Platelets and Reflex to Iron Studies; Future    2. Hirsutism  Can be part of PCOS (more hairs)    3. Cystic acne  After we get hormone labs done - we'll discuss spironolactone again.       Please call or return to clinic if your symptoms don't go away.    Follow up plan  Return in about 4 weeks (around 11/29/2022) for Follow up, with me discuss lab / US results .    Thank you for coming to [facility] today.  Lab Testing:  **If you had lab testing today and your results are reassuring or normal they will be mailed to you or sent through [employer] within 7 days.   **If the lab tests need quick action we will call you with the results.  **If you are having labs done on a different day, please call [phone] to schedule at [facility] or [phone] for other [facility] locations. Labs do not offer walk-in appointments.  The phone number we will call with results is # [phone] (home) . If this is not the best number please call our clinic and change the number.  Medication Refills:  If you need any refills please call your pharmacy and they will contact us.   If you need to  your refill at a new pharmacy, please contact the new pharmacy directly. The new pharmacy will help you get your  medications transferred faster.   Scheduling:  If you have any concerns about today's visit or wish to schedule another appointment please call our office during normal business hours [phone] (8-5:00 M-F)  If a referral was made to an [facility] specialty provider and you do not get a call from central scheduling, please refer to directions on your visit summary or call our office during normal business hours for assistance.   If a Mammogram was ordered for you at the [facility] call [phone] to schedule or change your appointment.  If you had an XRay/CT/Ultrasound/MRI ordered the number is [phone] to schedule or change your radiology appointment.   [facility] has limited ultrasound appointments available on Wednesdays, if you would like your ultrasound at [facility], please call [phone] to schedule.   Medical Concerns:  If you have urgent medical concerns please call [phone] at any time of the day.    Carola Plascencia, DO

## 2022-11-01 NOTE — PROGRESS NOTES
Assessment & Plan     Menometrorrhagia  Hirsutism  Concern for possible PCOS  Left ovary measuring 4.1 x 2.7 x 2.2 cm.   Pt with painful and slightly heavier periods for a long time with hirsutism. Unclear if peroids are regular or not, does seem regular in the past few months. Based on [place] criteria, pt  meets 2 criteria: has signs of hyperandrogenism (cystic acne, hirsutism) as well as findings on transvaginal US of PCOS (total volume of left ovary > 10 ml (~12 ml), diameter >2). Recommend keeping diary tracker of periods to assess regularity of periods. However, it seems likely pt has PCOS based on meeting 2 of the 3 criteria. Of note pt does have family history of PCOS (sister). Will order total testosterone and 17 OH progesterone (needs to be done in early follicular phase) to r/o nonclassic congenital adrenal hyperplasia (NCCAH) due to 21-hydroxylase deficiency.  If serum testosterone >150 ng/dL require evaluation for the most serious causes of hyperandrogenism (ovarian and adrenal androgen-secreting tumors). TSH to r/o ypothyroidism, hyperthyroidism, and prolactin to r/o hyperprolactinemia. Consider treatment options and screening if r/o other causes (if PCOS screening fatty liver disease, sleep apnea, and diabetes (cardiovascular risk factor)).   - TSH with free T4 reflex  - Testosterone total  - 17 OH progesterone  - Prolactin  - CBC with Platelets and Reflex to Iron Studies  - US Pelvic Complete with Transvaginal  - Follicle stimulating hormone  - Will consider pelvic exam at next visit (due for pap)    Endometrial thickening on US  US from 6/8/2022 shows Mildly heterogeneous appearance of the uterus with endometrial  thickness at the upper limits of normal. This is favored to besecondary to secretory phase. Recommend follow-up in 3-6 months within the first 10 days of the patients period. No prior pap history.   - Will repeat US evaluation (see above)   - Instructed patient on the timing that it  "needs to be done                                                     Cystic acne  Follows with dermatology. Is not using the dapsone; is using the [place] a few times a week (finds it very drying).   I offered restarting Spironolactone 50 mg daily for patient, would like to re-address pregnancy prevention with patient if patient is interested in restarting as this medication is contraindicated in pregnancy. Previously has tried: minocycline 100 mg BID, doxycycline 100 mg BID, tretinoin 0.05% cream, Clindamycin 1% lotion, BPO 10% wash, Spironolactone 50 mg daily.     Vaccine counseling   Pt declined COVID-19, HPV, and influenza vaccine. Will consider HPV in the future.      Review of external notes as documented elsewhere in note  Review of the result(s) of each unique test - see HPI  Ordering of each unique test       Return in about 4 weeks (around 2022) for Follow up, with me discuss lab / US results .    Milvia Plascencia,   Family Medicine PGY-3  [facility], [facility]   Pronouns: She/Hers    Subjective   Aj is a 21 year old, presenting for the following health issues:  Abnormal Bleeding Problem (Had an  in April, was pregnant for 6 weeks. Since the  has had a lot of abnormal bleeding/changes to menses. LMP 10/09/22. More premenstrual symptoms./Lumps in breasts come back around menstrual cycles, having swollen lymph nodes in pelvic area during menses. /Holding a bent position gets twinging pain in low abdomen on bilateral sides. ) and Derm Problem (Itching and \"cystic\" since April. Very sensitive now)      HPI     Hormone concern  Had short pregnancy and medication  -  through    FD LMP 10/9/2022   Premenstrual symptoms more prolonged - breast tenderness goes on for a while   Ibuprofen and tylenol during period   Painful crampy periods, happens a lot pre period  Mood - normal no fluctuations  No chance could be pregnant   Feels " acne get worse now   Feels everything is sensitive   Have more hair on chin area, more recently   Sister with PCOS   Mother with endometriosis   Maternal grandmother with fibroids    STI screening - declines  Want to be pregnant in the next year?  No    Combination pill in teens - acne didn't improve, maybe worsened   Has history of migraine with aura as a child   Currently no longer has an aura   No history of blood clots  Sister has blood clots, clotting disorder? Chromosomal thing ?  She hasn't gotten tested   Progesterone only option - took oral pill (acne got worse)     Has dermatologist for acne  Saw derm a couple months ago   Tretinoin   Was on abx for a while    Health Maintenance:  COVID-19 vaccine - not today   HPV vaccine - Maybe not, grew up in [place]   Flu  - not today   Pap - Nope has not had one     Pelvic transvaginal US 6/8/2022:  FINDINGS:  The uterus measures 6.0 x 3.0 x 6.0 cm, and there is no evidence of a  focal fibroid.  The endometrium at the upper limits of normal  measuring 16 mm. Heterogeneity of the uterus with questionable mild  increased flow versus artifact. Tiny 3 x 3 x 5 mm cystic area within  the endometrium. Trace free fluid on the pelvis.     The right ovary measures 3.0 x 1.2 x 4.5 cm and the left ovary  measures 4.1 x 2.7 x 2.2 cm. Likely degenerating follicle of the left  ovary measuring 1.4 x 1.8 x 1.5 cm. There is no adnexal mass. There is  normal blood flow to the ovaries.     Fold likely visualized within the bladder wall.                                                              IMPRESSION:  Mildly heterogeneous appearance of the uterus with endometrial  thickness at the upper limits of normal. This is favored to be  secondary to secretory phase. Recommend follow-up in 3-6 months within  the first 10 days of the patients period.     Review of Systems   Constitutional, HEENT, cardiovascular, pulmonary, gi and gu systems are negative, except as otherwise noted.     "  Objective    /72   Pulse 89   Temp 98  F (36.7  C) (Oral)   Resp 16   Ht 1.676 m (5' 6\")   Wt 62.7 kg (138 lb 3.2 oz)   LMP 10/09/2022 (Exact Date)   SpO2 99%   BMI 22.31 kg/m    Body mass index is 22.31 kg/m .     Physical Exam   General: Alert and oriented, in no acute distress.  Skin: Warm and dry, cystic erythematous nodules across face and chin. Signs of recently shaved stubble on chin.   Eyes: Extra-ocular muscles grossly intact, pupils equal.  ENT: Speech intact, nasal passages open, no hearing impairment noted.  Neck: no LAD, no thyromegaly, no mass, trachea midline  Heart: S1S2 RRR no m/r/g/c/l/t   Lungs: CTAB no chest wall deformity and asymmetry no w/r/r  Neuro: Gait and station normal, comprehension intact. Gross and fine motor skills intact.   Psychiatric: Mood euthymic.   Extremities: Warm, able to move all four extremities at will.    Office Visit on 06/08/2022   Component Date Value Ref Range Status     hCG Urine Qualitative 06/08/2022 Negative  Negative Final    This test is for screening purposes.  Results should be interpreted along with the clinical picture.  Confirmation testing is available if warranted by ordering [id number], HCG Quantitative Pregnancy.     Chlamydia Trachomatis 06/08/2022 Negative  Negative Final    Negative for C. trachomatis rRNA by transcription mediated amplification.   A negative result by transcription mediated amplification does not preclude the presence of infection because results are dependent on proper and adequate collection, absence of inhibitors and sufficient rRNA to be detected.     Neisseria gonorrhoeae 06/08/2022 Negative  Negative Final    Negative for N. gonorrhoeae rRNA by transcription mediated amplification. A negative result by transcription mediated amplification does not preclude the presence of C. trachomatis infection because results are dependent on proper and adequate collection, absence of inhibitors and sufficient rRNA to be " detected.     Trichomonas 06/08/2022 Absent  Absent Final     Yeast 06/08/2022 Absent  Absent Final     Clue Cells 06/08/2022 Absent  Absent Final     WBCs/high power field 06/08/2022 None  None Final     Color Urine 06/08/2022 Yellow  Colorless, Straw, Light Yellow, Yellow Final     Appearance Urine 06/08/2022 Clear  Clear Final     Glucose Urine 06/08/2022 Negative  Negative mg/dL Final     Bilirubin Urine 06/08/2022 Negative  Negative Final     Ketones Urine 06/08/2022 Negative  Negative mg/dL Final     Specific Gravity Urine 06/08/2022 1.015  1.005 - 1.030 Final     Blood Urine 06/08/2022 Negative  Negative Final     pH Urine 06/08/2022 6.0  5.0 - 8.0 Final     Protein Albumin Urine 06/08/2022 Negative  Negative mg/dL Final     Urobilinogen Urine 06/08/2022 0.2  0.2, 1.0 E.U./dL Final     Nitrite Urine 06/08/2022 Negative  Negative Final     Leukocyte Esterase Urine 06/08/2022 Small (A)  Negative Final     Bacteria Urine 06/08/2022 None Seen  None Seen /HPF Final     RBC Urine 06/08/2022 0-2  0-2 /HPF /HPF Final     WBC Urine 06/08/2022 0-5  0-5 /HPF /HPF Final     Squamous Epithelials Urine 06/08/2022 Moderate (A)  None Seen /LPF Final

## 2022-11-16 ENCOUNTER — LAB (OUTPATIENT)
Dept: LAB | Facility: CLINIC | Age: 22
End: 2022-11-16
Payer: COMMERCIAL

## 2022-11-16 ENCOUNTER — ANCILLARY PROCEDURE (OUTPATIENT)
Dept: ULTRASOUND IMAGING | Facility: CLINIC | Age: 22
End: 2022-11-16
Attending: FAMILY MEDICINE
Payer: COMMERCIAL

## 2022-11-16 DIAGNOSIS — N92.1 MENOMETRORRHAGIA: ICD-10-CM

## 2022-11-16 LAB
ERYTHROCYTE [DISTWIDTH] IN BLOOD BY AUTOMATED COUNT: 11.9 % (ref 10–15)
HCT VFR BLD AUTO: 37.7 % (ref 35–53)
HGB BLD-MCNC: 12.2 G/DL (ref 11.7–17.7)
HOLD SPECIMEN: NORMAL
MCH RBC QN AUTO: 30.7 PG (ref 26.5–33)
MCHC RBC AUTO-ENTMCNC: 32.4 G/DL (ref 31.5–36.5)
MCV RBC AUTO: 95 FL (ref 78–100)
PLATELET # BLD AUTO: 334 10E3/UL (ref 150–450)
RBC # BLD AUTO: 3.97 10E6/UL (ref 3.8–5.9)
TSH SERPL DL<=0.005 MIU/L-ACNC: 2.34 UIU/ML (ref 0.3–4.2)
WBC # BLD AUTO: 5.3 10E3/UL (ref 4–11)

## 2022-11-16 PROCEDURE — 76830 TRANSVAGINAL US NON-OB: CPT | Mod: GC | Performed by: RADIOLOGY

## 2022-11-16 PROCEDURE — 76856 US EXAM PELVIC COMPLETE: CPT | Mod: GC | Performed by: RADIOLOGY

## 2022-11-16 PROCEDURE — 85027 COMPLETE CBC AUTOMATED: CPT

## 2022-11-16 PROCEDURE — 83550 IRON BINDING TEST: CPT

## 2022-11-16 PROCEDURE — 84443 ASSAY THYROID STIM HORMONE: CPT

## 2022-11-16 PROCEDURE — 83540 ASSAY OF IRON: CPT

## 2022-11-16 PROCEDURE — 82728 ASSAY OF FERRITIN: CPT

## 2022-11-16 PROCEDURE — 83001 ASSAY OF GONADOTROPIN (FSH): CPT

## 2022-11-16 PROCEDURE — 84146 ASSAY OF PROLACTIN: CPT

## 2022-11-16 PROCEDURE — 36415 COLL VENOUS BLD VENIPUNCTURE: CPT

## 2022-11-16 PROCEDURE — 84403 ASSAY OF TOTAL TESTOSTERONE: CPT

## 2022-11-16 PROCEDURE — 83498 ASY HYDROXYPROGESTERONE 17-D: CPT

## 2022-11-17 LAB
FERRITIN SERPL-MCNC: 29 NG/ML (ref 6–409)
FSH SERPL IRP2-ACNC: 7.1 MIU/ML (ref 1.5–134.8)
IRON BINDING CAPACITY (ROCHE): 258 UG/DL (ref 240–430)
IRON SATN MFR SERPL: 14 % (ref 15–46)
IRON SERPL-MCNC: 35 UG/DL (ref 37–157)
PROLACTIN SERPL 3RD IS-MCNC: 22 NG/ML (ref 4–23)

## 2022-11-21 LAB — TESTOST SERPL-MCNC: 20 NG/DL (ref 8–950)

## 2022-11-23 LAB — 17OHP SERPL-MCNC: 34 NG/DL

## 2022-12-01 ENCOUNTER — OFFICE VISIT (OUTPATIENT)
Dept: FAMILY MEDICINE | Facility: CLINIC | Age: 22
End: 2022-12-01
Payer: COMMERCIAL

## 2022-12-01 VITALS
TEMPERATURE: 98.1 F | DIASTOLIC BLOOD PRESSURE: 69 MMHG | BODY MASS INDEX: 22.4 KG/M2 | HEIGHT: 66 IN | OXYGEN SATURATION: 99 % | HEART RATE: 80 BPM | RESPIRATION RATE: 16 BRPM | SYSTOLIC BLOOD PRESSURE: 102 MMHG | WEIGHT: 139.4 LBS

## 2022-12-01 DIAGNOSIS — Z30.09 BIRTH CONTROL COUNSELING: ICD-10-CM

## 2022-12-01 DIAGNOSIS — E28.2 PCOS (POLYCYSTIC OVARIAN SYNDROME): Primary | ICD-10-CM

## 2022-12-01 DIAGNOSIS — Z11.59 ENCOUNTER FOR HCV SCREENING TEST FOR LOW RISK PATIENT: ICD-10-CM

## 2022-12-01 DIAGNOSIS — L70.0 ACNE VULGARIS: ICD-10-CM

## 2022-12-01 DIAGNOSIS — Z71.85 VACCINE COUNSELING: ICD-10-CM

## 2022-12-01 DIAGNOSIS — Z00.00 HEALTH CARE MAINTENANCE: ICD-10-CM

## 2022-12-01 LAB
CHOLEST SERPL-MCNC: 118 MG/DL
FASTING STATUS PATIENT QL REPORTED: NO
GLUCOSE SERPL-MCNC: 92 MG/DL (ref 70–99)
HDLC SERPL-MCNC: 42 MG/DL
LDLC SERPL CALC-MCNC: 65 MG/DL
NONHDLC SERPL-MCNC: 76 MG/DL
TRIGL SERPL-MCNC: 56 MG/DL

## 2022-12-01 PROCEDURE — 99214 OFFICE O/P EST MOD 30 MIN: CPT | Mod: GC | Performed by: STUDENT IN AN ORGANIZED HEALTH CARE EDUCATION/TRAINING PROGRAM

## 2022-12-01 PROCEDURE — 80061 LIPID PANEL: CPT | Performed by: STUDENT IN AN ORGANIZED HEALTH CARE EDUCATION/TRAINING PROGRAM

## 2022-12-01 PROCEDURE — 36415 COLL VENOUS BLD VENIPUNCTURE: CPT | Performed by: STUDENT IN AN ORGANIZED HEALTH CARE EDUCATION/TRAINING PROGRAM

## 2022-12-01 PROCEDURE — 82947 ASSAY GLUCOSE BLOOD QUANT: CPT | Performed by: STUDENT IN AN ORGANIZED HEALTH CARE EDUCATION/TRAINING PROGRAM

## 2022-12-01 PROCEDURE — 83525 ASSAY OF INSULIN: CPT | Performed by: STUDENT IN AN ORGANIZED HEALTH CARE EDUCATION/TRAINING PROGRAM

## 2022-12-01 PROCEDURE — 86803 HEPATITIS C AB TEST: CPT | Performed by: STUDENT IN AN ORGANIZED HEALTH CARE EDUCATION/TRAINING PROGRAM

## 2022-12-01 RX ORDER — SPIRONOLACTONE 25 MG/1
25 TABLET ORAL DAILY
Qty: 30 TABLET | Refills: 1 | Status: SHIPPED | OUTPATIENT
Start: 2022-12-01 | End: 2023-01-30

## 2022-12-01 NOTE — PATIENT INSTRUCTIONS
If you want to go ahead with progesterone IUD please my chart me! I will place referral and send Ativan to your regular pharmacy.     You will need to get here very early (start of afternoon or morning clinic so 8 am or 1 pm) - make sure the  brings you back to sign all the consent form stuff. Then you'll be able to take the Ativan (Please bring it with you to clinic); obviously discuss it with the doctor doing your IUD. Do NOT take the Ativan before you sign the consent forms! Or we can't do the procedure. You'll have to wait until your appointment here so bring a book or something to do while you wait. Take ibuprofen as well before you come into clinic.

## 2022-12-01 NOTE — PROGRESS NOTES
Assessment & Plan     PCOS (polycystic ovarian syndrome)  Menometrorrhagia  Hirsutism  Pt with US findings consistent with PCOS (see below, both follicle numbers and volume of ovaries) as well as signs of hyperandrogenism (hormonal cystic acne, hursutism). Reviewed labs: 17 OH progesterone wnl, r/o NCCAH. Testosterone 20, r/o (ovaraian and adrenal secreting tumors). TSH wnl r/o hypothyroid and prolactin normal to r/o hyperprolinemia. Pt meets criteria for PCOS. With that in mind screens negative for sleep apnea today. Will evaluated lipid panel in light of higher cardiovascular risk as well as insulin resistance. Pt fasting today and will be able to get both labs. For treatment - no current desire for pregnancy, periods are painful, heavy. Considering BC options, see below. Hirustiusm and acne will start Phillip. If insulin resistance consider Metformin, pt has tolerated before (was on it for prevention of weight gain while on antipsychotic).   - Lipid panel reflex to direct LDL Non-fasting  - Glucose  - Insulin level    Birth Control Counseling  Still debating her options; counseled on BC methods today. Has hx of migraine w/ aura so no combined options. Thinking most about progesterone IUD for localized hormones. Discussed if wants IUD will MyChart me - plan would be to place here, ativan before hand, knows she needs to be here early to sign consent forms before her appointment. Would recommend later in day procedure clinic appointment time. Has plan B at home.     Acne vulgaris  Sees dermatology as well for hormonal acne. Has been on Spironolactone before, worked very well (took 50 mg). Discussed it is a teratogen and the importance of not becoming pregnant while on this medication. Continue topical tretinoin. Due to softer blood pressure will start on 25 mg, discussed monitoring for side effects. Previously has tried: minocycline 100 mg BID, doxycycline 100 mg BID, tretinoin 0.05% cream, Clindamycin 1% lotion,  BPO 10% wash, Spironolactone 50 mg daily.   - spironolactone (ALDACTONE) 25 MG tablet  Dispense: 30 tablet; Refill: 1    Encounter for HCV screening test for low risk patient  - Hepatitis C Screen Reflex to HCV RNA Quant and Genotype    Vaccine counseling   Healthcare maintenance  Pt declined COVID-19, HPV, and influenza vaccine. Will consider HPV in the future. Pap with IUD placement or with me next visit.     Review of the result(s) of each unique test - previous lipid panel, prolactin, 17 OH progesterone, total tesosterone, transvaginal US  Ordering of each unique test  Prescription drug management     Return in about 4 weeks (around 12/29/2022) for Follow up, with me.    Milvia Plascencia DO  Family Medicine PGY-3  [facility], [facility]   Pronouns: She/Hers    Subjective   Aj is a 22 year old, presenting for the following health issues:  Follow Up (Present for HRT follow up) and Results (Review symptoms and ultrasound results)      HPI     PCOS:  Hirsutism w/ transvaginal US results diagnosis of PCOS    Transvaginal US:  The right ovary measures 4.9 x 2.1 x 2.6 cm (volume: 14 mL) and the  left ovary measures 3.5 x 3.2 x 2.9 cm (volume: 17 mL). There is no  adnexal mass. Numerous small follicles in the ovaries (>20 in the  right ovary, right greater than left). There is normal blood flow to  the ovaries.                                                    IMPRESSION:   1. Normal endometrial thickness.  2. Enlarged ovaries with numerous small follicles, suggestive of  polycystic ovarian morphology. Recommend correlation with clinical and  endocrinologic features to confirm PCOS.    Labs:  17-OH progesterone - wnl   Prolactin - wnl   Testosterone total - 20, not elevated   TSH - 2.34   Lipid panel in the past (2019, with LDL of 75)  Sleep apnea: No snoring, no excessive daytime sleepiness     Birth control  / Period management   - Mini pill thought acne went way worse  - Combination  "pill, has migraines as a kid, did have visual aura   - No partners with sperm right now   - If anything would consider progesterone IUD  - Plan-B has at home     Wants to start therese today  Worked great for acne  Understands teratogen     Iron deficiency:  Labs:  Hgb 12.2   Iron 35, iron stat 14, TIBC 258, ferritin 29     Health maintenance:  Flu - declines  COVID - declines  HPV  - declines  History of bad vaccine as kid   Tdap - declines   Hep C  - yes   Pap - do want in future     Review of Systems   Constitutional, HEENT, cardiovascular, pulmonary, gi and gu systems are negative, except as otherwise noted.      Objective    /69   Pulse 80   Temp 98.1  F (36.7  C) (Oral)   Resp 16   Ht 1.676 m (5' 6\")   Wt 63.2 kg (139 lb 6.4 oz)   LMP 10/09/2022 (Exact Date)   SpO2 99%   BMI 22.50 kg/m    Body mass index is 22.5 kg/m .     Physical Exam   General: Alert and oriented, in no acute distress.  Skin: Warm and dry, cystic erythematous nodules across face and chin. Signs of recently shaved stubble on chin.   Eyes: Extra-ocular muscles grossly intact, pupils equal.  ENT: Speech intact, nasal passages open, no hearing impairment noted.  Neck: no LAD, no thyromegaly, no mass, trachea midline  Heart: S1S2 RRR no m/r/g/c/l/t   Lungs: CTAB no chest wall deformity and asymmetry no w/r/r  Neuro: Gait and station normal, comprehension intact. Gross and fine motor skills intact.   Psychiatric: Mood euthymic.   Extremities: Warm, able to move all four extremities at will.    Lab on 2022   Component Date Value Ref Range Status     TSH 2022 2.34  0.30 - 4.20 uIU/mL Final     Testosterone Total 2022 20  8 - 950 ng/dL Final     17 OH Progesterone 2022 34  <=630 ng/dL Final    Children:   infants may exceed 630 ng/dL; however it is uncommon to see levels reach 1000 ng/dL.    Term infants, 0-28 days: <630 ng/dL. Levels fall from  (<630 ng/dL) to prepubertal gradually within 6 months. "     Female Reference Range:  <100 ng/dL Prepubertal  <80 ng/dL Follicular  <285 ng/dL Luteal  < 51 ng/dL Postmenopausal    Male Reference Range:  <110 ng/dL Prepubertal  <220 ng/dL Adult       Prolactin 11/16/2022 22  4 - 23 ng/mL Final    Reference Ranges  0 - 1 mo : 1-470 ng/mL  1 mo - 1 yr : 5-60 ng/mL  1 yr - 19 yr: 3-25 ng/mL    Male:  19 yr and older   4-15 ng/mL    Female (non-pregnant):  19 yr and older  5-23 ng/mL     FSH 11/16/2022 7.1  1.5 - 134.8 mIU/mL Final    FEMALE:  Age  0-1 years: 1.6-19 mIU/mL  1-9 years: 0.7-5.8 mIU/mL  9-12 years: 0.5-7.6 mIU/mL  12-19 years: 0.9-9.1 mIU/mL    19 years and older:   Follicular phase: 3.5-12.5 mIU/mL   Ovulation phase: 4.7-21.5 mIU/mL   Luteal phase: 1.7-7.7 mIU/mL   Postmenopause: 25.8-134.8 mIU/mL     MALE:  Age  0-1 years: 0.1-3.2 mIU/mL  1-9 years: 0.2-2.1 mIU/mL  9-12 years: 0.4-4.2 mIU/mL  12-19 years: 0.9-7.1 mIU/mL  19 years and older: 1.5-12.4 mIU/mL     WBC Count 11/16/2022 5.3  4.0 - 11.0 10e3/uL Final     RBC Count 11/16/2022 3.97  3.80 - 5.90 10e6/uL Final    Sex Specific Reference Ranges:    Female  3.80-5.20  10e6/uL  Male    4.40-5.90  10e6/uL         Hemoglobin 11/16/2022 12.2  11.7 - 17.7 g/dL Final    Sex Specific Reference Ranges:    Female  11.7-15.7  g/dL  Male    13.3-17.7   g/dL       Hematocrit 11/16/2022 37.7  35.0 - 53.0 % Final    Sex Specific Reference Ranges:     Female  35.0-47.0 %   Male      40.0-53.0 %       MCV 11/16/2022 95  78 - 100 fL Final     MCH 11/16/2022 30.7  26.5 - 33.0 pg Final     MCHC 11/16/2022 32.4  31.5 - 36.5 g/dL Final     RDW 11/16/2022 11.9  10.0 - 15.0 % Final     Platelet Count 11/16/2022 334  150 - 450 10e3/uL Final     Hold Specimen 11/16/2022 JI   Final     Iron 11/16/2022 35 (L)  37 - 157 ug/dL Final     Iron Sat Index 11/16/2022 14 (L)  15 - 46 % Final     Iron Binding Capacity 11/16/2022 258  240 - 430 ug/dL Final     Ferritin 11/16/2022 29  6 - 409 ng/mL Final    Male Reference Range:  7 Months-10  Years   6-111 ng/mL  10-18 Years          ng/mL  18 Years and up      ng/mL      Female Reference Range:  7 Months-18 Years   8-115 ng/mL  18-51 Years         6-175 ng/mL  51 Years and up      ng/mL

## 2022-12-02 LAB
HCV AB SERPL QL IA: NONREACTIVE
INSULIN SERPL-ACNC: 8.4 UU/ML (ref 2.6–24.9)
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
## 2023-01-09 ENCOUNTER — OFFICE VISIT (OUTPATIENT)
Dept: DERMATOLOGY | Facility: CLINIC | Age: 23
End: 2023-01-09
Payer: COMMERCIAL

## 2023-01-09 VITALS — SYSTOLIC BLOOD PRESSURE: 103 MMHG | HEART RATE: 97 BPM | DIASTOLIC BLOOD PRESSURE: 73 MMHG

## 2023-01-09 DIAGNOSIS — L70.0 ACNE VULGARIS: Primary | ICD-10-CM

## 2023-01-09 PROCEDURE — 99214 OFFICE O/P EST MOD 30 MIN: CPT | Performed by: PHYSICIAN ASSISTANT

## 2023-01-09 RX ORDER — SPIRONOLACTONE 25 MG/1
25 TABLET ORAL DAILY
Qty: 90 TABLET | Refills: 1 | Status: SHIPPED | OUTPATIENT
Start: 2023-01-09 | End: 2023-04-11

## 2023-01-09 ASSESSMENT — PAIN SCALES - GENERAL: PAINLEVEL: NO PAIN (0)
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
## 2023-01-09 NOTE — NURSING NOTE
Dermatology Rooming Note    Kolby Arriaga's goals for this visit include:   Chief Complaint   Patient presents with     Derm Problem     Follow-up for acne.      Jonnie Wan, EMT-B

## 2023-01-27 NOTE — PROGRESS NOTES
Assessment & Plan      Nodulocystic acne  Sees dermatology as well for hormonal acne. Has been on Spironolactone before, worked very well (took 50 mg). Re-discussed that it is a teratogen and the importance of not becoming pregnant while on this medication. Continue topical tretinoin. Due to softer blood pressure will continue with 25 mg, discussed monitoring for side effects; pt did buy blood pressure cuff reviewed together. Previously has tried: minocycline 100 mg BID, doxycycline 100 mg BID, tretinoin 0.05% cream, Clindamycin 1% lotion, BPO 10% wash, Spironolactone 50 mg daily.   - Continue Spironolactone 25 mg daily   - Continue to follow-up with dermatology (3-4 months)    PCOS (polycystic ovarian syndrome)  Menometrorrhagia  Hirsutism  Pt with US findings consistent with PCOS (both follicle numbers and volume of ovaries) as well as signs of hyperandrogenism (hormonal cystic acne, hursutism). Historical labs: 17 OH progesterone wnl, r/o NCCAH. Testosterone 20, r/o (ovaraian and adrenal secreting tumors). TSH wnl r/o hypothyroid and prolactin normal to r/o hyperprolinemia. Today reviewed recent lab work-up; lipid panel wnl, JOSLYN-IR: 1.91 (any value usually above 2 indicates resistance), QUICKI: 0.35 (potential insulin resistance). Discussed that these results show a mixed picture but likely if any low insulin resistance (again this does fit picture of PCOS). At this point in time don't think Metformin is indicated; but discussed warning signs for diabetes as well as re-screening at a later date (5 years) or if her symptoms change. Pt does not know if they desire pregnancy in the future but did review that it could be harder to get pregnant w/ PCOS and to ensure she knows to talk to any future (if wanted) prenatal care provider regarding PCOS diagnosis. For now patient does NOT want to be pregnant and is on a teratogenic medication (therese) so is using methods to prevent pregnancy.  Considering BC options, see  "below. Hirustiusm and acne will start Phillip. At this point we've established her diagnosis of PCOS and we're here for future screening if necessary and to support her with this syndrome.     Birth control counseling  Still debating her options. Discussed her worries with progesterone only IUD as below. Has hx of migraine w/ aura so no combined options. Has plan B at home for an emergency (pt does not want to be pregnant, on a teratogen medication), plans on using condoms if needed for now; knows can reach out to me if wants help scheduling IUD placement here (would want Ativan).      Iron deficiency  Pt with labs consistent with iron deficiency but no anemia. Discussed the \"geeta iron fish\" with the patient to add small amounts of iron to her diet as well as iron rich foods. She also takes an iron pill occasionally. Discussed can take it every other day (has issues with constipation / GI side effects).     Vaccine counseling   Healthcare maintenance  Pt declined COVID-19, HPV, and influenza vaccine. Will consider HPV in the future. Discussed pap more with patient today and walked through steps of pap. She will schedule when she is ready.       Review of external notes as documented elsewhere in note  Review of the result(s) of each unique test - see below in HPI    Return for follow-up: pap smear .    Milvia Plascencia DO  Family Medicine PGY-3  [facility], [facility]   Pronouns: She/Hers    Subjective   Aj is a 22 year old, presenting for the following health issues:  RECHECK (No PAP/No Immun)      HPI     Acne:  Restarted Spironolactone 25 mg daily - last visit, little over a month   Tretinoin 0.1% cream  Saw dermatology 1/9/2023 to re-establish care  Got blood pressure cuff high 90s low 100s systolic   /73 today   Didn't take Phillip the last week   When was on 50 of phillip as on prazosin   Best results were with the phillip   Encouraged hydration     Reviewed documentation 1/9/2023 " "from dermatology   1. Acne vulgaris  -current: tretinoin 0.1% cream, spironolactone 25 mg daily  - prior: minocycline 100 mg BID, doxycycline 100 mg BID, tretinoin 0.05% cream, Clindamycin 1% lotion, BPO 10% wash, spironolactone 50 mg daily, dapsone 5% gel,   2. Irritant dermatitis, right neck  - current: triamcinolone 0.025% ointment  # PMHx: PCOS  # Acne vulgaris, with hormonal flares on the lower face, prior and during menses. Patient has an appointment with an OBGYN in late January to discuss a hormonal IUD.   - Continue tretinoin 0.1% cream at bedtime. Patient is tolerating this well.  - Continue spironolactone 25 mg daily. BP checked today. Discussed with patient that medication will need to be stopped if pregnancy is desired or if patient begins having intercourse with patient with sperm without a form of birth control in place.     PCOS  Lipids - wnl   JOSLYN-IR: 1.91 (any value usually above 2 indicates resistance)  QUICKI: 0.35    QUICKI index >0.45 - you are probably healthy;   QUICKI index between 0.30-0.45 - you might be insulin resistant   QUICKI index <0.30 - you might be diabetic.  Sleep study -     STOP BANG 0   Fertility plans - no desire for pregnancy right now     Low iron  Labs 11/16/2022  Iron 35 low  Iron binding capacity 258  Iron sat 14 low  Hgb was 5.3   Iron supplements - made her constipated   Showed geeta fish     Pap:  Has not had a pap before  Is contemplative about it - not today   Progesterone IUD still thinking about it   Heavily thinking about it just nervous about it maybe making acne worse, worried about pain w/ insertion, worry about changes w/ period     Declines vaccine today    Review of Systems   Constitutional, HEENT, cardiovascular, pulmonary, gi and gu systems are negative, except as otherwise noted.      Objective    /73   Pulse 71   Temp 98.7  F (37.1  C)   Resp 19   Ht 1.676 m (5' 6\")   Wt 60.8 kg (134 lb)   SpO2 99%   BMI 21.63 kg/m    Body mass index is " 21.63 kg/m .     Physical Exam   General: Alert and oriented, in no acute distress.  Skin: Warm and dry, nodulocystic acne across the face worse on cheeks, extending to neck   Eyes: Extra-ocular muscles grossly intact, pupils equal.  ENT: Speech intact, nasal passages open, no hearing impairment noted.  CV: S1 S2 RRR. No murmur. No cyanosis or pallor, warm and well perfused.  Respiratory: CTAB. No w/r/r. No respiratory distress, no accessory muscle use.  Neuro: Gait and station normal, comprehension intact. Gross and fine motor skills intact.   Psychiatric: Mood and affect appear normal.   Extremities: Warm, able to move all four extremities at will.    Office Visit on 12/01/2022   Component Date Value Ref Range Status     Cholesterol 12/01/2022 118  <200 mg/dL Final     Triglycerides 12/01/2022 56  <150 mg/dL Final     Direct Measure HDL 12/01/2022 42  >=40 mg/dL Final     LDL Cholesterol Calculated 12/01/2022 65  <=100 mg/dL Final     Non HDL Cholesterol 12/01/2022 76  <130 mg/dL Final     Glucose 12/01/2022 92  70 - 99 mg/dL Final     Patient Fasting > 8hrs? 12/01/2022 No   Final     Insulin 12/01/2022 8.4  2.6 - 24.9 uU/mL Final     Hepatitis C Antibody 12/01/2022 Nonreactive  Nonreactive Final

## 2023-01-30 ENCOUNTER — OFFICE VISIT (OUTPATIENT)
Dept: FAMILY MEDICINE | Facility: CLINIC | Age: 23
End: 2023-01-30
Payer: COMMERCIAL

## 2023-01-30 VITALS
RESPIRATION RATE: 19 BRPM | WEIGHT: 134 LBS | HEIGHT: 66 IN | BODY MASS INDEX: 21.53 KG/M2 | SYSTOLIC BLOOD PRESSURE: 106 MMHG | TEMPERATURE: 98.7 F | OXYGEN SATURATION: 99 % | HEART RATE: 71 BPM | DIASTOLIC BLOOD PRESSURE: 73 MMHG

## 2023-01-30 DIAGNOSIS — L68.0 HIRSUTISM: ICD-10-CM

## 2023-01-30 DIAGNOSIS — E28.2 PCOS (POLYCYSTIC OVARIAN SYNDROME): ICD-10-CM

## 2023-01-30 DIAGNOSIS — N92.1 MENOMETRORRHAGIA: ICD-10-CM

## 2023-01-30 DIAGNOSIS — Z71.85 VACCINE COUNSELING: ICD-10-CM

## 2023-01-30 DIAGNOSIS — E61.1 IRON DEFICIENCY: ICD-10-CM

## 2023-01-30 DIAGNOSIS — Z00.00 HEALTHCARE MAINTENANCE: ICD-10-CM

## 2023-01-30 DIAGNOSIS — Z30.09 BIRTH CONTROL COUNSELING: ICD-10-CM

## 2023-01-30 DIAGNOSIS — L70.0 NODULOCYSTIC ACNE: Primary | ICD-10-CM

## 2023-01-30 PROCEDURE — 99214 OFFICE O/P EST MOD 30 MIN: CPT | Mod: GC | Performed by: STUDENT IN AN ORGANIZED HEALTH CARE EDUCATION/TRAINING PROGRAM

## 2023-01-30 NOTE — PATIENT INSTRUCTIONS
[email or web address]    Iron rich foods    Iron pills (every other day is fine)

## 2023-02-24 ENCOUNTER — TELEPHONE (OUTPATIENT)
Dept: DERMATOLOGY | Facility: CLINIC | Age: 23
End: 2023-02-24
Payer: COMMERCIAL

## 2023-02-24 NOTE — TELEPHONE ENCOUNTER
Lvtucker,1st attempt, informed patient that her appointment for Wednesday May 31st with Ester Aj needs to be rescheduled. Ester Aj will not be in clinic at that time. Please call [phone] to reschedule.

## 2023-03-03 ENCOUNTER — MYC MEDICAL ADVICE (OUTPATIENT)
Dept: FAMILY MEDICINE | Facility: CLINIC | Age: 23
End: 2023-03-03
Payer: COMMERCIAL

## 2023-03-03 DIAGNOSIS — L70.0 CYSTIC ACNE: Primary | ICD-10-CM

## 2023-03-03 RX ORDER — SPIRONOLACTONE 25 MG/1
25 TABLET ORAL 2 TIMES DAILY
Qty: 180 TABLET | Refills: 0 | Status: SHIPPED | OUTPATIENT
Start: 2023-03-03 | End: 2023-04-11

## 2023-04-10 ENCOUNTER — MYC MEDICAL ADVICE (OUTPATIENT)
Dept: FAMILY MEDICINE | Facility: CLINIC | Age: 23
End: 2023-04-10
Payer: COMMERCIAL

## 2023-04-10 DIAGNOSIS — L70.0 NODULOCYSTIC ACNE: Primary | ICD-10-CM

## 2023-04-11 RX ORDER — SPIRONOLACTONE 50 MG/1
50 TABLET, FILM COATED ORAL DAILY
Qty: 90 TABLET | Refills: 1 | Status: SHIPPED | OUTPATIENT
Start: 2023-04-11 | End: 2023-09-12

## 2023-04-30 DIAGNOSIS — L70.0 ACNE VULGARIS: ICD-10-CM

## 2023-05-03 RX ORDER — SPIRONOLACTONE 25 MG/1
TABLET ORAL
Qty: 30 TABLET | Refills: 3 | OUTPATIENT
Start: 2023-05-03

## 2023-05-30 ENCOUNTER — APPOINTMENT (OUTPATIENT)
Dept: URGENT CARE | Facility: CLINIC | Age: 23
End: 2023-05-30
Payer: COMMERCIAL

## 2023-07-30 ENCOUNTER — HEALTH MAINTENANCE LETTER (OUTPATIENT)
Age: 23
End: 2023-07-30

## 2023-09-12 ENCOUNTER — OFFICE VISIT (OUTPATIENT)
Dept: FAMILY MEDICINE | Facility: CLINIC | Age: 23
End: 2023-09-12
Payer: COMMERCIAL

## 2023-09-12 VITALS
TEMPERATURE: 98.6 F | HEIGHT: 65 IN | BODY MASS INDEX: 22.66 KG/M2 | RESPIRATION RATE: 16 BRPM | SYSTOLIC BLOOD PRESSURE: 99 MMHG | DIASTOLIC BLOOD PRESSURE: 68 MMHG | OXYGEN SATURATION: 100 % | WEIGHT: 136 LBS | HEART RATE: 80 BPM

## 2023-09-12 DIAGNOSIS — E28.2 PCOS (POLYCYSTIC OVARIAN SYNDROME): Primary | ICD-10-CM

## 2023-09-12 DIAGNOSIS — M25.50 MULTIPLE JOINT PAIN: ICD-10-CM

## 2023-09-12 DIAGNOSIS — L70.0 NODULOCYSTIC ACNE: ICD-10-CM

## 2023-09-12 DIAGNOSIS — F41.9 ANXIETY: ICD-10-CM

## 2023-09-12 DIAGNOSIS — L70.0 ACNE VULGARIS: ICD-10-CM

## 2023-09-12 PROCEDURE — 99214 OFFICE O/P EST MOD 30 MIN: CPT | Mod: GC

## 2023-09-12 RX ORDER — HYDROXYZINE HYDROCHLORIDE 25 MG/1
50 TABLET, FILM COATED ORAL 3 TIMES DAILY PRN
Qty: 60 TABLET | Refills: 1 | Status: SHIPPED | OUTPATIENT
Start: 2023-09-12 | End: 2023-12-21

## 2023-09-12 RX ORDER — TRETINOIN 0.1 %
CREAM (GRAM) TOPICAL AT BEDTIME
Qty: 45 G | Refills: 3 | OUTPATIENT
Start: 2023-09-12

## 2023-09-12 RX ORDER — SPIRONOLACTONE 50 MG/1
50 TABLET, FILM COATED ORAL DAILY
Qty: 90 TABLET | Refills: 1 | Status: SHIPPED | OUTPATIENT
Start: 2023-09-12 | End: 2023-12-17

## 2023-09-12 RX ORDER — TRETINOIN 1 MG/G
CREAM TOPICAL AT BEDTIME
Qty: 45 G | Refills: 3 | Status: SHIPPED | OUTPATIENT
Start: 2023-09-12

## 2023-09-12 NOTE — PATIENT INSTRUCTIONS
Patient Education   Here is the plan from today's visit    1. Acne vulgaris  - tretinoin (RETIN-A) 0.1 % external cream; Apply topically At Bedtime  Dispense: 45 g; Refill: 3    2. Nodulocystic acne  - spironolactone (ALDACTONE) 50 MG tablet; Take 1 tablet (50 mg) by mouth daily  Dispense: 90 tablet; Refill: 1    4. Anxiety  - hydrOXYzine (ATARAX) 25 MG tablet; Take 2 tablets (50 mg) by mouth 3 times daily as needed for itching  Dispense: 60 tablet; Refill: 1    5. Joint pain  I recommend scheduling an OMT appointment at the  at [facility] for your pain and tension.          Please call or return to clinic if your symptoms don't go away.    Follow up plan  Return for Routine preventive.    Thank you for coming to [facility] today.  Lab Testing:  **If you had lab testing today and your results are reassuring or normal they will be mailed to you or sent through [email or web address] within 7 days.   **If the lab tests need quick action we will call you with the results.  **If you are having labs done on a different day, please call [phone] to schedule at [facility] or [phone] for other [facility] locations. Labs do not offer walk-in appointments.  The phone number we will call with results is # [phone] (home) . If this is not the best number please call our clinic and change the number.  Medication Refills:  If you need any refills please call your pharmacy and they will contact us.   If you need to  your refill at a new pharmacy, please contact the new pharmacy directly. The new pharmacy will help you get your medications transferred faster.   Scheduling:  If you have any concerns about today's visit or wish to schedule another appointment please call our office during normal business hours [phone] (8-5:00 M-F). If you can no longer make a scheduled visit, please cancel via [email or web address] or call us to cancel.   If a referral was made to an [facility] specialty provider  and you do not get a call from central scheduling, please refer to directions on your visit summary or call our office during normal business hours for assistance.   If a Mammogram was ordered for you at the Breast Center call [phone] to schedule or change your appointment.  If you had an XRay/CT/Ultrasound/MRI ordered the number is [phone] to schedule or change your radiology appointment.   [facility] has limited ultrasound appointments available on Wednesdays, if you would like your ultrasound at [facility], please call [phone] to schedule.   Medical Concerns:  If you have urgent medical concerns please call [phone] at any time of the day.    Rosemary Gonsales MD

## 2023-09-12 NOTE — PROGRESS NOTES
Assessment & Plan     Multiple joint pain  Presenting with multiple joint pain of knees, hips, and shoulders as well as tense/tight muscles in their neck. Reports significant anxiety/time of stress in their life--going through a divorce and lots of tension at home. Having a tendency to crack their joints which is exacerbating their pain. Pain worsens with activity/towards end of the day. Differential includes autoimmune/RA (no fevers, night sweats, FH hx, rashes, swelling/redness in joints), Lyme (no known tick exposures, no rashes), joint hypermobility (not supported on exam), excessive activity (not consistent with history). Overall no red flag symptoms. Presentation most consistent with increased stress being held in the body during this stressful time, paired with exacerbation of pain in the joints by tendency to crack them.  - OMT referral  - Recommend therapy for anxiety/stressful life circumstance, patient has a therapist in mind already  - Return precautions given, if symptoms worsening recommend rheumatologic workup (ESR, CRP, RF, CCP, CBC) as well as PT    Anxiety  Per chart review, significant trauma history and hisotry of hospitalization for possible psychosis in 2019, at that time was prescribed zyprexa and klonapin. Not currently on any medications. Discussed interventions to help manage anxiety, patient has a therapist in mind they are planning to establish with. Discussed indication for selective serotonin reuptake inhibitor, patient not interested at this time. Will start with prn hydroxizine, I do think given her significant psych/trauma history as well as current life stressor of divorce, she may require escalated therapy--possibly prn ativan, or nightly zyprexa.  - hydrOXYzine (ATARAX) 25 MG tablet; Take 2 tablets (50 mg) by mouth 3 times daily as needed for itching    Nodulocystic acne  Stable, refill provided  - spironolactone (ALDACTONE) 50 MG tablet; Take 1 tablet (50 mg) by mouth daily  -  "tretinoin (RETIN-A) 0.1 % external cream; Apply topically At Bedtime    Return for Routine preventive. Or prn for symptoms above    Rosemary Gonsales MD  [facility] RENEA Wade is a 22 year old, presenting for the following health issues:  Recheck Medication (Follow up. Spirolactone. Side effects of bone and nerve pain. Unsure if it's from the medications)        9/12/2023     2:26 PM   Additional Questions   Roomed by khushi   Accompanied by self       HPI     Bene having bone pain, ahces, and poopping in their joints, in knees, hips, and neck. Started     Has had popping bones for a few months, but been getting worse.  Feeling like they need to crack and then get a sharp pain with that. Feels like their knnes are clicking.    Typically they walk, probably about a mile today. Did play sports growing up, baseketball in [employer].     No fevers, night sweats, no rashes  No camping, time in nature, no ticks  Joints aren't swollen or red    No FH of autoimmune disease    Feeling nerve pain \"from anxiety\"    The more they are active the worse it is      Objective    BP 99/68   Pulse 80   Temp 98.6  F (37  C) (Oral)   Resp 16   Ht 1.651 m (5' 5\")   Wt 61.7 kg (136 lb)   LMP 09/08/2023 (Exact Date)   SpO2 100%   BMI 22.63 kg/m    Body mass index is 22.63 kg/m .  Physical Exam   Constitutional: awake, alert, cooperative, no apparent distress, and appears stated age  Eyes: Lids and lashes normal, pupils equal, round and reactive to light, extra ocular muscles intact, sclera clear, conjunctiva normal  ENT: normocepalic, without obvious abnormality  Hematologic / Lymphatic: no cervical lymphadenopathy and no supraclavicular lymphadenopathy  Respiratory: No increased work of breathing, good air exchange, clear to auscultation bilaterally, no crackles or wheezing  Cardiovascular: Normal apical impulse, regular rate and rhythm, normal S1 and S2, no S3 or S4, and no murmur noted  GI: " soft, non-distended and non-tender  Musculoskeletal: no lower extremity pitting edema present  full range of motion noted  Neurologic: Awake, alert, oriented to name, place and time.  Cranial nerves II-XII are grossly intact.  Motor is 5 out of 5 bilaterally.  Sensory is intact.    Shoulder exam:  Inspection: no swelling, bruising, discoloration, or obvious deformity or asymmetry  Tender: acromion  Range of Motion  Active:forward flexion normal, abduction  normal, external rotation  normal, scapular winging/scapular motion asymmetry L>R  Strength: rotator cuff strength full  Special tests:  Negative: Neers, Hernandez, and Positive painful arc  e is normal    Knee exam:    Inspection: AP/lateral alignment normal  Tender: nontender  Active Range of Motion: full flexion, full extension  Strength: quad  5/5 and Hamstrings  5/5  Special tests: normal Valgus stress test, positive Valgus stress test, negative Lachman's test, negative Joseph's     Also examined: hip full range of motion, no tenderness or erythema        ----- Service Performed and Documented by Resident or Fellow ------

## 2023-09-26 ENCOUNTER — OFFICE VISIT (OUTPATIENT)
Dept: FAMILY MEDICINE | Facility: CLINIC | Age: 23
End: 2023-09-26
Payer: COMMERCIAL

## 2023-09-26 VITALS
RESPIRATION RATE: 16 BRPM | HEART RATE: 92 BPM | DIASTOLIC BLOOD PRESSURE: 66 MMHG | TEMPERATURE: 97.9 F | HEIGHT: 65 IN | WEIGHT: 136 LBS | OXYGEN SATURATION: 99 % | SYSTOLIC BLOOD PRESSURE: 97 MMHG | BODY MASS INDEX: 22.66 KG/M2

## 2023-09-26 DIAGNOSIS — M54.9 UPPER BACK PAIN: Primary | ICD-10-CM

## 2023-09-26 DIAGNOSIS — M99.07 SEGMENTAL AND SOMATIC DYSFUNCTION OF UPPER EXTREMITY: ICD-10-CM

## 2023-09-26 DIAGNOSIS — M99.02 SEGMENTAL AND SOMATIC DYSFUNCTION OF THORACIC REGION: ICD-10-CM

## 2023-09-26 PROCEDURE — 98925 OSTEOPATH MANJ 1-2 REGIONS: CPT | Mod: GC

## 2023-09-26 PROCEDURE — 99213 OFFICE O/P EST LOW 20 MIN: CPT | Mod: 25

## 2023-09-26 NOTE — PROGRESS NOTES
"HPI      Sheri is a 22 year old who presents today for Osteopathic Evaluation.   Chief Complaint   Patient presents with    Pain     Hip, shoulder, lower back pain scale 3     Presents to the clinic for OMT evaluation.  States that they have pain in the lower back, shoulders, and knees.  They are currently going through divorce and experiencing a great deal of stress.    All active medical problems, med list, PMHx, and social Hx updated and reviewed.    No Known Allergies  Review of Systems       ROS      10 point ROS neg other than the symptoms noted above here or in the HPI.    Physical Exam     Vitals:    09/26/23 1430   BP: 97/66   Pulse: 92   Resp: 16   Temp: 97.9  F (36.6  C)   TempSrc: Oral   SpO2: 99%   Weight: 61.7 kg (136 lb)   Height: 1.651 m (5' 5\")       Physical Exam  General: Alert and oriented, in no acute distress.  Skin: Warm and dry, no abnormalities noted.  Eyes: Extra-ocular muscles intact, pupils equal and reactive.  ENT: Speech intact, nasal passages open, no hearing impairment noted.  Neck: Supple, no swelling noted.  CV: No cyanosis or pallor, warm and well perfused.  Respiratory: No respiratory distress, no accessory muscle use.  Neuro: Gait and station normal, comprehension intact. Gross and fine motor skills intact.   Psychiatric: Mood and affect appear normal.   Extremities: Warm, able to move all four extremities at will.    Osteopathic Structural Exam and additional MSK exam found below in OMT Procedure Note.  Assessment and Plan     (M54.9) Upper back pain  (primary encounter diagnosis)  (M99.02) Segmental and somatic dysfunction of thoracic region  (M99.07) Segmental and somatic dysfunction of upper extremity  Comment: Given that this has been interfering with the patient's quality of life and ADLs, after discussion, informed consent, and medical assessment for safety, we have together decided to address this concern with Osteopathic Manipulative Treatment. Please see OMT Procedure " Note below for the specifics of treatment.  Plan: OSTEOPATHIC MANIP,1-2 BODY REGN          Niurka Ayala DO, MPH     OMT PROCEDURE NOTE    Body Region: T-spine and Ribs  Somatic Dysfunction: Tender points of bilateral rhomboids, T1 extended, rotated left, side bent left  Treatment: Direct Myofascial Release, Counterstrain, and Soft Tissue Techniques.   Outcome: Improved    Body Region: Shoulder, UE and/or Hand  Somatic Dysfunction: Tender point of right supraspinatus and trapezius  Treatment: Direct Myofascial Release and Soft Tissue Techniques.  Outcome: Stable    The patient actively participated in OMT and was able to communicate both positive and negative feedback throughout. OMT completed without incident. Patient tolerated treatment well. Patient reported that ROM, function, and/or pain level were improved. Advised that pain is occasionally worse during the first 24 hours after treatment and that drinking more water and taking Tylenol or Ibuprofen often help. Patient to return in 2 week/s or as needed for repeat osteopathic assessment.     Niurka Ayala DO, MPH

## 2023-10-03 NOTE — PATIENT INSTRUCTIONS
Patient Education   Here is the plan from today's visit    1. Upper back pain    2. Segmental and somatic dysfunction of thoracic region  - OSTEOPATHIC MANIP,1-2 BODY REGN    3. Segmental and somatic dysfunction of upper extremity  - OSTEOPATHIC MANIP,1-2 BODY REGN    Please call or return to clinic if your symptoms don't go away.    Follow up plan  Return if symptoms worsen or fail to improve.    Thank you for coming to [facility] today.  Lab Testing:  **If you had lab testing today and your results are reassuring or normal they will be mailed to you or sent through [facility] within 7 days.   **If the lab tests need quick action we will call you with the results.  **If you are having labs done on a different day, please call [phone] to schedule at [facility] or [phone] for other [facility] Outpatient Lab locations. Labs do not offer walk-in appointments.  The phone number we will call with results is # [phone] (home) . If this is not the best number please call our clinic and change the number.  Medication Refills:  If you need any refills please call your pharmacy and they will contact us.   If you need to  your refill at a new pharmacy, please contact the new pharmacy directly. The new pharmacy will help you get your medications transferred faster.   Scheduling:  If you have any concerns about today's visit or wish to schedule another appointment please call our office during normal business hours [phone] (8-5:00 M-F). If you can no longer make a scheduled visit, please cancel via [facility] or call us to cancel.   If a referral was made to an [facility] specialty provider and you do not get a call from central scheduling, please refer to directions on your visit summary or call our office during normal business hours for assistance.   If a Mammogram was ordered for you at the Breast Center call [phone] to schedule or change your appointment.  If you had an  XRay/CT/Ultrasound/MRI ordered the number is [phone] to schedule or change your radiology appointment.   [facility] has limited ultrasound appointments available on Wednesdays, if you would like your ultrasound at [facility], please call [phone] to schedule.   Medical Concerns:  If you have urgent medical concerns please call [phone] at any time of the day.    Niurka Ayala, DO

## 2023-10-13 NOTE — NURSING NOTE
Dermatology Rooming Note    Sheri Fuller's goals for this visit include:   Chief Complaint   Patient presents with     Derm Problem     Rocha is for concerns of acne.         Sarah Kennedy LPN    
caffeine

## 2023-11-07 ENCOUNTER — MYC MEDICAL ADVICE (OUTPATIENT)
Dept: FAMILY MEDICINE | Facility: CLINIC | Age: 23
End: 2023-11-07
Payer: COMMERCIAL

## 2023-11-07 DIAGNOSIS — L70.0 NODULOCYSTIC ACNE: Primary | ICD-10-CM

## 2023-11-08 RX ORDER — SPIRONOLACTONE 100 MG/1
100 TABLET, FILM COATED ORAL DAILY
Qty: 90 TABLET | Refills: 1 | Status: SHIPPED | OUTPATIENT
Start: 2023-11-08 | End: 2024-02-19

## 2023-12-09 ENCOUNTER — TELEPHONE (OUTPATIENT)
Dept: FAMILY MEDICINE | Facility: CLINIC | Age: 23
End: 2023-12-09
Payer: COMMERCIAL
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
## 2023-12-13 ENCOUNTER — TELEPHONE (OUTPATIENT)
Dept: FAMILY MEDICINE | Facility: CLINIC | Age: 23
End: 2023-12-13
Payer: COMMERCIAL
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
## 2023-12-15 ENCOUNTER — OFFICE VISIT (OUTPATIENT)
Dept: FAMILY MEDICINE | Facility: CLINIC | Age: 23
End: 2023-12-15
Payer: COMMERCIAL

## 2023-12-15 VITALS
HEART RATE: 76 BPM | SYSTOLIC BLOOD PRESSURE: 109 MMHG | OXYGEN SATURATION: 95 % | BODY MASS INDEX: 23.3 KG/M2 | DIASTOLIC BLOOD PRESSURE: 71 MMHG | WEIGHT: 140 LBS

## 2023-12-15 DIAGNOSIS — F43.0 ACUTE REACTION TO STRESS: ICD-10-CM

## 2023-12-15 DIAGNOSIS — F31.9 BIPOLAR I DISORDER (H): ICD-10-CM

## 2023-12-15 DIAGNOSIS — F43.21 GRIEF: Primary | ICD-10-CM

## 2023-12-15 PROCEDURE — 99214 OFFICE O/P EST MOD 30 MIN: CPT | Mod: GC

## 2023-12-15 RX ORDER — HYDROXYZINE HYDROCHLORIDE 10 MG/1
10 TABLET, FILM COATED ORAL PRN
COMMUNITY
End: 2023-12-15

## 2023-12-15 RX ORDER — HYDROXYZINE HYDROCHLORIDE 10 MG/1
10 TABLET, FILM COATED ORAL EVERY 8 HOURS PRN
Qty: 90 TABLET | Refills: 3 | Status: SHIPPED | OUTPATIENT
Start: 2023-12-15 | End: 2024-05-16

## 2023-12-15 ASSESSMENT — PATIENT HEALTH QUESTIONNAIRE - PHQ9: SUM OF ALL RESPONSES TO PHQ QUESTIONS 1-9: 24

## 2023-12-17 PROBLEM — F29 PSYCHOSIS (H): Status: RESOLVED | Noted: 2019-05-26 | Resolved: 2023-12-17

## 2023-12-17 NOTE — PATIENT INSTRUCTIONS
Thank you for coming to [facility] today.  Here is the plan from today's visit  Follow up in 1 week  Continue taking hydroxyzine PRN for anxiety  Establish with therapist asap, either previous therapist or one through [email or web address]    Scheduling:  If any referrals were ordered today you should be getting a call in the next week.  If you don't hear about this within a week please call one of the following numbers   If your referral is for [facility] please call [phone]   If your referral is to outside [facility], or if you have issues scheduling, please call the clinic number ([phone]) and ask for a care coordinator during business hours (8-5:00 M-F)  If your referral is for gender care (voice therapy, surgery), please call the Comprehensive Gender Care coordinator at [phone]  If you have any concerns about today's visit or wish to schedule another appointment please call our office during normal business hours [phone] (8-5:00 M-F)    Medication Refills:  If you need any refills please call your pharmacy and they will contact us.   If you need to  your refill at a new pharmacy, please contact the new pharmacy directly. The new pharmacy will help you get your medications transferred faster.     Medical Concerns:  If you have any concerns about today's visit or wish to schedule another appointment  please call our office during normal business hours  [phone] (8-5:00 M-F)  If you have urgent medical concerns please call [phone] at any time of the day.  If you have a medical emergency please call [phone]    Again thank you for choosing [facility] and please let us know how we can best partner with you to improve your and your family's health.    Carina Teixeira MD          Crisis information   If in Immediate danger please go to the ER and/or call 9-1-1  Feeling overwhelmed and just need a supportive person to talk through a struggling time?   Toll Free [phone] or  text  Support  to [phone] (hours 12 PM - 10 PM CST)  The [place] Warmline provides a coqm-ye-exyy approach to mental health recovery, support and wellness. Calls are answered by professionally trained Certified Peer Specialists, who have first hand experience living with a mental health condition. (hours 12 PM - 10 PM CST)    Do you feel like you might be in crisis and potentially need professional services?   National Crisis Lines:  4-740-FKMWBVI (1-854.975.1695) - www.[employer]  6-350-601-TALK (5012) - www.suicidepreventionlifeline.org  [place]:  Crisis Text Line: Text [place] to 622038. Free support at your fingertips 24/7  People who text [place] to 888582 will be connected with a counselor. Crisis Text Line is available 24 hours a day, seven days a week.  [facility] Multilingual Crisis Line:  110.563.8323  [facility]:  COPE - Adult (psychiatric crisis, but cannot transport self): [phone]   COPE - Child: [phone]  Acute Psychiatric Services - [facility] (24 hour crisis walk-in and crisis line): [phone]  [address] [place] [address]  [facility] (Emergency Psychiatric Evaluation): [phone]  [facility]:  [facility] Adult Crisis Line (crisis counseling and walk-in urgent care mental health): [phone]   Adult Residential Crisis Centers:   [employer] operates two Adult Residential Crisis Centers in the area: [facility] ([place]) and [facility] ([place])  These facilities are a step below in-patient hospital care, providing a three to ten-day stay for individuals who are at a moderate but not a high risk for self-harm. The crisis centers and other [facility] programs can be reached through their central screening at [phone].  Domestic Violence:  [place] Domestic Violence Crisis Line (24-hour [place] crisis line) 1-285.538.8697    If in Immediate danger please go to the ER and/or call 9-1-1
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
## 2023-12-21 ENCOUNTER — OFFICE VISIT (OUTPATIENT)
Dept: FAMILY MEDICINE | Facility: CLINIC | Age: 23
End: 2023-12-21
Payer: COMMERCIAL

## 2023-12-21 VITALS
WEIGHT: 135 LBS | HEART RATE: 113 BPM | BODY MASS INDEX: 22.47 KG/M2 | SYSTOLIC BLOOD PRESSURE: 103 MMHG | RESPIRATION RATE: 21 BRPM | OXYGEN SATURATION: 98 % | DIASTOLIC BLOOD PRESSURE: 73 MMHG | TEMPERATURE: 97.7 F

## 2023-12-21 DIAGNOSIS — J32.9 VIRAL SINUSITIS: Primary | ICD-10-CM

## 2023-12-21 DIAGNOSIS — F41.9 ANXIETY: ICD-10-CM

## 2023-12-21 DIAGNOSIS — B97.89 VIRAL SINUSITIS: Primary | ICD-10-CM

## 2023-12-21 LAB
FLUAV RNA SPEC QL NAA+PROBE: NEGATIVE
FLUBV RNA RESP QL NAA+PROBE: NEGATIVE
RSV RNA SPEC NAA+PROBE: NEGATIVE
SARS-COV-2 RNA RESP QL NAA+PROBE: NEGATIVE

## 2023-12-21 PROCEDURE — 87637 SARSCOV2&INF A&B&RSV AMP PRB: CPT | Performed by: FAMILY MEDICINE

## 2023-12-21 PROCEDURE — 99213 OFFICE O/P EST LOW 20 MIN: CPT | Performed by: FAMILY MEDICINE

## 2023-12-21 RX ORDER — HYDROXYZINE HYDROCHLORIDE 25 MG/1
50 TABLET, FILM COATED ORAL 3 TIMES DAILY PRN
Qty: 60 TABLET | Refills: 1 | Status: SHIPPED | OUTPATIENT
Start: 2023-12-21 | End: 2024-05-15

## 2023-12-21 RX ORDER — FLUTICASONE PROPIONATE 50 MCG
1 SPRAY, SUSPENSION (ML) NASAL DAILY
Qty: 15.8 ML | Refills: 1 | Status: SHIPPED | OUTPATIENT
Start: 2023-12-21 | End: 2024-04-22

## 2023-12-21 NOTE — PROGRESS NOTES
Assessment & Plan     Pt with acute upper respiratory syndrome, sinus headache, pressure and postnasal drip most bothersome. COVID swab ordered. No indication of bacterial infection at this time. Rx supportive care, return precautions. Pt taking prn hydroxyzine for anxiety, discussed option to add first gen antihistamine or schedule hyroxyzine for now to help with congestion and also help with increased anxiety at present (see note from 12/15)    Viral sinusitis  - fluticasone (FLONASE) 50 MCG/ACT nasal spray; Spray 1 spray into both nostrils daily  - Symptomatic Influenza A/B, RSV, & SARS-CoV2 PCR (COVID-19) Nose    Anxiety  - hydrOXYzine HCl (ATARAX) 25 MG tablet; Take 2 tablets (50 mg) by mouth 3 times daily as needed for itching      Scheduled on 1/5 to followup on appt from 12/15    Kaleigh Burris DO  [facility] RENEA Wade is a 23 year old, presenting for the following health issues:  Cough      12/21/2023     3:42 PM   Additional Questions   Roomed by cygi   Accompanied by mother       HPI     4d of congestion, cough, rhinorrhea, headache  Has had chills, no measured fever  No SOB. Worse symptoms are frontal sinus headache and congestion.  No known sick contacts, but has been around a lot of people lately  Taking tylenol, advil, and sudafed      Review of Systems   Constitutional, HEENT, cardiovascular, pulmonary, gi and gu systems are negative, except as otherwise noted.      Objective    /73 (BP Location: Left arm, Patient Position: Sitting, Cuff Size: Adult Regular)   Pulse 113   Temp 97.7  F (36.5  C)   Resp 21   Wt 61.2 kg (135 lb)   LMP 12/09/2023   SpO2 98%   BMI 22.47 kg/m    Body mass index is 22.47 kg/m .  Physical Exam   GENERAL: healthy, alert and no distress  EYES: Eyes grossly normal to inspection, PERRL and conjunctivae and sclerae normal  HENT: normal cephalic/atraumatic, both ears: clear effusion, nasal mucosa edematous , oropharynx with  clear postnasal drip, and sinuses: increased sensation of pressure with palpation, no acute pain  NECK: no adenopathy, no asymmetry, masses, or scars and thyroid normal to palpation  RESP: lungs clear to auscultation - no rales, rhonchi or wheezes  CV: regular rate and rhythm, normal S1 S2, no S3 or S4, no murmur, click or rub, no peripheral edema and peripheral pulses strong  MS: no gross musculoskeletal defects noted, no edema

## 2024-01-05 ENCOUNTER — OFFICE VISIT (OUTPATIENT)
Dept: FAMILY MEDICINE | Facility: CLINIC | Age: 24
End: 2024-01-05
Payer: COMMERCIAL

## 2024-01-05 VITALS
HEART RATE: 67 BPM | HEIGHT: 66 IN | WEIGHT: 135 LBS | DIASTOLIC BLOOD PRESSURE: 68 MMHG | BODY MASS INDEX: 21.69 KG/M2 | RESPIRATION RATE: 17 BRPM | SYSTOLIC BLOOD PRESSURE: 101 MMHG | OXYGEN SATURATION: 97 %

## 2024-01-05 DIAGNOSIS — L70.0 NODULOCYSTIC ACNE: ICD-10-CM

## 2024-01-05 DIAGNOSIS — F43.21 GRIEF REACTION: Primary | ICD-10-CM

## 2024-01-05 PROCEDURE — 99213 OFFICE O/P EST LOW 20 MIN: CPT | Mod: GC

## 2024-01-05 PROCEDURE — 84132 ASSAY OF SERUM POTASSIUM: CPT

## 2024-01-05 PROCEDURE — 36415 COLL VENOUS BLD VENIPUNCTURE: CPT

## 2024-01-05 ASSESSMENT — PATIENT HEALTH QUESTIONNAIRE - PHQ9
SUM OF ALL RESPONSES TO PHQ QUESTIONS 1-9: 18
SUM OF ALL RESPONSES TO PHQ QUESTIONS 1-9: 18
10. IF YOU CHECKED OFF ANY PROBLEMS, HOW DIFFICULT HAVE THESE PROBLEMS MADE IT FOR YOU TO DO YOUR WORK, TAKE CARE OF THINGS AT HOME, OR GET ALONG WITH OTHER PEOPLE: VERY DIFFICULT
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
## 2024-01-06 LAB — POTASSIUM SERPL-SCNC: 4.5 MMOL/L (ref 3.4–5.3)

## 2024-01-08 ENCOUNTER — CARE COORDINATION (OUTPATIENT)
Dept: PSYCHOLOGY | Facility: CLINIC | Age: 24
End: 2024-01-08
Payer: COMMERCIAL
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
## 2024-02-12 ENCOUNTER — VIRTUAL VISIT (OUTPATIENT)
Dept: PSYCHOLOGY | Facility: CLINIC | Age: 24
End: 2024-02-12
Attending: FAMILY MEDICINE
Payer: COMMERCIAL

## 2024-02-12 VITALS — HEIGHT: 66 IN | WEIGHT: 135 LBS | BODY MASS INDEX: 21.69 KG/M2

## 2024-02-12 DIAGNOSIS — G47.00 INSOMNIA, UNSPECIFIED TYPE: ICD-10-CM

## 2024-02-12 DIAGNOSIS — F43.21 GRIEF: ICD-10-CM

## 2024-02-12 DIAGNOSIS — Z86.59 HISTORY OF MANIA: ICD-10-CM

## 2024-02-12 DIAGNOSIS — F43.10 POSTTRAUMATIC STRESS DISORDER: ICD-10-CM

## 2024-02-12 DIAGNOSIS — F33.1 MODERATE EPISODE OF RECURRENT MAJOR DEPRESSIVE DISORDER (H): Primary | ICD-10-CM

## 2024-02-12 PROBLEM — F31.9 BIPOLAR I DISORDER (H): Status: ACTIVE | Noted: 2019-06-05

## 2024-02-12 PROBLEM — F42.9 OBSESSIVE-COMPULSIVE DISORDER: Status: RESOLVED | Noted: 2019-02-07 | Resolved: 2024-02-12

## 2024-02-12 PROBLEM — F30.9 MANIA (H): Status: ACTIVE | Noted: 2019-06-05

## 2024-02-12 PROBLEM — F30.9 MANIA (H): Status: RESOLVED | Noted: 2019-06-05 | Resolved: 2019-07-05

## 2024-02-12 PROCEDURE — 90792 PSYCH DIAG EVAL W/MED SRVCS: CPT | Mod: 95 | Performed by: PSYCHIATRY & NEUROLOGY

## 2024-02-12 RX ORDER — OLANZAPINE 5 MG/1
5 TABLET ORAL DAILY PRN
Qty: 20 TABLET | Refills: 0 | Status: SHIPPED | OUTPATIENT
Start: 2024-02-12 | End: 2025-02-11

## 2024-02-12 ASSESSMENT — PATIENT HEALTH QUESTIONNAIRE - PHQ9
SUM OF ALL RESPONSES TO PHQ QUESTIONS 1-9: 4
SUM OF ALL RESPONSES TO PHQ QUESTIONS 1-9: 4
10. IF YOU CHECKED OFF ANY PROBLEMS, HOW DIFFICULT HAVE THESE PROBLEMS MADE IT FOR YOU TO DO YOUR WORK, TAKE CARE OF THINGS AT HOME, OR GET ALONG WITH OTHER PEOPLE: SOMEWHAT DIFFICULT

## 2024-02-12 ASSESSMENT — PAIN SCALES - GENERAL: PAINLEVEL: NO PAIN (0)

## 2024-02-12 NOTE — Clinical Note
Thanks for the referral! Interesting case. I think what she had can be called substance induced romaine instead of bipolar disorder unless she has another episode. I put guidelines for prescribing SSRIs in the note in case she decides she wants to go ahead with this, and gave her emergency as needed olanzapine in case of romaine. Feel free to reach out if needed!   -Peter
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
## 2024-02-12 NOTE — NURSING NOTE
Is the patient currently in the state of [place]? YES    Visit mode:VIDEO    If the visit is dropped, the patient can be reconnected by: VIDEO VISIT: Text to cell phone:   Telephone Information:   Mobile [phone]       Will anyone else be joining the visit? NO  (If patient encounters technical issues they should call [phone])    How would you like to obtain your AVS? MyChart    Are changes needed to the allergy or medication list? No    Reason for visit: Consult    Jailyn ORNELAS

## 2024-02-12 NOTE — PATIENT INSTRUCTIONS
**For crisis resources, please see the information at the end of this document**   Patient Education    Thank you for coming to the [facility].     Lab Testing:  If you had lab testing today and your results are reassuring or normal they will be mailed to you or sent through [employer] within 7 days. If the lab tests need quick action we will call you with the results. The phone number we will call with results is # [phone]. If this is not the best number please call our clinic and change the number.     Medication Refills:  If you need any refills please call your pharmacy and they will contact us. Our fax number for refills is [phone].   Three business days of notice are needed for general medication refill requests.   Five business days of notice are needed for controlled substance refill requests.   If you need to change to a different pharmacy, please contact the new pharmacy directly. The new pharmacy will help you get your medications transferred.     Contact Us:  Please call [phone] during business hours (8-5:00 M-F).   If you have medication related questions after clinic hours, or on the weekend, please call [phone].     Financial Assistance [phone]   Medical Records [phone]       MENTAL HEALTH CRISIS RESOURCES:  For a emergency help, please call 911 or go to the nearest Emergency Department.     Emergency Walk-In Options:   EmPATH Unit @ [facility] ([place]): [phone] - Specialized mental health emergency area designed to be calming  [facility] ([place]): [phone]  [facility] ([place]): [phone]  [facility]): [phone]    County Crisis Information:   [place]: [phone]  [place]: [phone]  [place] ([place]) - Adult: [phone]     Child: [phone]  [place] - Adult: [phone]     Child: [phone]  [place]: [phone]  List of all [facility]  resources:   https://[place].gov/dhs/people-we-serve/adults/health-care/mental-health/resources/crisis-contacts.jsp    National Crisis Information:   Crisis Text Line: Text  [place]  to 435337  Suicide & Crisis Lifeline: 988  National Suicide Prevention Lifeline: 7-211-332-TALK (5-354-694-0991)       For online chat options, visit https://suicidepreventionlifeline.org/chat/  Poison Control Center: 1-636-024-0319  Trans Lifeline: 8-131-128-8748 - Hotline for transgender people of all ages  The [employer] Project: 4-978-137-6343 - Hotline for LGBT youth     For Non-Emergency Support:   Fast Tracker: Mental Health & Substance Use Disorder Resources -   https://www.[employer].org/

## 2024-02-19 ENCOUNTER — OFFICE VISIT (OUTPATIENT)
Dept: DERMATOLOGY | Facility: CLINIC | Age: 24
End: 2024-02-19
Payer: COMMERCIAL

## 2024-02-19 VITALS — SYSTOLIC BLOOD PRESSURE: 99 MMHG | DIASTOLIC BLOOD PRESSURE: 66 MMHG | HEART RATE: 89 BPM

## 2024-02-19 DIAGNOSIS — L70.0 NODULOCYSTIC ACNE: ICD-10-CM

## 2024-02-19 PROCEDURE — 99214 OFFICE O/P EST MOD 30 MIN: CPT | Performed by: PHYSICIAN ASSISTANT

## 2024-02-19 RX ORDER — SPIRONOLACTONE 100 MG/1
TABLET, FILM COATED ORAL
Qty: 90 TABLET | Refills: 1 | Status: SHIPPED | OUTPATIENT
Start: 2024-02-19 | End: 2024-07-08

## 2024-02-19 RX ORDER — SPIRONOLACTONE 25 MG/1
TABLET ORAL
Qty: 90 TABLET | Refills: 1 | Status: SHIPPED | OUTPATIENT
Start: 2024-02-19 | End: 2024-07-08

## 2024-02-19 ASSESSMENT — PAIN SCALES - GENERAL: PAINLEVEL: NO PAIN (0)

## 2024-02-19 NOTE — NURSING NOTE
Dermatology Rooming Note    Kolby Arriaga's goals for this visit include:   Chief Complaint   Patient presents with    Acne     Better than last visit.  Spirnolactone and Tretinoin, seems to be helping.      Natasha Tavarez, CMA
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
## 2024-04-21 DIAGNOSIS — B97.89 VIRAL SINUSITIS: ICD-10-CM

## 2024-04-21 DIAGNOSIS — J32.9 VIRAL SINUSITIS: ICD-10-CM

## 2024-04-22 RX ORDER — FLUTICASONE PROPIONATE 50 MCG
1 SPRAY, SUSPENSION (ML) NASAL DAILY
Qty: 16 G | Refills: 1 | Status: SHIPPED | OUTPATIENT
Start: 2024-04-22

## 2024-04-22 NOTE — TELEPHONE ENCOUNTER
"Request for medication refill:  fluticasone (FLONASE) 50 MCG/ACT nasal spray   Providers if patient needs an appointment and you are willing to give a one month supply please refill for one month and  send a letter/MyChart using \".SMILLIMITEDREFILL\" .smillimited and route chart to \"P SMI \" (Giving one month refill in non controlled medications is strongly recommended before denial)    If refill has been denied, meaning absolutely no refills without visit, please complete the smart phrase \".smirxrefuse\" and route it to the \"P SMI MED REFILLS\"  pool to inform the patient and the pharmacy.    Ruchi Obrien MA     " Subjective   Patient ID: Stefany Glover is a 36 y.o. female who presents for Polycystic Ovary Syndrome (Requesting referral to Endo--also having sinus pressure).    HPI   Stefany presents complaining of sore throat, congestion, sinus pressure, and fatigue. Dry cough. Has been going on for the past week. No fevers or chills. No GI symptoms  Also here for PCOS. Struggling to lose weight despite the dietary changes and exercise.  Drinking more water. Exercising at least 4 days per week.  No change in how clothes fit; actually a little tighter, weight is up, no periods since having IUD.     Review of Systems  All other systems have been reviewed and are negative except as noted in the HPI.       Objective   /66   Pulse 94   Wt 88.5 kg (195 lb)   SpO2 97%   BMI 33.47 kg/m²     Physical Exam  Alert.  No acute distress.  Bilateral TMs show serous effusion.  Nasal mucosa is erythematous and boggy turbinates.  Lungs clear to auscultation bilaterally.  Shotty Cervical lymphadenopathy palpable.    Assessment/Plan   Diagnoses and all orders for this visit:  Acute non-recurrent pansinusitis  -     amoxicillin-pot clavulanate (Augmentin) 875-125 mg tablet; Take 1 tablet (875 mg) by mouth 2 times a day for 7 days.  - Over-the-counter symptomatic relief discussed.  Stay hydrated.  Sleep on side or elevated.  Deep breaths to prevent secondary pneumonia.  Follow-up in 1 week if symptoms persist   PCOS (polycystic ovarian syndrome)  -     Referral to Endocrinology; Future  -     CBC and Auto Differential; Future  -     Comprehensive Metabolic Panel; Future  -     Hemoglobin A1C; Future  -     TSH with reflex to Free T4 if abnormal; Future  -     Testosterone; Future  -     FSH; Future  -     Luteinizing hormone; Future  Vitamin D deficiency  -     Vitamin D 25-Hydroxy,Total (for eval of Vitamin D levels); Future  General medical exam  -     Vitamin D 25-Hydroxy,Total (for eval of Vitamin D levels); Future  Well adult  exam  -     CBC and Auto Differential; Future  -     Comprehensive Metabolic Panel; Future  -     Hemoglobin A1C; Future  -     Lipid Panel; Future  Screening for cholesterol level  -     Lipid Panel; Future

## 2024-05-15 ENCOUNTER — OFFICE VISIT (OUTPATIENT)
Dept: FAMILY MEDICINE | Facility: CLINIC | Age: 24
End: 2024-05-15
Payer: COMMERCIAL

## 2024-05-15 VITALS
DIASTOLIC BLOOD PRESSURE: 62 MMHG | OXYGEN SATURATION: 99 % | HEART RATE: 81 BPM | SYSTOLIC BLOOD PRESSURE: 93 MMHG | HEIGHT: 66 IN | RESPIRATION RATE: 16 BRPM | TEMPERATURE: 98.5 F | BODY MASS INDEX: 21.79 KG/M2

## 2024-05-15 DIAGNOSIS — F41.9 ANXIETY: ICD-10-CM

## 2024-05-15 DIAGNOSIS — R06.00 DYSPNEA, UNSPECIFIED TYPE: Primary | ICD-10-CM

## 2024-05-15 PROCEDURE — 99213 OFFICE O/P EST LOW 20 MIN: CPT | Performed by: FAMILY MEDICINE

## 2024-05-15 NOTE — PROGRESS NOTES
Assessment & Plan     Anxiety  May be exacerbating her symptoms. Restart Hydroxizine prn  - hydrOXYzine HCl (ATARAX) 25 MG tablet; Take 2 tablets (50 mg) by mouth 3 times daily as needed for itching    Dyspnea, unspecified type  May well be related to poor air quality and also reviewed that anxiety might be exacerbating her symptoms - feeding each other.  Reviewed the literature and there is little guidance as to how useful asthma meds are.  Sheri does not carry an asthma diagnosis although has allergies and family history. Exam today without evidence of wheezing or prolonged expiratory phase but she is also feeling quite good.  N95 can help but is also uncomfortable in those with sense of SOB.  I will send her a [employer] message with literature learnings and will decide if a trial of inhaled steroids is reasonable. Would recommend using the hydroxizine for symptoms too, to help if anxiety is playing a component, and to start her flonase regularly and possibly OTC allergy meds.                 No follow-ups on file.    Subjective   Sheri is a 23 year old, presenting for the following health issues:  Cough (Chest tightening, headache, sore throat, difficult breathing)      5/15/2024     3:02 PM   Additional Questions   Roomed by Marisol   Accompanied by self         5/15/2024    Information    services provided? No     HPI     Is very sensitive to bad air quality,   Chest tightness, feels her heart beating in her chest, has to breath through her mouth, headaches, throat will hurt. General blah that day and subsequent days.   Will resolve quickly if there is a good day.   Last summer was really bad - used flonase, tried to keep allergies at bay, humidifier, lots of water and still had bad symptoms. Then did not work outside.  Did not affect her function.    Does come in waves and worst when outside.   Did tried N95 but it didn't help much   Wondering if an inhaler would benefit  As a kid had  "normal seasonal allergies, no asthma.   FHx: little sister has asthma       [place] does part time work at the [employer], so a lot of things outside.   Works out indoors and runs the AC  Lives with her parents and they love to keep the windows open.        MH - not taking the Zyprexa, not for several years.  Given it to her in 2019 when she went psychotic and then diagnosed with bipolar and wondering if accurate. Treated then with Zyprexa.                 Objective    BP 93/62   Pulse 81   Temp 98.5  F (36.9  C) (Oral)   Resp 16   Ht 1.676 m (5' 6\")   LMP 05/03/2024 (Exact Date)   SpO2 99%   BMI 21.79 kg/m    Body mass index is 21.79 kg/m .  Physical Exam   GENERAL: alert and no distress  NECK: no adenopathy, no asymmetry, masses, or scars  RESP: lungs clear to auscultation - no rales, rhonchi or wheezes  CV: regular rate and rhythm, normal S1 S2, no S3 or S4, no murmur, click or rub, no peripheral edema  MS: no gross musculoskeletal defects noted, no edema            Signed Electronically by: Princess Medley MD    "

## 2024-05-16 RX ORDER — HYDROXYZINE HYDROCHLORIDE 25 MG/1
50 TABLET, FILM COATED ORAL 3 TIMES DAILY PRN
Qty: 60 TABLET | Refills: 1 | Status: SHIPPED | OUTPATIENT
Start: 2024-05-16

## 2024-05-16 NOTE — PATIENT INSTRUCTIONS
Patient Education   Here is the plan from today's visit    Bad air symptoms  There were no proven treatments in the literature that I could find. Certainly those with asthma do better when we treat their asthma with inhaled steroids.  Since you don't have known asthma, I can't predict whether an inhaled steroid will help. The main side effect of trying one is possible thrush of the mouth. I think a short trial is certainly not dangerous and has low risk of side effects. Get back to me if you'd like to try one     There is some science behind using N95s - they filter out the air smoke particles.  And... it is not unusual that it feels harder to breath with the mask on, so it makes sense if you don't notice much improvement. So, up to you. The surgical masks really don't help, so wouldn't try that.     I would also make sure to treat your allergies so that is not playing a part. Daily flonase and then either Zyrtec, Claritin or Allegra daily - all over the counter meds.     1. Anxiety  As we discussed, anxiety could get worse and so treating that might help your pounding heart and possibly your throat.  So, I would try that.   - hydrOXYzine HCl (ATARAX) 25 MG tablet; Take 2 tablets (50 mg) by mouth 3 times daily as needed for itching  Dispense: 60 tablet; Refill: 1              Please call or return to clinic if your symptoms don't go away.    Follow up plan  No follow-ups on file.    Thank you for coming to [facility] today.  Lab Testing:  **If you had lab testing today and your results are reassuring or normal they will be mailed to you or sent through [employer] within 7 days.   **If the lab tests need quick action we will call you with the results.  **If you are having labs done on a different day, please call [phone] to schedule at [facility] or [phone] for other [facility] locations. Labs do not offer walk-in appointments.  The phone number we will call with results is #  [phone] (home) . If this is not the best number please call our clinic and change the number.  Medication Refills:  If you need any refills please call your pharmacy and they will contact us.   If you need to  your refill at a new pharmacy, please contact the new pharmacy directly. The new pharmacy will help you get your medications transferred faster.   Scheduling:  If you have any concerns about today's visit or wish to schedule another appointment please call our office during normal business hours [phone] (8-5:00 M-F). If you can no longer make a scheduled visit, please cancel via [employer] or call us to cancel.   If a referral was made to an [facility] specialty provider and you do not get a call from central scheduling, please refer to directions on your visit summary or call our office during normal business hours for assistance.   If a Mammogram was ordered for you at the [facility] call [phone] to schedule or change your appointment.  If you had an XRay/CT/Ultrasound/MRI ordered the number is [phone] to schedule or change your radiology appointment.   [facility] has limited ultrasound appointments available on Wednesdays, if you would like your ultrasound at [facility], please call [phone] to schedule.   Medical Concerns:  If you have urgent medical concerns please call [phone] at any time of the day.    Princess Medley MD

## 2024-07-08 ENCOUNTER — OFFICE VISIT (OUTPATIENT)
Dept: DERMATOLOGY | Facility: CLINIC | Age: 24
End: 2024-07-08
Attending: PHYSICIAN ASSISTANT
Payer: COMMERCIAL

## 2024-07-08 VITALS — SYSTOLIC BLOOD PRESSURE: 111 MMHG | DIASTOLIC BLOOD PRESSURE: 72 MMHG

## 2024-07-08 DIAGNOSIS — L70.0 NODULOCYSTIC ACNE: ICD-10-CM

## 2024-07-08 PROCEDURE — 99214 OFFICE O/P EST MOD 30 MIN: CPT | Performed by: PHYSICIAN ASSISTANT

## 2024-07-08 RX ORDER — SPIRONOLACTONE 25 MG/1
TABLET ORAL
Qty: 90 TABLET | Refills: 1 | Status: SHIPPED | OUTPATIENT
Start: 2024-07-08

## 2024-07-08 RX ORDER — TAZAROTENE 1 MG/G
CREAM TOPICAL
Qty: 60 G | Refills: 3 | Status: SHIPPED | OUTPATIENT
Start: 2024-07-08

## 2024-07-08 RX ORDER — SULFACETAMIDE SODIUM 100 MG/ML
LOTION TOPICAL DAILY
Qty: 118 ML | Refills: 3 | Status: SHIPPED | OUTPATIENT
Start: 2024-07-08

## 2024-07-08 RX ORDER — SPIRONOLACTONE 100 MG/1
TABLET, FILM COATED ORAL
Qty: 90 TABLET | Refills: 1 | Status: SHIPPED | OUTPATIENT
Start: 2024-07-08

## 2024-07-08 ASSESSMENT — PAIN SCALES - GENERAL: PAINLEVEL: NO PAIN (0)

## 2024-07-08 NOTE — NURSING NOTE
"Dermatology Rooming Note    Kolby Arriaga's goals for this visit include:   Chief Complaint   Patient presents with    Acne     Acne recheck- states that her acne is \"back and fourth\" as she notes frequent breakouts        Dedrick HURLEY CMA     "
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
## 2024-07-11 RX ORDER — AMOXICILLIN 500 MG/1
500 CAPSULE ORAL 2 TIMES DAILY
Qty: 60 CAPSULE | Refills: 3 | Status: SHIPPED | OUTPATIENT
Start: 2024-07-11

## 2024-07-13 ENCOUNTER — TELEPHONE (OUTPATIENT)
Dept: DERMATOLOGY | Facility: CLINIC | Age: 24
End: 2024-07-13
Payer: COMMERCIAL

## 2024-07-13 NOTE — TELEPHONE ENCOUNTER
Prior Authorization Retail Medication Request    Medication/Dose: tazarotene (TAZORAC) 0.1 % external cream  Diagnosis and ICD code (if different than what is on RX):    New/renewal/insurance change PA/secondary ins. PA:  Previously Tried and Failed:  see chart  Rationale:  see chart    Insurance   Primary: BLUE PLUS  Insurance ID:  [id number]

## 2024-07-17 NOTE — TELEPHONE ENCOUNTER
PA Initiation    Medication: TAZORAC 0.1 % EX CREA  Insurance Company: [employer] BLUE PLUS - Phone [phone] Fax [phone]  Pharmacy Filling the Rx: [facility] #[id number] [place], [place] - [address] AT [facility]  Filling Pharmacy Phone: [phone]  Filling Pharmacy Fax:    Start Date: 7/17/2024

## 2024-07-18 NOTE — TELEPHONE ENCOUNTER
Prior Authorization Approval    Medication: TAZORAC 0.1 % EX CREA  Authorization Effective Date: 4/19/2024  Authorization Expiration Date: 7/18/2025  Approved Dose/Quantity: 30/30  Reference #: [id number]   Insurance Company: [employer] - Phone [phone] Fax [phone]  Expected CoPay: $    CoPay Card Available:      Financial Assistance Needed:   Which Pharmacy is filling the prescription: [facility] #[id number] - [facility] [address]  Pharmacy Notified: Yes  Patient Notified: Yes

## 2024-07-31 ENCOUNTER — TELEPHONE (OUTPATIENT)
Dept: FAMILY MEDICINE | Facility: CLINIC | Age: 24
End: 2024-07-31

## 2024-07-31 NOTE — TELEPHONE ENCOUNTER
[facility] Medicine Clinic phone call message- general phone call:    Reason for call: Patient has appointment for IUD insert on 08/05 and wants to know if she can get some medication sent in for anxiety and another medication called Cytotec to help open the cervix. Patient uses [facility] #[id number] - [place], [place] - [address] AT [facility] [[id number]    Return call needed: No    OK to leave a message on voice mail? Yes    Primary language: English      needed? No    Call taken on July 31, 2024 at 12:26 PM by Elsa Baxter

## 2024-09-22 ENCOUNTER — HEALTH MAINTENANCE LETTER (OUTPATIENT)
Age: 24
End: 2024-09-22

## 2024-11-13 ENCOUNTER — OFFICE VISIT (OUTPATIENT)
Dept: DERMATOLOGY | Facility: CLINIC | Age: 24
End: 2024-11-13
Payer: COMMERCIAL

## 2024-11-13 VITALS — DIASTOLIC BLOOD PRESSURE: 70 MMHG | SYSTOLIC BLOOD PRESSURE: 104 MMHG

## 2024-11-13 DIAGNOSIS — L70.0 NODULOCYSTIC ACNE: ICD-10-CM

## 2024-11-13 DIAGNOSIS — L70.0 ACNE VULGARIS: Primary | ICD-10-CM

## 2024-11-13 PROCEDURE — 99214 OFFICE O/P EST MOD 30 MIN: CPT | Performed by: PHYSICIAN ASSISTANT

## 2024-11-13 ASSESSMENT — PAIN SCALES - GENERAL: PAINLEVEL_OUTOF10: NO PAIN (0)
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
## 2024-11-13 NOTE — NURSING NOTE
Dermatology Rooming Note    Kolby Arriaga's goals for this visit include:   Chief Complaint   Patient presents with    Derm Problem     Acne- thinks things are going pretty good. Currently having a flare.      CALI Wilson
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
## 2024-11-15 DIAGNOSIS — L70.0 NODULOCYSTIC ACNE: ICD-10-CM

## 2024-11-17 RX ORDER — TAZAROTENE 1 MG/G
CREAM TOPICAL
Qty: 60 G | Refills: 3 | Status: SHIPPED | OUTPATIENT
Start: 2024-11-17

## 2024-11-17 NOTE — PATIENT INSTRUCTIONS
"  Hormonal Treatments for Acne    There are 2 main types of hormonal treatments for acne:     Spironolactone  This medication is actually a blood pressure medication that is used off-label to treat acne because it has effects on a type of testosterone that can worsen acne. This medication is typically taken twice daily and it typically takes months to become effective.     What are the possible side effects of spironolactone?  While many teens have no side effects from this medication, possible side effects include increased urination or light-headedness, so be sure to drink plenty of water. It can also cause breast tenderness or disruption of normal periods. Talk with your dermatologist if any of these symptoms become a problem.     Who should not take spironolactone?  Patients with kidney, adrenal or liver conditions might require blood tests to monitor potassium levels while using spironolactone.  Patients who are pregnant or planning to become pregnant should not take this medication.     Oral Contraceptive Pills (OCPs)   This medication works well for acne because it helps regulate hormones, which is a leading cause of acne in teenage girls and young women. Several OCPs are FDA-approved to treat acne. To start this medication, refer to the package insert. Some girls choose to wait until their next period to start, and others choose to start right away with the \"quick start\" method.   Expect this medication to take about three months to start improving your skin. In the meantime, be sure to use any medicated washes or topical medications that have been recommended for you.     What are the possible Side Effects of OCPs?  Headache, nausea, and breast tenderness can happen, but often get better with continued use. Bleeding between periods, or \"spotting\" can also happen.     Who cannot take OCPs for acne?  If you or a first-degree relative have a history of blood clots, talk to your doctor before starting.  Smoking " "and vaping also increase the risk of blood clots while on OCPs, so these should be avoided. Patients with a history of migraine headaches should also talk to their doctor before starting.     Will I have significant weight gain?  No, there is no data to suggest that large weight gain happens while on OCPs.  Some people experience a small amount of weight gain, while others might have a small amount of weight loss.     Can I take antibiotics while I'm on OCPs?  In general, yes! Only antibiotics in the \"Rifamycin\" class interact with the OCPs and make them less effective.     Do OCPs cause cancer?   OCPs are actually associated with decreased risk of endometrial (uterine) and ovarian cancer for up to 35 years after using them. There is a slight increase in incidence of breast  and cervical cancer in active OCP users, and this risk normalizes 2-5 years after stopping  therapy.       Source: Aissatou OCHOA, Petrona N, Beatrice AL. Hormonal Treatment of Acne and Hidradenitis Suppurativa in Adolescent Patients. Dermatol Clin. 2022 Apr;40(2):167-178. doi: 10.1016/j.det.2021.12.004. Epub 2022 Mar 4. PMID: [id number].    "

## 2025-02-04 ENCOUNTER — OFFICE VISIT (OUTPATIENT)
Dept: MIDWIFE SERVICES | Facility: CLINIC | Age: 25
End: 2025-02-04
Payer: COMMERCIAL

## 2025-02-04 ENCOUNTER — TELEPHONE (OUTPATIENT)
Dept: OBGYN | Facility: CLINIC | Age: 25
End: 2025-02-04

## 2025-02-04 VITALS
BODY MASS INDEX: 22.34 KG/M2 | SYSTOLIC BLOOD PRESSURE: 108 MMHG | DIASTOLIC BLOOD PRESSURE: 78 MMHG | HEIGHT: 66 IN | WEIGHT: 139 LBS

## 2025-02-04 DIAGNOSIS — N92.0 EXCESSIVE AND FREQUENT MENSTRUATION: ICD-10-CM

## 2025-02-04 DIAGNOSIS — N93.9 ABNORMAL UTERINE BLEEDING (AUB): ICD-10-CM

## 2025-02-04 DIAGNOSIS — Z30.09 GENERAL COUNSELING AND ADVICE ON CONTRACEPTIVE MANAGEMENT: Primary | ICD-10-CM

## 2025-02-04 PROCEDURE — G2211 COMPLEX E/M VISIT ADD ON: HCPCS | Performed by: NURSE PRACTITIONER

## 2025-02-04 PROCEDURE — 99204 OFFICE O/P NEW MOD 45 MIN: CPT | Performed by: NURSE PRACTITIONER

## 2025-02-04 ASSESSMENT — ANXIETY QUESTIONNAIRES
6. BECOMING EASILY ANNOYED OR IRRITABLE: SEVERAL DAYS
5. BEING SO RESTLESS THAT IT IS HARD TO SIT STILL: NOT AT ALL
1. FEELING NERVOUS, ANXIOUS, OR ON EDGE: NOT AT ALL
2. NOT BEING ABLE TO STOP OR CONTROL WORRYING: NOT AT ALL
IF YOU CHECKED OFF ANY PROBLEMS ON THIS QUESTIONNAIRE, HOW DIFFICULT HAVE THESE PROBLEMS MADE IT FOR YOU TO DO YOUR WORK, TAKE CARE OF THINGS AT HOME, OR GET ALONG WITH OTHER PEOPLE: SOMEWHAT DIFFICULT
7. FEELING AFRAID AS IF SOMETHING AWFUL MIGHT HAPPEN: NOT AT ALL
GAD7 TOTAL SCORE: 2
GAD7 TOTAL SCORE: 2
3. WORRYING TOO MUCH ABOUT DIFFERENT THINGS: SEVERAL DAYS

## 2025-02-04 ASSESSMENT — PATIENT HEALTH QUESTIONNAIRE - PHQ9
5. POOR APPETITE OR OVEREATING: NOT AT ALL
SUM OF ALL RESPONSES TO PHQ QUESTIONS 1-9: 2

## 2025-02-04 NOTE — TELEPHONE ENCOUNTER
Called patient and left message regarding IUD insertion later this afternoon. Notified that this visit will be a consultation to discuss IUD, pain management and premedications with anxiety. Meds can be prescribed at this appt to return at a later date with  for IUD insert.

## 2025-02-04 NOTE — PROGRESS NOTES
SUBJECTIVE:   Kolby Arriaga is a 24 year old who presents to the clinic for discussion of birth control methods.   She has used the following methods in the past: JOLIE and POP   Today she is interested in discussing NFP, non-hormonal contraception, Mirena IUD and Paragard IUD  Histories reviewed and updated  Past Medical History:   Diagnosis Date    Carmelina (H) 06/05/2019    Hospitalized in 6/2019 for carmelina with psychosis. Instigating factors include family relational conflict, severe sleep deprivation, elevated cannabis use, and severe life stressors. Started on lorazepam 0.5 mg tid and Zyprexa.   Tapered off of Zyprexa in 2021, no longer routinely using cannabis and Benadryl, no recurrence of manic symptoms since that time as of 2024.   Brother with schizoaffective     Migraine with aura     1 per 3 months.    Obsessive-compulsive disorder 02/07/2019    Per patient report. Has seen therapist and psychiatrist in [place] as a child.     Formatting of this note might be different from the original.  Formatting of this note might be different from the original.  Per patient report. Has seen therapist and psychiatrist in [place] as a child.     Formatting of this note might be different from the original.  Per patient report. Has seen therapist and psychiatrist     Past Surgical History:   Procedure Laterality Date    NO HISTORY OF SURGERY       Social History     Socioeconomic History    Marital status: Single     Spouse name: Not on file    Number of children: Not on file    Years of education: Not on file    Highest education level: Not on file   Occupational History    Not on file   Tobacco Use    Smoking status: Never    Smokeless tobacco: Never   Substance and Sexual Activity    Alcohol use: No    Drug use: No    Sexual activity: Yes   Other Topics Concern    Parent/sibling w/ CABG, MI or angioplasty before 65F 55M? Not Asked   Social History Narrative    Not on file     Social Drivers of Health     Financial Resource  Strain: Not on File (3/29/2022)    Received from [facility]    Financial Resource Strain     Financial Resource Strain: 0   Food Insecurity: Not on File (2024)    Received from [facility]    Food Insecurity     Food: 0   Transportation Needs: Not on File (3/29/2022)    [facility] from [facility]    Transportation Needs     Transportation: 0   Physical Activity: Not on File (3/29/2022)    Received from [facility]    Physical Activity     Physical Activity: 0   Stress: Not on File (3/29/2022)    Received from [facility]    Stress     Stress: 0   Social Connections: Not on File (2024)    Received from [facility]    Social Connections     Connectedness: 0   Interpersonal Safety: Low Risk  (2023)    Interpersonal Safety     Do you feel physically and emotionally safe where you currently live?: Yes     Within the past 12 months, have you been hit, slapped, kicked or otherwise physically hurt by someone?: No     Within the past 12 months, have you been humiliated or emotionally abused in other ways by your partner or ex-partner?: No   Housing Stability: Not on File (3/29/2022)    Received from [facility]    Housing Stability     Housin     Family History   Problem Relation Age of Onset    Lung Cancer Paternal Grandmother     Ovarian Cancer No family hx of     Breast Cancer No family hx of     Colon Cancer No family hx of     Skin Cancer No family hx of        Menstrual History:  Menses every 14 days.  Length of menses: 5 days  Menstrual description: frequent    Denies the following contraindications to estrogen/progesterone combined contraception:  Migraine with aura  Smoking over age 35  Liver disease  Personal history of blood clot or stroke   History of heart disease  History of breast cancer  Undiagnosed vaginal bleeding  Hypertension  Pregnancy    Denies the following contraindications to the IUD:  Distortion of the uterine cavity  Naveen's disease/copper allergy  Active pelvic infection  Known or  "suspected pregnancy  Breast cancer or liver disease      Health maintenance updated:  yes    ROS:   12 point review of systems negative other than symptoms noted below or in the HPI.  Genitourinary: frequent cycles    EXAM:  /78   Ht 1.676 m (5' 6\")   Wt 63 kg (139 lb)   LMP 01/31/2025   BMI 22.44 kg/m    Body mass index is 22.44 kg/m .    ASSESSMENT/PLAN:    ICD-10-CM    1. General counseling and advice on contraceptive management  Z30.09       2. Excessive and frequent menstruation  N92.0 US Pelvic Complete with Transvaginal      3. Abnormal uterine bleeding (AUB)  N93.9 US Pelvic Complete with Transvaginal        There are no contraindications to the use of NFP, non-hormonal contraception  Discussed that d/t frequent cycles, would recommend pelvic US prior to IUD placement if she desires IUD  Discussed using PO Vistaril, ibuprofen, lidocaine jelly and paracervical block prior to insertion    COUNSELING:  Reviewed risks and benefits of contraceptive use  Discussed using tracking apps and additional ovulation tracking method: BBT, OCP, cervical mucus  Discussed proper use of chosen method  Is interested in Paragard IUD as non-hormonal method is most important to her  Discussed importance of pelvic US prior to IUD placement d/t frequent menstrual cycles  Handouts/Instrucions provided: Zenaidad, Kyleena IUD  Plan for pelvic US and f/up visit to discuss IUD placement/contraceptive counseling  Return to clinic 1 month for US and visit    45 minutes spent by me on the date of the encounter doing chart review, patient visit, and documentation     Kaye HUIZAR, MARILOU, [facility]  [phone]      "

## 2025-03-04 ENCOUNTER — ANCILLARY PROCEDURE (OUTPATIENT)
Dept: ULTRASOUND IMAGING | Facility: CLINIC | Age: 25
End: 2025-03-04
Attending: NURSE PRACTITIONER
Payer: COMMERCIAL

## 2025-03-04 DIAGNOSIS — N92.0 EXCESSIVE AND FREQUENT MENSTRUATION: ICD-10-CM

## 2025-03-04 DIAGNOSIS — N93.9 ABNORMAL UTERINE BLEEDING (AUB): ICD-10-CM

## 2025-03-04 PROCEDURE — 76830 TRANSVAGINAL US NON-OB: CPT | Performed by: OBSTETRICS & GYNECOLOGY

## 2025-03-20 DIAGNOSIS — L70.0 NODULOCYSTIC ACNE: ICD-10-CM

## 2025-03-26 NOTE — TELEPHONE ENCOUNTER
spironolactone (ALDACTONE) 25 MG tablet   90 tablet 1 7/8/2024     Last Office Visit : 11-  Future Office visit:  none   Process 2    Refill decision: Medication unable to be refilled by RN due to: Overdue labs/test:      Request from pharmacy:  Requested Prescriptions   Pending Prescriptions Disp Refills    spironolactone (ALDACTONE) 25 MG tablet [Pharmacy Med Name: SPIRONOLACTONE 25MG TABLETS] 90 tablet 1     Sig: TAKE 1 TABLET DAILY AT BEDTIME WITH ONE 100MG TABLET       Diuretics (Including Combos) Protocol Failed - 3/26/2025  1:27 PM        Failed - Potassium level on file in past 12 months   Potassium   Date Value Ref Range Status   01/05/2024 4.5 3.4 - 5.3 mmol/L Final   05/21/2022 3.9 3.4 - 5.3 mmol/L Final   05/28/2019 3.5 3.4 - 5.3 mmol/L Final         Failed - Medication is active on med list and the sig matches. RN to manually verify dose and sig if red X/fail.     If the protocol passes (green check), you do not need to verify med dose and sig.    A prescription matches if they are the same clinical intention.    For Example: once daily and every morning are the same.    The protocol can not identify upper and lower case letters as matching and will fail.     For Example: Take 1 tablet (50 mg) by mouth daily     TAKE 1 TABLET (50 MG) BY MOUTH DAILY    For all fails (red x), verify dose and sig.    If the refill does match what is on file, the RN can still proceed to approve the refill request.    If they do not match, route to the appropriate provider.           Failed - Has GFR on file in past 12 months and most recent value is normal   Labs completed on :  5-  GFR Estimate  83           Passed - Most recent blood pressure under 140/90 in past 12 months     BP Readings from Last 3 Encounters:   02/04/25 108/78   11/13/24 104/70   07/08/24 111/72           Passed - Medication indicated for associated diagnosis     Medication is associated with one or more of the following diagnoses:      Hypertension   Heart Failure   Hyperaldosteronism   Acne   Hirsutism   Hypokalemia   Hepatic Cirrhosis   Nephrotic Syndrome   Myocardial Infarction   Transgender Female   Cardiomyopathy  Congenital Heart Disease  Diastolic Dysfunction          Passed - Recent (12 mo) or future (90 days) visit within the authorizing provider's specialty     The patient must have completed an in-person or virtual visit within the past 12 months or has a future visit scheduled within the next 90 days with the authorizing provider s specialty.  Urgent care and e-visits do not qualify as an office visit for this protocol.          Passed - Patient is age 18 or older        Passed - No active pregancy on record        Passed - No positive pregnancy test in past 12 months      spironolactone (ALDACTONE) 100 MG tablet     90 tablet 1 7/8/2024       spironolactone (ALDACTONE) 100 MG tablet [Pharmacy Med Name: SPIRONOLACTONE 100MG TABLETS] 90 tablet 1     Sig: TAKE 1 TABLET DAILY AT BEDTIME WITH ONE 25MG TABLET       Diuretics (Including Combos) Protocol Failed - 3/26/2025  1:27 PM        Failed - Potassium level on file in past 12 months        Failed - Medication is active on med list and the sig matches. RN to manually verify dose and sig if red X/fail.     If the protocol passes (green check), you do not need to verify med dose and sig.    A prescription matches if they are the same clinical intention.    For Example: once daily and every morning are the same.    The protocol can not identify upper and lower case letters as matching and will fail.     For Example: Take 1 tablet (50 mg) by mouth daily     TAKE 1 TABLET (50 MG) BY MOUTH DAILY    For all fails (red x), verify dose and sig.    If the refill does match what is on file, the RN can still proceed to approve the refill request.    If they do not match, route to the appropriate provider.          Failed - Has GFR on file in past 12 months and most recent value is normal         Passed - Most recent blood pressure under 140/90 in past 12 months     BP Readings from Last 3 Encounters:   02/04/25 108/78   11/13/24 104/70   07/08/24 111/72           Passed - Medication indicated for associated diagnosis     Medication is associated with one or more of the following diagnoses:     Hypertension   Heart Failure   Hyperaldosteronism   Acne   Hirsutism   Hypokalemia   Hepatic Cirrhosis   Nephrotic Syndrome   Myocardial Infarction   Transgender Female   Cardiomyopathy  Congenital Heart Disease  Diastolic Dysfunction          Passed - Recent (12 mo) or future (90 days) visit within the authorizing provider's specialty     The patient must have completed an in-person or virtual visit within the past 12 months or has a future visit scheduled within the next 90 days with the authorizing provider s specialty.  Urgent care and e-visits do not qualify as an office visit for this protocol.          Passed - Patient is age 18 or older        Passed - No active pregancy on record        Passed - No positive pregnancy test in past 12 months

## 2025-03-27 ENCOUNTER — TELEPHONE (OUTPATIENT)
Dept: DERMATOLOGY | Facility: CLINIC | Age: 25
End: 2025-03-27
Payer: COMMERCIAL

## 2025-03-27 RX ORDER — SPIRONOLACTONE 100 MG/1
TABLET, FILM COATED ORAL
Qty: 90 TABLET | Refills: 1 | Status: SHIPPED | OUTPATIENT
Start: 2025-03-27

## 2025-03-27 RX ORDER — SPIRONOLACTONE 25 MG/1
TABLET ORAL
Qty: 90 TABLET | Refills: 1 | Status: SHIPPED | OUTPATIENT
Start: 2025-03-27

## 2025-03-27 NOTE — TELEPHONE ENCOUNTER
3/27 Left Voicemail (1st Attempt) and Sent Mychart (1st Attempt) for the patient to call back and schedule the following:    Appointment type: Return Dermatology  Provider: Jean Marie  Return date: 11/12/25  Specialty phone number: [phone]  Additional appointment(s) needed: na  Additonal Notes: Annual follow up - in basket

## 2025-04-01 ENCOUNTER — TELEPHONE (OUTPATIENT)
Dept: DERMATOLOGY | Facility: CLINIC | Age: 25
End: 2025-04-01
Payer: COMMERCIAL

## 2025-04-08 ENCOUNTER — OFFICE VISIT (OUTPATIENT)
Dept: FAMILY MEDICINE | Facility: CLINIC | Age: 25
End: 2025-04-08
Attending: FAMILY MEDICINE
Payer: COMMERCIAL

## 2025-04-08 ENCOUNTER — LAB (OUTPATIENT)
Dept: LAB | Facility: CLINIC | Age: 25
End: 2025-04-08
Attending: FAMILY MEDICINE
Payer: COMMERCIAL

## 2025-04-08 VITALS
HEART RATE: 98 BPM | DIASTOLIC BLOOD PRESSURE: 69 MMHG | BODY MASS INDEX: 22.92 KG/M2 | SYSTOLIC BLOOD PRESSURE: 111 MMHG | HEIGHT: 66 IN | WEIGHT: 142.6 LBS

## 2025-04-08 DIAGNOSIS — Z11.3 ROUTINE SCREENING FOR STI (SEXUALLY TRANSMITTED INFECTION): Primary | ICD-10-CM

## 2025-04-08 DIAGNOSIS — Z12.4 CERVICAL CANCER SCREENING: ICD-10-CM

## 2025-04-08 DIAGNOSIS — E28.2 PCOS (POLYCYSTIC OVARIAN SYNDROME): ICD-10-CM

## 2025-04-08 DIAGNOSIS — N92.6 IRREGULAR MENSTRUAL CYCLE: Chronic | ICD-10-CM

## 2025-04-08 DIAGNOSIS — Z30.09 ENCOUNTER FOR GENERAL COUNSELING AND ADVICE ON CONTRACEPTIVE MANAGEMENT: ICD-10-CM

## 2025-04-08 LAB
ERYTHROCYTE [DISTWIDTH] IN BLOOD BY AUTOMATED COUNT: 11.9 % (ref 10–15)
FERRITIN SERPL-MCNC: 31 NG/ML (ref 6–175)
HCT VFR BLD AUTO: 38.6 % (ref 35–47)
HGB BLD-MCNC: 12.7 G/DL (ref 11.7–15.7)
MCH RBC QN AUTO: 31.5 PG (ref 26.5–33)
MCHC RBC AUTO-ENTMCNC: 32.9 G/DL (ref 31.5–36.5)
MCV RBC AUTO: 96 FL (ref 78–100)
PLATELET # BLD AUTO: 299 10E3/UL (ref 150–450)
PROLACTIN SERPL 3RD IS-MCNC: 16 NG/ML (ref 5–23)
RBC # BLD AUTO: 4.03 10E6/UL (ref 3.8–5.2)
TSH SERPL DL<=0.005 MIU/L-ACNC: 1.62 UIU/ML (ref 0.3–4.2)
WBC # BLD AUTO: 6 10E3/UL (ref 4–11)

## 2025-04-08 PROCEDURE — 84443 ASSAY THYROID STIM HORMONE: CPT

## 2025-04-08 PROCEDURE — 87491 CHLMYD TRACH DNA AMP PROBE: CPT | Performed by: FAMILY MEDICINE

## 2025-04-08 PROCEDURE — 36415 COLL VENOUS BLD VENIPUNCTURE: CPT

## 2025-04-08 PROCEDURE — 87591 N.GONORRHOEAE DNA AMP PROB: CPT | Performed by: FAMILY MEDICINE

## 2025-04-08 PROCEDURE — 85027 COMPLETE CBC AUTOMATED: CPT

## 2025-04-08 PROCEDURE — 82728 ASSAY OF FERRITIN: CPT

## 2025-04-08 PROCEDURE — G0463 HOSPITAL OUTPT CLINIC VISIT: HCPCS | Performed by: FAMILY MEDICINE

## 2025-04-08 PROCEDURE — 84146 ASSAY OF PROLACTIN: CPT

## 2025-04-08 NOTE — PATIENT INSTRUCTIONS
Thank you for trusting us with your care!   Please be aware, if you are on Mychart, you may see your results prior to your providers review. If labs are abnormal, we will call or message you on [employer] with a follow up plan.    If you need to contact us for questions about:  Symptoms, Scheduling & Medical Questions; Non-urgent (2-3 day response) [employer] message, Urgent (needing response today) [phone] (if after 3:30pm next day response)   Prescriptions: Please call your Pharmacy   Billing: [facility] [phone] or [facility]:[phone]

## 2025-04-08 NOTE — PROGRESS NOTES
Assessment & Plan     Cervical cancer screening  First pap smear today.   - Pap Screen Reflex to HPV if ASCUS - Recommended Age 25 - 29 Years    Routine screening for STI (sexually transmitted infection)  Routine screening collected.   - NEISSERIA GONORRHOEA PCR  - CHLAMYDIA TRACHOMATIS PCR    Irregular menstrual cycle  Check labs today. Pelvic US unremarkable.   - CBC with Platelets; Future  - Ferritin; Future  - TSH - Reflex to FT4; Future  - Prolactin; Future  - drospirenone (SLYND) 4 MG TABS tablet; Take 1 tablet (4 mg) by mouth daily.    Encounter for general counseling and advice on contraceptive management  PCOS (polycystic ovarian syndrome)  Discussed recommendation for progesterone only or non hormonal method due to history of migraine with aura. She does not like the idea of something in her body (IUD/Nexplanon) and had worse acne from Micronor but is open to trying Slynd. Dosing and side effects reviewed.    - drospirenone (SLYND) 4 MG TABS tablet; Take 1 tablet (4 mg) by mouth daily.          Cyn Wade is a 24 year old, presenting for the following health issues:  Consult (Discuss birth control)    JONATHAN Wade is a  who presents to discuss contraception.   Had a consult on 2025 with midwife discussing IUD. Pelvic US ordered d/t reporting menstrual cycles every 14 days. Pelvic US was unremarkable.    Method interested in: combined patch - doesn't like idea of IUD in her body         Methods used previously: minipill - didn't like it because of acne, combined OCP - used as a teenager but can't remmber how she felt about it        Patient's last menstrual period was 2025 (exact date).   Menstrual cycle: irregular - every 2-4 weeks  Flow: medium  History of migraines: YES with visual rehman prior to headache, but none in a few years  Smoker: No  1st degree relative with History of: blood clots in her sister who has factor deficiency   Sexually active with one partner   Has  "never had a pap smear  No history of STI  Prior diagnosis of PCOS - over the last 6 months has had very frequent menses (every 2 weeks), last for 4-5 days, moderate flow   History of migraine with aura  Sister with a history of blood clots      Objective    Ht 1.676 m (5' 6\")   Wt 64.7 kg (142 lb 9.6 oz)   LMP 04/03/2025 (Exact Date)   BMI 23.02 kg/m      Physical Exam   GENERAL: alert and no distress   (female) w/bimanual: normal female external genitalia, normal urethral meatus, normal vaginal mucosa, and normal cervix/adnexa/uterus without masses or discharge    US pelvic 3/4/2025:   Normal pelvic ultrasound. Trace free fluid.         Signed Electronically by: Inna Fairbanks MD    "

## 2025-04-09 LAB
C TRACH DNA SPEC QL NAA+PROBE: NEGATIVE
N GONORRHOEA DNA SPEC QL NAA+PROBE: NEGATIVE
SPECIMEN TYPE: NORMAL
SPECIMEN TYPE: NORMAL

## 2025-05-02 ENCOUNTER — MYC MEDICAL ADVICE (OUTPATIENT)
Dept: FAMILY MEDICINE | Facility: CLINIC | Age: 25
End: 2025-05-02
Payer: COMMERCIAL

## 2025-05-08 ENCOUNTER — TELEPHONE (OUTPATIENT)
Dept: OBGYN | Facility: CLINIC | Age: 25
End: 2025-05-08
Payer: COMMERCIAL

## 2025-05-08 NOTE — TELEPHONE ENCOUNTER
Prior Authorization Retail Medication Request    Medication/Dose: slynd  Diagnosis and ICD code (if different than what is on RX):    New/renewal/insurance change PA/secondary ins. PA:  Previously Tried and Failed:    Rationale:      Insurance   Primary:   Insurance ID:      Secondary (if applicable):  Insurance ID:      Pharmacy Information (if different than what is on RX)  Name:    Phone:    Fax:    Clinic Information  Preferred routing pool for dept communication: [facility]

## 2025-06-04 ENCOUNTER — E-VISIT (OUTPATIENT)
Dept: URGENT CARE | Facility: CLINIC | Age: 25
End: 2025-06-04
Payer: COMMERCIAL

## 2025-06-04 DIAGNOSIS — R30.0 DIFFICULT OR PAINFUL URINATION: Primary | ICD-10-CM

## 2025-06-04 PROCEDURE — 99207 PR NON-BILLABLE SERV PER CHARTING: CPT | Performed by: NURSE PRACTITIONER

## 2025-06-04 NOTE — PATIENT INSTRUCTIONS
Dear Kolby Arriaga,    We are sorry you are not feeling well. Based on the responses you provided, it is recommended that you be seen in-person in urgent care so we can better evaluate your symptoms. Please click here to find the nearest urgent care location to you.   You will not be charged for this Visit. Thank you for trusting us with your care.    Lala Manning, CNP    We cannot prescribe in [place] unfortunately.

## 2025-06-21 ENCOUNTER — MYC MEDICAL ADVICE (OUTPATIENT)
Dept: FAMILY MEDICINE | Facility: CLINIC | Age: 25
End: 2025-06-21
Payer: COMMERCIAL